# Patient Record
Sex: FEMALE | Race: WHITE | Employment: FULL TIME | URBAN - METROPOLITAN AREA
[De-identification: names, ages, dates, MRNs, and addresses within clinical notes are randomized per-mention and may not be internally consistent; named-entity substitution may affect disease eponyms.]

---

## 2017-01-03 ENCOUNTER — OFFICE VISIT (OUTPATIENT)
Dept: FAMILY MEDICINE | Facility: CLINIC | Age: 27
End: 2017-01-03
Payer: COMMERCIAL

## 2017-01-03 VITALS
SYSTOLIC BLOOD PRESSURE: 119 MMHG | TEMPERATURE: 97.7 F | BODY MASS INDEX: 25.33 KG/M2 | WEIGHT: 152 LBS | HEIGHT: 65 IN | HEART RATE: 77 BPM | DIASTOLIC BLOOD PRESSURE: 68 MMHG

## 2017-01-03 DIAGNOSIS — Z77.120 MOLD SUSPECTED EXPOSURE: ICD-10-CM

## 2017-01-03 DIAGNOSIS — J30.9 ATOPIC RHINITIS: Primary | ICD-10-CM

## 2017-01-03 PROCEDURE — 99213 OFFICE O/P EST LOW 20 MIN: CPT | Performed by: FAMILY MEDICINE

## 2017-01-03 RX ORDER — FLUTICASONE PROPIONATE 50 MCG
1-2 SPRAY, SUSPENSION (ML) NASAL DAILY
Qty: 16 G | Refills: 1 | Status: SHIPPED | OUTPATIENT
Start: 2017-01-03 | End: 2018-03-22

## 2017-01-03 NOTE — NURSING NOTE
"Chief Complaint   Patient presents with     Cough     Pt has had a dry cough and some sinus drainage for the past 3 wks.        Initial /68 mmHg  Pulse 77  Temp(Src) 97.7  F (36.5  C) (Tympanic)  Ht 5' 5.25\" (1.657 m)  Wt 152 lb (68.947 kg)  BMI 25.11 kg/m2 Estimated body mass index is 25.11 kg/(m^2) as calculated from the following:    Height as of this encounter: 5' 5.25\" (1.657 m).    Weight as of this encounter: 152 lb (68.947 kg).  BP completed using cuff size: emelia Simon CMA    "

## 2017-01-03 NOTE — MR AVS SNAPSHOT
After Visit Summary   1/3/2017    Indigo Courtney    MRN: [id number]           Patient Information     Date Of Birth          1990        Visit Information        Provider Department      1/3/2017 9:40 AM Jaren Gtz MD [facility]        Today's Diagnoses     Atopic rhinitis    -  1     Mold suspected exposure           Care Instructions      Allergic Rhinitis  Allergic rhinitis is an allergic reaction that affects the nose, and often the eyes. It s often known as nasal allergies. Nasal allergies are often due to things in the environment that are breathed in. Depending what you are sensitive to, nasal allergies may occur only during certain seasons. Or they may occur year round. Common indoor allergens include house dust mites, mold, cockroaches, and pet dander. Outdoor allergens include pollen from trees, grasses, and weeds.   Symptoms include a drippy, stuffy, and itchy nose. They also include sneezing and red and itchy eyes. You may feel tired more often. Severe allergies may also affect your breathing and trigger a condition called asthma.   Tests can be done to see what allergens are affecting you. You may be referred to an allergy specialist for testing and further evaluation.  Home care  The healthcare provider may prescribe medicines to help relieve allergy symptoms.   Ask the provider for advice on how to avoid substances that you are allergic to. Below are a few tips for each type of allergen.  Pet dander:    Do not have pets with fur and feathers.    If you cannot avoid having a pet, keep it out of your bedroom and off upholstered furniture.  Pollen:    When pollen counts are high, keep windows of your car and home closed. If possible, use an air conditioner instead.    Wear a filter mask when mowing or doing yard work.  House dust mites:    Wash bedding every week in warm water and detergent and dry on a hot setting.    Cover the mattress, box spring, and  pillows with allergy covers.     If possible, sleep in a room with no carpet, curtains, or upholstered furniture.  Cockroaches:    Store food in sealed containers.    Remove garbage from the home promptly.    Fix water leaks  Mold:    Keep humidity low by using a dehumidifier or air conditioner. Keep the dehumidifier and air conditioner clean and free of mold.    Clean moldy areas with bleach and water.  In general:    Vacuum once or twice a week. If possible, use a vacuum with a high-efficiency particulate air (HEPA) filter.    Do not smoke. Avoid cigarette smoke. Cigarette smoke is an irritant that can make symptoms worse.  Follow-up care  Follow up as advised by the health care provider or our staff. If you were referred to an allergy specialist, make this appointment promptly.  When to seek medical advice  Call your healthcare provider right away if the following occur:    Coughing or wheezing    Fever greater than 100.4 F (38 C)    Continuing symptoms, new symptoms, or worsening symptoms  Call 911 right away if you have:    Trouble breathing    Hives (raised red bumps)    Severe swelling of the face or severe itching of the eyes or mouth    7947-0560 [employer]. [address] [place] [address]. All rights reserved. This information is not intended as a substitute for professional medical care. Always follow your healthcare professional's instructions.              Follow-ups after your visit        Who to contact     If you have questions or need follow up information about today's clinic visit or your schedule please contact [facility] directly at [phone].  Normal or non-critical lab and imaging results will be communicated to you by MyChart, letter or phone within 4 business days after the clinic has received the results. If you do not hear from us within 7 days, please contact the clinic through MyChart or phone. If you have a critical or abnormal lab result, we  "will notify you by phone as soon as possible.  Submit refill requests through "[employer] or call your pharmacy and they will forward the refill request to us. Please allow 3 business days for your refill to be completed.          Additional Information About Your Visit        uiuhart Information     "[employer] gives you secure access to your electronic health record. If you see a primary care provider, you can also send messages to your care team and make appointments. If you have questions, please call your primary care clinic.  If you do not have a primary care provider, please call [phone] and they will assist you.        Care EveryWhere ID     This is your Care EveryWhere ID. This could be used by other organizations to access your [facility] medical records  [id number]        Your Vitals Were     Pulse Temperature Height BMI (Body Mass Index)          77 97.7  F (36.5  C) (Tympanic) 5' 5.25\" (1.657 m) 25.11 kg/m2         Blood Pressure from Last 3 Encounters:   01/03/17 119/68   06/30/15 102/61   06/02/14 113/77    Weight from Last 3 Encounters:   01/03/17 152 lb (68.947 kg)   06/30/15 139 lb (63.05 kg)   06/02/14 135 lb 14.4 oz (61.644 kg)              Today, you had the following     No orders found for display         Today's Medication Changes          These changes are accurate as of: 1/3/17 10:24 AM.  If you have any questions, ask your nurse or doctor.               Start taking these medicines.        Dose/Directions    fluticasone 50 MCG/ACT spray   Commonly known as:  FLONASE   Used for:  Atopic rhinitis   Started by:  Jaren Gtz MD        Dose:  1-2 spray   Spray 1-2 sprays into both nostrils daily   Quantity:  16 g   Refills:  1            Where to get your medicines      These medications were sent to [facility] [place], [place] - [address]  [address] [address]     Phone:  [phone]    - fluticasone 50 MCG/ACT spray             Primary Care " Provider Office Phone # Fax #    Nicole Angelina Alvarez [phone]       [facility] [address] [facility] [address]        Thank you!     Thank you for choosing [facility]  for your care. Our goal is always to provide you with excellent care. Hearing back from our patients is one way we can continue to improve our services. Please take a few minutes to complete the written survey that you may receive in the mail after your visit with us. Thank you!             Your Updated Medication List - Protect others around you: Learn how to safely use, store and throw away your medicines at www.disposemymeds.org.          This list is accurate as of: 1/3/17 10:24 AM.  Always use your most recent med list.                   Brand Name Dispense Instructions for use    fluticasone 50 MCG/ACT spray    FLONASE    16 g    Spray 1-2 sprays into both nostrils daily       norgestimate-ethinyl estradiol 0.25-35 MG-MCG per tablet    ORTHO-CYCLEN, SPRINTEC    84 tablet    Take 1 tablet by mouth daily Patient to skip placebo week

## 2017-01-03 NOTE — PATIENT INSTRUCTIONS
Allergic Rhinitis  Allergic rhinitis is an allergic reaction that affects the nose, and often the eyes. It s often known as nasal allergies. Nasal allergies are often due to things in the environment that are breathed in. Depending what you are sensitive to, nasal allergies may occur only during certain seasons. Or they may occur year round. Common indoor allergens include house dust mites, mold, cockroaches, and pet dander. Outdoor allergens include pollen from trees, grasses, and weeds.   Symptoms include a drippy, stuffy, and itchy nose. They also include sneezing and red and itchy eyes. You may feel tired more often. Severe allergies may also affect your breathing and trigger a condition called asthma.   Tests can be done to see what allergens are affecting you. You may be referred to an allergy specialist for testing and further evaluation.  Home care  The healthcare provider may prescribe medicines to help relieve allergy symptoms.   Ask the provider for advice on how to avoid substances that you are allergic to. Below are a few tips for each type of allergen.  Pet dander:    Do not have pets with fur and feathers.    If you cannot avoid having a pet, keep it out of your bedroom and off upholstered furniture.  Pollen:    When pollen counts are high, keep windows of your car and home closed. If possible, use an air conditioner instead.    Wear a filter mask when mowing or doing yard work.  House dust mites:    Wash bedding every week in warm water and detergent and dry on a hot setting.    Cover the mattress, box spring, and pillows with allergy covers.     If possible, sleep in a room with no carpet, curtains, or upholstered furniture.  Cockroaches:    Store food in sealed containers.    Remove garbage from the home promptly.    Fix water leaks  Mold:    Keep humidity low by using a dehumidifier or air conditioner. Keep the dehumidifier and air conditioner clean and free of mold.    Clean moldy areas with  bleach and water.  In general:    Vacuum once or twice a week. If possible, use a vacuum with a high-efficiency particulate air (HEPA) filter.    Do not smoke. Avoid cigarette smoke. Cigarette smoke is an irritant that can make symptoms worse.  Follow-up care  Follow up as advised by the health care provider or our staff. If you were referred to an allergy specialist, make this appointment promptly.  When to seek medical advice  Call your healthcare provider right away if the following occur:    Coughing or wheezing    Fever greater than 100.4 F (38 C)    Continuing symptoms, new symptoms, or worsening symptoms  Call 911 right away if you have:    Trouble breathing    Hives (raised red bumps)    Severe swelling of the face or severe itching of the eyes or mouth    5140-6009 [employer]. [address] [place] [address], [place] [address]. All rights reserved. This information is not intended as a substitute for professional medical care. Always follow your healthcare professional's instructions.

## 2017-01-03 NOTE — PROGRESS NOTES
SUBJECTIVE:                                                    Indigo Courtney is a 26 year old female who presents to clinic today for the following health issues:  Chief Complaint   Patient presents with     Cough     Pt has had a dry cough and some sinus drainage for the past 3 wks.          ENT Symptoms             Symptoms: cc Present Absent Comment   Fever/Chills   x    Fatigue   x    Muscle Aches  x  occasional   Eye Irritation   x    Sneezing   x    Nasal Jacoby/Drg x   Drainage down back of throat    Sinus Pressure/Pain   x    Loss of smell   x    Dental pain   x    Sore Throat   x    Swollen Glands x      Ear Pain/Fullness  x  Both-fullness last 2 days   Cough x   dry   Wheeze   x    Chest Pain   x    Shortness of breath   x    Rash   x    Other         Symptom duration:   3 wks   Symptom severity:  moderate   Treatments tried:  none   Contacts:  none, did find mold in corner of closet yesterday     Verified above history with patient.  Patient has no documented hx of asthma, RAD or chronic lung disease.    Patient states he continues to have the postnasal drip.    Problem list and histories reviewed & adjusted, as indicated.  Additional history: as documented    Patient Active Problem List   Diagnosis     CARDIOVASCULAR SCREENING; LDL GOAL LESS THAN 160     Dysmenorrhea     Recurrent UTI     Family history of nephrolithiasis     Past Surgical History   Procedure Laterality Date     Hc tooth extraction w/forcep       wisdom teeth     Surgical history of -   2008     Bone-patellar-bone left knee anterior cruciate ligament reconstruction       Social History   Substance Use Topics     Smoking status: Never Smoker      Smokeless tobacco: Never Used      Comment: parents smoke     Alcohol Use: Yes      Comment: rare     Family History   Problem Relation Age of Onset     C.A.D. No family hx of      DIABETES No family hx of      Hypertension No family hx of      CEREBROVASCULAR DISEASE No family hx of       "Breast Cancer No family hx of      Cancer - colorectal No family hx of      Allergies Brother          Current Outpatient Prescriptions   Medication Sig Dispense Refill     fluticasone (FLONASE) 50 MCG/ACT spray Spray 1-2 sprays into both nostrils daily 16 g 1     norgestimate-ethinyl estradiol (ORTHO-CYCLEN, SPRINTEC) 0.25-35 MG-MCG per tablet Take 1 tablet by mouth daily Patient to skip placebo week 84 tablet 3     Allergies   Allergen Reactions     Penicillins Rash     Zithromax [Azithromycin Dihydrate] Rash       ROS:  C: NEGATIVE for fever, chills, change in weight  I: NEGATIVE for worrisome rashes, moles or lesions  E: NEGATIVE for vision changes or irritation  ENT/MOUTH: see above  RESP:as above  CV: NEGATIVE for chest pain, palpitations or peripheral edema  GI: NEGATIVE for nausea, abdominal pain, heartburn, or change in bowel habits  M: NEGATIVE for significant arthralgias or myalgia   OBJECTIVE:                                                    /68 mmHg  Pulse 77  Temp(Src) 97.7  F (36.5  C) (Tympanic)  Ht 5' 5.25\" (1.657 m)  Wt 152 lb (68.947 kg)  BMI 25.11 kg/m2  Body mass index is 25.11 kg/(m^2).  GENERAL:  alert and no distress  EYES: pink conjunctivae, no icterus  NECK: mild cervical adenopathy, nontender  HEENT: tympanic membrane intact and pearly bilaterally, nose with mild congestion and sligthly swollen pale turbinates, no sinus tenderness, throat not erythematous, small amount of clear postnasal drip, no oral ulcers  RESP: lungs clear to auscultation - no rales, no rhonchi, no wheezes  CV: regular rates and rhythm, normal S1 S2, no S3 or S4 and no murmur  SKIN:  Good turgor, no rashes    Diagnostic test results:  Diagnostic Test Results:  No results found for this or any previous visit (from the past 24 hour(s)).     ASSESSMENT/PLAN:                                                        ICD-10-CM    1. Atopic rhinitis J30.9 fluticasone (FLONASE) 50 MCG/ACT spray  Discussed course and " treatment of allergic rhinitis.  Advised to avoid known triggers.  Saline nasal irrigation as tolerated.  Return precautions discussed and given to patient.     2. Mold suspected exposure Z77.120 Advised patient to have dwelling fully inspected for mold as visual evidence of mold on one area, may indicate other areas to be infested as well.       Follow up with Provider - prn   Patient Instructions     Allergic Rhinitis  Allergic rhinitis is an allergic reaction that affects the nose, and often the eyes. It s often known as nasal allergies. Nasal allergies are often due to things in the environment that are breathed in. Depending what you are sensitive to, nasal allergies may occur only during certain seasons. Or they may occur year round. Common indoor allergens include house dust mites, mold, cockroaches, and pet dander. Outdoor allergens include pollen from trees, grasses, and weeds.   Symptoms include a drippy, stuffy, and itchy nose. They also include sneezing and red and itchy eyes. You may feel tired more often. Severe allergies may also affect your breathing and trigger a condition called asthma.   Tests can be done to see what allergens are affecting you. You may be referred to an allergy specialist for testing and further evaluation.  Home care  The healthcare provider may prescribe medicines to help relieve allergy symptoms.   Ask the provider for advice on how to avoid substances that you are allergic to. Below are a few tips for each type of allergen.  Pet dander:    Do not have pets with fur and feathers.    If you cannot avoid having a pet, keep it out of your bedroom and off upholstered furniture.  Pollen:    When pollen counts are high, keep windows of your car and home closed. If possible, use an air conditioner instead.    Wear a filter mask when mowing or doing yard work.  House dust mites:    Wash bedding every week in warm water and detergent and dry on a hot setting.    Cover the mattress, box  spring, and pillows with allergy covers.     If possible, sleep in a room with no carpet, curtains, or upholstered furniture.  Cockroaches:    Store food in sealed containers.    Remove garbage from the home promptly.    Fix water leaks  Mold:    Keep humidity low by using a dehumidifier or air conditioner. Keep the dehumidifier and air conditioner clean and free of mold.    Clean moldy areas with bleach and water.  In general:    Vacuum once or twice a week. If possible, use a vacuum with a high-efficiency particulate air (HEPA) filter.    Do not smoke. Avoid cigarette smoke. Cigarette smoke is an irritant that can make symptoms worse.  Follow-up care  Follow up as advised by the health care provider or our staff. If you were referred to an allergy specialist, make this appointment promptly.  When to seek medical advice  Call your healthcare provider right away if the following occur:    Coughing or wheezing    Fever greater than 100.4 F (38 C)    Continuing symptoms, new symptoms, or worsening symptoms  Call 911 right away if you have:    Trouble breathing    Hives (raised red bumps)    Severe swelling of the face or severe itching of the eyes or mouth    4110-1828 [employer]. [address] [place] [address], [place] [address]. All rights reserved. This information is not intended as a substitute for professional medical care. Always follow your healthcare professional's instructions.              Jaren Gtz MD  [facility]

## 2017-07-15 ENCOUNTER — HEALTH MAINTENANCE LETTER (OUTPATIENT)
Age: 27
End: 2017-07-15

## 2017-08-01 ENCOUNTER — TELEPHONE (OUTPATIENT)
Dept: FAMILY MEDICINE | Facility: CLINIC | Age: 27
End: 2017-08-01

## 2017-08-01 NOTE — LETTER
August 1, 2017      Indigo Courtney  [address] [facility] [address]          Dr. Gtz's Care Team has been reviewing your chart and it appears that there are aspects of your care that could be improved. At this time you are due for a pap smear.  If you could plan to do that in the near future it would be very helpful.  We are trying to help our patients achieve their health care goals.    If you have any questions, please feel free to call the clinic at [phone].              Sincerely,        Jaren Gtz MD

## 2017-08-01 NOTE — TELEPHONE ENCOUNTER
Panel Management Review          Composite cancer screening  Chart review shows that this patient is due/due soon for the following Pap Smear  Summary:    Patient is due/failing the following:   PAP    Action needed:   Patient needs office visit for pap smear.    Type of outreach:    Sent letter.    Questions for provider review:    None                                                                                                                                    Devi Simon CMA

## 2017-12-04 DIAGNOSIS — N94.6 DYSMENORRHEA: ICD-10-CM

## 2017-12-04 RX ORDER — NORGESTIMATE AND ETHINYL ESTRADIOL 0.25-0.035
1 KIT ORAL DAILY
Qty: 84 TABLET | Refills: 0 | Status: SHIPPED | OUTPATIENT
Start: 2017-12-04 | End: 2018-03-22

## 2017-12-04 NOTE — TELEPHONE ENCOUNTER
Called pt left message to return call.  Pt is due for gyn exam.  Her last pap was done on 12-27-13.  Terri refill given.    Bianca Pritchard  [facility] RN

## 2017-12-04 NOTE — TELEPHONE ENCOUNTER
Pt notified of below.  Pt reports understanding.  Pt does not have further questions or concerns.  Patient placed on call list for when schedule opens in February.    Antonette Sabillon   [facility]  RN

## 2018-03-22 ENCOUNTER — TELEPHONE (OUTPATIENT)
Dept: OBGYN | Facility: CLINIC | Age: 28
End: 2018-03-22

## 2018-03-22 ENCOUNTER — OFFICE VISIT (OUTPATIENT)
Dept: OBGYN | Facility: CLINIC | Age: 28
End: 2018-03-22
Payer: COMMERCIAL

## 2018-03-22 VITALS
HEART RATE: 68 BPM | DIASTOLIC BLOOD PRESSURE: 64 MMHG | HEIGHT: 65 IN | SYSTOLIC BLOOD PRESSURE: 118 MMHG | RESPIRATION RATE: 12 BRPM | WEIGHT: 155 LBS | BODY MASS INDEX: 25.83 KG/M2

## 2018-03-22 DIAGNOSIS — Z01.419 ENCOUNTER FOR GYNECOLOGICAL EXAMINATION WITHOUT ABNORMAL FINDING: Primary | ICD-10-CM

## 2018-03-22 DIAGNOSIS — N94.6 DYSMENORRHEA: ICD-10-CM

## 2018-03-22 PROCEDURE — G0145 SCR C/V CYTO,THINLAYER,RESCR: HCPCS | Performed by: OBSTETRICS & GYNECOLOGY

## 2018-03-22 PROCEDURE — 99395 PREV VISIT EST AGE 18-39: CPT | Performed by: OBSTETRICS & GYNECOLOGY

## 2018-03-22 RX ORDER — ETONOGESTREL AND ETHINYL ESTRADIOL VAGINAL RING .015; .12 MG/D; MG/D
RING VAGINAL
Qty: 3 EACH | Refills: 0 | Status: SHIPPED | OUTPATIENT
Start: 2018-03-22 | End: 2018-04-02

## 2018-03-22 RX ORDER — NORGESTIMATE AND ETHINYL ESTRADIOL 0.25-0.035
1 KIT ORAL DAILY
Qty: 84 TABLET | Refills: 3 | Status: SHIPPED | OUTPATIENT
Start: 2018-03-22 | End: 2018-04-02

## 2018-03-22 NOTE — NURSING NOTE
"Chief Complaint   Patient presents with     Physical     change birth control       Initial /64 (BP Location: Right arm, Patient Position: Chair, Cuff Size: Adult Regular)  Pulse 68  Resp 12  Ht 5' 5.25\" (1.657 m)  Wt 155 lb (70.3 kg)  LMP 03/20/2018  BMI 25.6 kg/m2 Estimated body mass index is 25.6 kg/(m^2) as calculated from the following:    Height as of this encounter: 5' 5.25\" (1.657 m).    Weight as of this encounter: 155 lb (70.3 kg).  Medication Reconciliation: complete     Amy Smith MA      "

## 2018-03-22 NOTE — MR AVS SNAPSHOT
After Visit Summary   3/22/2018    Indigo Courtney    MRN: [id number]           Patient Information     Date Of Birth          1990        Visit Information        Provider Department      3/22/2018 1:30 PM Evelin Fonseca MD [facility]        Today's Diagnoses     Encounter for gynecological examination without abnormal finding    -  1    Dysmenorrhea          Care Instructions      Preventive Health Recommendations  Female Ages 18 to 25     Yearly exam:     See your health care provider every year in order to  o Review health changes.   o Discuss preventive care.    o Review your medicines if your doctor has prescribed any.      You should be tested each year for STDs (sexually transmitted diseases).       After age 20, talk to your provider about how often you should have cholesterol testing.      Starting at age 21, get a Pap test every three years. If you have an abnormal result, your doctor may have you test more often.      If you are at risk for diabetes, you should have a diabetes test (fasting glucose).     Shots:     Get a flu shot each year.     Get a tetanus shot every 10 years.     Consider getting the shot (vaccine) that prevents cervical cancer (Gardasil).    Nutrition:     Eat at least 5 servings of fruits and vegetables each day.    Eat whole-grain bread, whole-wheat pasta and brown rice instead of white grains and rice.    Talk to your provider about Calcium and Vitamin D.     Lifestyle    Exercise at least 150 minutes a week each week (30 minutes a day, 5 days a week). This will help you control your weight and prevent disease.    Limit alcohol to one drink per day.    No smoking.     Wear sunscreen to prevent skin cancer.    See your dentist every six months for an exam and cleaning.          Follow-ups after your visit        Who to contact     If you have questions or need follow up information about today's clinic visit or your schedule please contact  "[facility] directly at [phone].  Normal or non-critical lab and imaging results will be communicated to you by MyChart, letter or phone within 4 business days after the clinic has received the results. If you do not hear from us within 7 days, please contact the clinic through [employer] or phone. If you have a critical or abnormal lab result, we will notify you by phone as soon as possible.  Submit refill requests through [employer] or call your pharmacy and they will forward the refill request to us. Please allow 3 business days for your refill to be completed.          Additional Information About Your Visit        [employer] Information     [employer] gives you secure access to your electronic health record. If you see a primary care provider, you can also send messages to your care team and make appointments. If you have questions, please call your primary care clinic.  If you do not have a primary care provider, please call [phone] and they will assist you.        Care EveryWhere ID     This is your Care EveryWhere ID. This could be used by other organizations to access your [place] medical records  [id number]        Your Vitals Were     Pulse Respirations Height Last Period BMI (Body Mass Index)       68 12 5' 5.25\" (1.657 m) 03/20/2018 25.6 kg/m2        Blood Pressure from Last 3 Encounters:   03/22/18 118/64   01/03/17 119/68   06/30/15 102/61    Weight from Last 3 Encounters:   03/22/18 155 lb (70.3 kg)   01/03/17 152 lb (68.9 kg)   06/30/15 139 lb (63 kg)              We Performed the Following     Pap imaged thin layer screen reflex to HPV if ASCUS - recommend age 25 - 29          Today's Medication Changes          These changes are accurate as of 3/22/18  3:13 PM.  If you have any questions, ask your nurse or doctor.               Start taking these medicines.        Dose/Directions    etonogestrel-ethinyl estradiol 0.12-0.015 MG/24HR vaginal ring   Commonly known as:  NUVARING   Used " for:  Dysmenorrhea   Started by:  Evelin Fonseca MD        Insert 1 ring vaginally every 21 days   Quantity:  3 each   Refills:  0         Stop taking these medicines if you haven't already. Please contact your care team if you have questions.     fluticasone 50 MCG/ACT spray   Commonly known as:  FLONASE   Stopped by:  Evelin Fonseca MD                Where to get your medicines      These medications were sent to [facility] [address]  [address] [address]     Phone:  [phone]     etonogestrel-ethinyl estradiol 0.12-0.015 MG/24HR vaginal ring    norgestimate-ethinyl estradiol 0.25-35 MG-MCG per tablet                Primary Care Provider Office Phone # Fax #    Nicole Angelina Alvarez [phone] [address] [facility] [address]        Equal Access to Services     DUSTIN [facility] AH: Hadii dianna khan hadasho Soomaali, waaxda luqadaha, qaybta kaalmada adeegyada, waxay carlyin haysarahn tatiana armstrong . So [facility] [phone].    ATENCIÓN: Si marleen espdaniel, tiene a santiago disposición servicios gratuitos de asistencia lingüística. Llame al [phone].    We comply with applicable federal civil rights laws and [place] laws. We do not discriminate on the basis of race, color, national origin, age, disability, sex, sexual orientation, or gender identity.            Thank you!     Thank you for choosing [facility]  for your care. Our goal is always to provide you with excellent care. Hearing back from our patients is one way we can continue to improve our services. Please take a few minutes to complete the written survey that you may receive in the mail after your visit with us. Thank you!             Your Updated Medication List - Protect others around you: Learn how to safely use, store and throw away your medicines at www.disposemymeds.org.          This list is accurate as of 3/22/18  3:13 PM.  Always use your most recent med  list.                   Brand Name Dispense Instructions for use Diagnosis    etonogestrel-ethinyl estradiol 0.12-0.015 MG/24HR vaginal ring    NUVARING    3 each    Insert 1 ring vaginally every 21 days    Dysmenorrhea       norgestimate-ethinyl estradiol 0.25-35 MG-MCG per tablet    ORTHO-CYCLEN, SPRINTEC    84 tablet    Take 1 tablet by mouth daily Patient to skip placebo week    Dysmenorrhea

## 2018-03-22 NOTE — PROGRESS NOTES
SUBJECTIVE:   CC: Indigo Courtney is an 27 year old woman who presents for preventive health visit.     Would like to discuss options for contraception.    Healthy Habits:    Do you get at least three servings of calcium containing foods daily (dairy, green leafy vegetables, etc.)? yes    Amount of exercise or daily activities, outside of work: 4 day(s) per week    Problems taking medications regularly No    Medication side effects: No    Have you had an eye exam in the past two years? yes    Do you see a dentist twice per year? yes    Do you have sleep apnea, excessive snoring or daytime drowsiness?no      PROBLEMS TO ADD ON...    Today's PHQ-2 Score:   PHQ-2 ( 1999 Pfizer) 3/22/2018 1/3/2017   Q1: Little interest or pleasure in doing things 0 0   Q2: Feeling down, depressed or hopeless 0 0   PHQ-2 Score 0 0       Abuse: Current or Past(Physical, Sexual or Emotional)- No  Do you feel safe in your environment - Yes    Social History   Substance Use Topics     Smoking status: Never Smoker     Smokeless tobacco: Never Used      Comment: parents smoke     Alcohol use Yes      Comment: rare     If you drink alcohol do you typically have >3 drinks per day or >7 drinks per week? No                     Reviewed orders with patient.  Reviewed health maintenance and updated orders accordingly - Yes  BP Readings from Last 3 Encounters:   03/22/18 118/64   01/03/17 119/68   06/30/15 102/61    Wt Readings from Last 3 Encounters:   03/22/18 155 lb (70.3 kg)   01/03/17 152 lb (68.9 kg)   06/30/15 139 lb (63 kg)                    Mammogram not appropriate for this patient based on age.    Pertinent mammograms are reviewed under the imaging tab.  History of abnormal Pap smear: NO - age 21-29 PAP every 3 years recommended    Reviewed and updated as needed this visit by clinical staff  Tobacco  Allergies  Meds         Reviewed and updated as needed this visit by Provider            ROS:  C: NEGATIVE for fever, chills, change  "in weight  I: NEGATIVE for worrisome rashes, moles or lesions  E: NEGATIVE for vision changes or irritation  ENT: NEGATIVE for ear, mouth and throat problems  R: NEGATIVE for significant cough or SOB  B: NEGATIVE for masses, tenderness or discharge  CV: NEGATIVE for chest pain, palpitations or peripheral edema  GI: NEGATIVE for nausea, abdominal pain, heartburn, or change in bowel habits  : NEGATIVE for unusual urinary or vaginal symptoms. Periods are regular.  M: NEGATIVE for significant arthralgias or myalgia  N: NEGATIVE for weakness, dizziness or paresthesias  P: NEGATIVE for changes in mood or affect    OBJECTIVE:   /64 (BP Location: Right arm, Patient Position: Chair, Cuff Size: Adult Regular)  Pulse 68  Resp 12  Ht 5' 5.25\" (1.657 m)  Wt 155 lb (70.3 kg)  LMP 03/20/2018  BMI 25.6 kg/m2  EXAM:  GENERAL: healthy, alert and no distress  EYES: Eyes grossly normal to inspection  NECK: no adenopathy, no asymmetry, masses, or scars and thyroid normal to palpation  RESP: lungs clear to auscultation - no rales, rhonchi or wheezes  BREAST: normal without masses, tenderness or nipple discharge and no palpable axillary masses or adenopathy  CV: regular rate and rhythm, normal S1 S2, no S3 or S4, no murmur, click or rub, no peripheral edema and peripheral pulses strong  ABDOMEN: soft, nontender, no hepatosplenomegaly, no masses and bowel sounds normal   (female): normal female external genitalia, normal urethral meatus , vaginal mucosa pink, moist, well rugated, vaginal discharge - menstrual and normal cervix, adnexae, and uterus without masses.  MS: no gross musculoskeletal defects noted, no edema  SKIN: no suspicious lesions or rashes  NEURO: Normal strength and tone, mentation intact and speech normal  PSYCH: mentation appears normal, affect normal/bright    ASSESSMENT/PLAN:   1. Encounter for gynecological examination without abnormal finding  Routine health maintenance reviewed  - Pap imaged thin " "layer screen reflex to HPV if ASCUS - recommend age 25 - 29    2. Dysmenorrhea  Discussed pros/cons of all options available.  Patient is leaning toward Mirena.  However, she has her wedding in September, and we discussed consideration for remaining on combined hormonal contraception so she can prevent her menses for her wedding/honeymoon.      Patient would like to try the nuvaring (she works night shifts and has a hard time remembering), but wants the oral contraceptive pills prescription as back up.      - norgestimate-ethinyl estradiol (ORTHO-CYCLEN, SPRINTEC) 0.25-35 MG-MCG per tablet; Take 1 tablet by mouth daily Patient to skip placebo week  Dispense: 84 tablet; Refill: 3  - etonogestrel-ethinyl estradiol (NUVARING) 0.12-0.015 MG/24HR vaginal ring; Insert 1 ring vaginally every 21 days  Dispense: 3 each; Refill: 0    COUNSELING:   Reviewed preventive health counseling, as reflected in patient instructions  Special attention given to:        Regular exercise       Healthy diet/nutrition       Vision screening       Alcohol Use       Contraception       Family planning       Folic Acid Counseling       Osteoporosis Prevention/Bone Health       Safe sex practices/STD prevention         reports that she has never smoked. She has never used smokeless tobacco.    Estimated body mass index is 25.6 kg/(m^2) as calculated from the following:    Height as of this encounter: 5' 5.25\" (1.657 m).    Weight as of this encounter: 155 lb (70.3 kg).   Weight management plan: Discussed healthy diet and exercise guidelines and patient will follow up in 12 months in clinic to re-evaluate.    Counseling Resources:  ATP IV Guidelines  Pooled Cohorts Equation Calculator  Breast Cancer Risk Calculator  FRAX Risk Assessment  ICSI Preventive Guidelines  Dietary Guidelines for Americans, 2010  USDA's MyPlate  ASA Prophylaxis  Lung CA Screening    Evelin Fonseca MD  [facility]  "

## 2018-03-22 NOTE — TELEPHONE ENCOUNTER
Keep oral contraceptive pills prescription as a back up.  She's going to try nuvaring first and it is written for continuously--it's up to the patient to decide if she will or not.  That patient knows she has that option, and I want to make sure she will be able to  earlier if needed.    Evelin Fonseca M.D.

## 2018-03-22 NOTE — TELEPHONE ENCOUNTER
Reason for Call:  Other prescription    Detailed comments: Pt just in for an appt [facility] calling - received rx for both a BCP and the nuvaring.  Wondering if filling one or the other?  Also if filling the Nuvaring wondering if it should be continuous?  Usually states 1 ring every 21 days and then off a week and repeat.  Please advise.    Phone Number Patient can be reached at: [phone]    Best Time: soon    Can we leave a detailed message on this number? YES    Call taken on 3/22/2018 at 2:26 PM by Brenda Palomares

## 2018-03-26 LAB
COPATH REPORT: NORMAL
PAP: NORMAL

## 2018-07-23 ENCOUNTER — OFFICE VISIT (OUTPATIENT)
Dept: FAMILY MEDICINE | Facility: CLINIC | Age: 28
End: 2018-07-23
Payer: COMMERCIAL

## 2018-07-23 VITALS
TEMPERATURE: 97.8 F | HEART RATE: 71 BPM | WEIGHT: 155 LBS | HEIGHT: 65 IN | DIASTOLIC BLOOD PRESSURE: 68 MMHG | OXYGEN SATURATION: 98 % | RESPIRATION RATE: 18 BRPM | SYSTOLIC BLOOD PRESSURE: 112 MMHG | BODY MASS INDEX: 25.83 KG/M2

## 2018-07-23 DIAGNOSIS — R42 DIZZINESS: Primary | ICD-10-CM

## 2018-07-23 LAB
ALBUMIN SERPL-MCNC: 3.3 G/DL (ref 3.4–5)
ALP SERPL-CCNC: 63 U/L (ref 40–150)
ALT SERPL W P-5'-P-CCNC: 19 U/L (ref 0–50)
ANION GAP SERPL CALCULATED.3IONS-SCNC: 6 MMOL/L (ref 3–14)
AST SERPL W P-5'-P-CCNC: 15 U/L (ref 0–45)
BASOPHILS # BLD AUTO: 0 10E9/L (ref 0–0.2)
BASOPHILS NFR BLD AUTO: 0.3 %
BILIRUB SERPL-MCNC: 0.5 MG/DL (ref 0.2–1.3)
BUN SERPL-MCNC: 14 MG/DL (ref 7–30)
CALCIUM SERPL-MCNC: 8.9 MG/DL (ref 8.5–10.1)
CHLORIDE SERPL-SCNC: 107 MMOL/L (ref 94–109)
CO2 SERPL-SCNC: 26 MMOL/L (ref 20–32)
CREAT SERPL-MCNC: 0.82 MG/DL (ref 0.52–1.04)
DIFFERENTIAL METHOD BLD: NORMAL
EOSINOPHIL # BLD AUTO: 0.4 10E9/L (ref 0–0.7)
EOSINOPHIL NFR BLD AUTO: 6.1 %
ERYTHROCYTE [DISTWIDTH] IN BLOOD BY AUTOMATED COUNT: 13.1 % (ref 10–15)
GFR SERPL CREATININE-BSD FRML MDRD: 84 ML/MIN/1.7M2
GLUCOSE SERPL-MCNC: 95 MG/DL (ref 70–99)
HCT VFR BLD AUTO: 40.5 % (ref 35–47)
HGB BLD-MCNC: 13.5 G/DL (ref 11.7–15.7)
LYMPHOCYTES # BLD AUTO: 1.6 10E9/L (ref 0.8–5.3)
LYMPHOCYTES NFR BLD AUTO: 28.1 %
MCH RBC QN AUTO: 30 PG (ref 26.5–33)
MCHC RBC AUTO-ENTMCNC: 33.3 G/DL (ref 31.5–36.5)
MCV RBC AUTO: 90 FL (ref 78–100)
MONOCYTES # BLD AUTO: 0.3 10E9/L (ref 0–1.3)
MONOCYTES NFR BLD AUTO: 5.7 %
NEUTROPHILS # BLD AUTO: 3.4 10E9/L (ref 1.6–8.3)
NEUTROPHILS NFR BLD AUTO: 59.8 %
PLATELET # BLD AUTO: 172 10E9/L (ref 150–450)
POTASSIUM SERPL-SCNC: 4.3 MMOL/L (ref 3.4–5.3)
PROT SERPL-MCNC: 7 G/DL (ref 6.8–8.8)
RBC # BLD AUTO: 4.5 10E12/L (ref 3.8–5.2)
SODIUM SERPL-SCNC: 139 MMOL/L (ref 133–144)
TSH SERPL DL<=0.005 MIU/L-ACNC: 1.83 MU/L (ref 0.4–4)
WBC # BLD AUTO: 5.8 10E9/L (ref 4–11)

## 2018-07-23 PROCEDURE — 85025 COMPLETE CBC W/AUTO DIFF WBC: CPT | Performed by: FAMILY MEDICINE

## 2018-07-23 PROCEDURE — 86618 LYME DISEASE ANTIBODY: CPT | Performed by: FAMILY MEDICINE

## 2018-07-23 PROCEDURE — 84443 ASSAY THYROID STIM HORMONE: CPT | Performed by: FAMILY MEDICINE

## 2018-07-23 PROCEDURE — 80053 COMPREHEN METABOLIC PANEL: CPT | Performed by: FAMILY MEDICINE

## 2018-07-23 PROCEDURE — 36415 COLL VENOUS BLD VENIPUNCTURE: CPT | Performed by: FAMILY MEDICINE

## 2018-07-23 PROCEDURE — 99214 OFFICE O/P EST MOD 30 MIN: CPT | Performed by: FAMILY MEDICINE

## 2018-07-23 ASSESSMENT — PAIN SCALES - GENERAL: PAINLEVEL: NO PAIN (0)

## 2018-07-23 NOTE — PATIENT INSTRUCTIONS
Thank you for choosing [facility].  You may be receiving a survey in the mail from [facility] regarding your visit today.  Please take a few minutes to complete and return the survey to let us know how we are doing.      Our Clinic hours are:  Mondays    7:20 am - 7 pm  Tues - Fri  7:20 am - 5 pm    Clinic Phone: [phone]    The clinic lab opens at 7:30 am Mon - Fri and appointments are required.    [facility]. [phone]  Monday  8 am - 7pm  Tues - Fri 8 am - 5:30 pm

## 2018-07-23 NOTE — MR AVS SNAPSHOT
After Visit Summary   7/23/2018    Indigo Courtney    MRN: [id number]           Patient Information     Date Of Birth          1990        Visit Information        Provider Department      7/23/2018 12:40 PM Maylin Monroy MD [facility]        Today's Diagnoses     Dizziness    -  1      Care Instructions          Thank you for choosing [facility].  You may be receiving a survey in the mail from [facility] regarding your visit today.  Please take a few minutes to complete and return the survey to let us know how we are doing.      Our Clinic hours are:  Mondays    7:20 am - 7 pm  Tues -  Fri  7:20 am - 5 pm    Clinic Phone: [phone]    The clinic lab opens at 7:30 am Mon - Fri and appointments are required.    [facility] [place]  Ph. [phone]  Monday  8 am - 7pm  Tues - Fri 8 am - 5:30 pm                 Follow-ups after your visit        Future tests that were ordered for you today     Open Future Orders        Priority Expected Expires Ordered    MR Brain w/o & w Contrast Routine  7/23/2019 7/23/2018            Who to contact     If you have questions or need follow up information about today's clinic visit or your schedule please contact [facility] directly at [phone].  Normal or non-critical lab and imaging results will be communicated to you by MyChart, letter or phone within 4 business days after the clinic has received the results. If you do not hear from us within 7 days, please contact the clinic through [employer] or phone. If you have a critical or abnormal lab result, we will notify you by phone as soon as possible.  Submit refill requests through [employer] or call your pharmacy and they will forward the refill request to us. Please allow 3 business days for your refill to be completed.          Additional Information About Your Visit        [employer] Information     [employer] gives you secure access to your electronic  "health record. If you see a primary care provider, you can also send messages to your care team and make appointments. If you have questions, please call your primary care clinic.  If you do not have a primary care provider, please call [phone] and they will assist you.        Care EveryWhere ID     This is your Care EveryWhere ID. This could be used by other organizations to access your [place] medical records  [id number]        Your Vitals Were     Pulse Temperature Respirations Height Pulse Oximetry Breastfeeding?    71 97.8  F (36.6  C) (Tympanic) 18 5' 5.25\" (1.657 m) 98% No    BMI (Body Mass Index)                   25.6 kg/m2            Blood Pressure from Last 3 Encounters:   07/23/18 112/68   03/22/18 118/64   01/03/17 119/68    Weight from Last 3 Encounters:   07/23/18 155 lb (70.3 kg)   03/22/18 155 lb (70.3 kg)   01/03/17 152 lb (68.9 kg)              We Performed the Following     CBC with platelets differential     Comprehensive metabolic panel     Lyme Disease Renee with reflex to WB Serum     TSH with free T4 reflex        Primary Care Provider Office Phone # Fax #    Maylin Monroy [phone] [address] [address]        Equal Access to Services     ANDREW CASTANON AH: Hadii aad ku hadasho Soomaali, waaxda luqadaha, qaybta kaalmada adeegyada, waxay idiin haysarahn tatiana moralez. So [facility] [phone].    ATENCIÓN: Si habla español, tiene a santiago disposición servicios gratuitos de asistencia lingüística. Llame al [phone].    We comply with applicable federal civil rights laws and [place] laws. We do not discriminate on the basis of race, color, national origin, age, disability, sex, sexual orientation, or gender identity.            Thank you!     Thank you for choosing [facility]  for your care. Our goal is always to provide you with excellent care. Hearing back from our patients is one way we can continue to improve our " services. Please take a few minutes to complete the written survey that you may receive in the mail after your visit with us. Thank you!             Your Updated Medication List - Protect others around you: Learn how to safely use, store and throw away your medicines at www.disposemymeds.org.          This list is accurate as of 7/23/18  1:23 PM.  Always use your most recent med list.                   Brand Name Dispense Instructions for use Diagnosis    norgestimate-ethinyl estradiol 0.25-35 MG-MCG per tablet    ORTHO-CYCLEN, SPRINTEC    84 tablet    Take 1 tablet by mouth daily Patient to skip placebo week    Dysmenorrhea

## 2018-07-23 NOTE — PROGRESS NOTES
"  SUBJECTIVE:   Indigo Courtney is a 28 year old female who presents to clinic today for the following health issues:      Dizziness  Onset: 5 days    Description:   Do you feel faint:  no   Does it feel like the surroundings (bed, room) are moving: YES  Unsteady/off balance: YES  Have you passed out or fallen: no     Intensity: moderate    Progression of Symptoms:  worsening    Accompanying Signs & Symptoms:  Heart palpitations: no   Nausea, vomiting: no   Weakness in arms or legs: no   Fatigue: no   Vision or speech changes: YES- blurred  Ringing in ears (Tinnitus): no   Hearing Loss: no     History:   Head trauma/concussion hx: no   Previous similar symptoms: no   Recent bleeding history: no     Precipitating factors:   Worse with activity or head movement: YES  Any new medications (BP?): no   Alcohol/drug abuse/withdrawal: no     Alleviating factors:   Does staying in a fixed position give relief:  no     Therapies Tried and outcome: ibuprofen    Felt a popping sensation in her jaw two weeks ago - went to the dentist and it wasn't a dental issue.  Does have TMJ, but hasn't had problems for two years or so. Jaw pain has been gone 3-4 days.      No sinus/cold symptoms.    No ear pain.    Headache intermittently  - at first it was pretty constant, but this is getting better.     Dizziness/vertigo is getting worse.  Feels like things are \"moving\".  Will get a loss of balance.    Feels like things are \"shaking\"  No spinning sensation.      No fever/chills.   No urinary symptoms.     Periods are light/manageable.    Eating/drinking okay.      No tick bites, no rashes.  No joint pain.    No recent travel.     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  Normal energy.  No fever or chills  Eyes: see above, some mild eye pain.  No visual cuts/visual field changes  ENT: No problems with ears, nose or throat.  No difficulty swallowing.  RESP: No coughing, " "wheezing or shortness of breath  CV: No chest pains or palpitations  GI: No nausea, vomiting,  heartburn, abdominal pain, diarrhea, constipation or change in bowel habits  : No urinary frequency or dysuria, bladder or kidney problems  MUSCULOSKELETAL: No significant muscle or joint pains  Neurologic: see above  PSYCHIATRIC: No problems with anxiety, depression or mental health  HEME/IMMUNE/ALLERGY: No history of bleeding or clotting problems or anemia.  No allergies or immune system problems  ENDOCRINE: No history of thyroid disease, diabetes or other endocrine disorders  SKIN: No rashes,worrisome lesions or skin problems    /68  Pulse 71  Temp 97.8  F (36.6  C) (Tympanic)  Resp 18  Ht 5' 5.25\" (1.657 m)  Wt 155 lb (70.3 kg)  SpO2 98%  Breastfeeding? No  BMI 25.6 kg/m2  EXAM: GENERAL APPEARANCE: Alert, no acute distress  EYES: PERRL, EOM normal, conjunctiva and lids normal  HENT: Ears and TMs normal, oral mucosa and posterior oropharynx normal  RESP: lungs clear to auscultation   CV: normal rate, regular rhythm, no murmur or gallop  ABDOMEN: soft, no organomegaly, masses or tenderness  NEURO: Alert, oriented, speech and mentation normal, Cranial nerves 2-12 are normal., Strength normal and symmetrical in upper and lower extremities., Reflexes 2+ and symmetrical at biceps, triceps, brachioradialis, knees and ankles, negative fiordaliza-hallpike  PSYCH: mentation appears normal., anxious    ASSESSMENT/PLAN:      ICD-10-CM    1. Dizziness R42 TSH with free T4 reflex     MR Brain w/o & w Contrast     Lyme Disease Renee with reflex to WB Serum     Comprehensive metabolic panel     CBC with platelets differential     Etiology uncertain for her symptoms.  Could be simply viral, no other history or physical findings to suggest source.  R/o lyme, anemia, hypothyroidism, renal/liver dysfunction.     Continue to push fluids. Consider eye exam.    Imaging to r/o mass/stroke (statistically unlikely) as patient is very " anxious about the symptoms.    Maylin Monroy M.D.          Patient Instructions         Thank you for choosing [facility].  You may be receiving a survey in the mail from [employer] regarding your visit today.  Please take a few minutes to complete and return the survey to let us know how we are doing.      Our Clinic hours are:  Mondays    7:20 am - 7 pm  Tues -  Fri  7:20 am - 5 pm    Clinic Phone: [phone]    The clinic lab opens at 7:30 am Mon - Fri and appointments are required.    [facility]. [phone]  Monday  8 am - 7pm  Tues - Fri 8 am - 5:30 pm

## 2018-07-24 LAB — B BURGDOR IGG+IGM SER QL: 0.05 (ref 0–0.89)

## 2018-07-24 NOTE — PROGRESS NOTES
Thyroid is normal.     Kidney function and liver function normal.  Albumin is slightly low, but this is not causing your symptoms.  Electrolytes and blood sugar are normal.    Maylin Monroy M.D.

## 2018-07-26 ENCOUNTER — HOSPITAL ENCOUNTER (OUTPATIENT)
Dept: MRI IMAGING | Facility: CLINIC | Age: 28
Discharge: HOME | End: 2018-07-26
Attending: FAMILY MEDICINE | Admitting: FAMILY MEDICINE
Payer: COMMERCIAL

## 2018-07-26 DIAGNOSIS — R42 DIZZINESS: ICD-10-CM

## 2018-07-26 PROCEDURE — 70553 MRI BRAIN STEM W/O & W/DYE: CPT

## 2018-07-26 PROCEDURE — 25000128 H RX IP 250 OP 636: Performed by: FAMILY MEDICINE

## 2018-07-26 PROCEDURE — A9585 GADOBUTROL INJECTION: HCPCS | Performed by: FAMILY MEDICINE

## 2018-07-26 RX ORDER — GADOBUTROL 604.72 MG/ML
7 INJECTION INTRAVENOUS ONCE
Status: COMPLETED | OUTPATIENT
Start: 2018-07-26 | End: 2018-07-26

## 2018-07-26 RX ADMIN — GADOBUTROL 7 ML: 604.72 INJECTION INTRAVENOUS at 16:25

## 2018-07-27 ENCOUNTER — MYC MEDICAL ADVICE (OUTPATIENT)
Dept: FAMILY MEDICINE | Facility: CLINIC | Age: 28
End: 2018-07-27

## 2018-07-27 DIAGNOSIS — R42 DIZZINESS: Primary | ICD-10-CM

## 2018-07-27 DIAGNOSIS — R51.9 NONINTRACTABLE EPISODIC HEADACHE, UNSPECIFIED HEADACHE TYPE: ICD-10-CM

## 2018-07-27 NOTE — PROGRESS NOTES
Good news- brain MRI is normal.  Normal structures, no strokes, no sinus inflammation, etc.    Let me know if you're still having significant problems, I think the next reasonable step would be a neurology referral.    Maylin Monroy M.D.

## 2019-03-05 ENCOUNTER — TELEPHONE (OUTPATIENT)
Dept: FAMILY MEDICINE | Facility: CLINIC | Age: 29
End: 2019-03-05

## 2019-03-05 ENCOUNTER — MEDICAL CORRESPONDENCE (OUTPATIENT)
Dept: HEALTH INFORMATION MANAGEMENT | Facility: CLINIC | Age: 29
End: 2019-03-05

## 2019-03-05 ENCOUNTER — OFFICE VISIT (OUTPATIENT)
Dept: FAMILY MEDICINE | Facility: CLINIC | Age: 29
End: 2019-03-05
Payer: COMMERCIAL

## 2019-03-05 VITALS
RESPIRATION RATE: 16 BRPM | OXYGEN SATURATION: 98 % | SYSTOLIC BLOOD PRESSURE: 112 MMHG | BODY MASS INDEX: 26.23 KG/M2 | TEMPERATURE: 98.7 F | WEIGHT: 163.2 LBS | DIASTOLIC BLOOD PRESSURE: 72 MMHG | HEART RATE: 92 BPM | HEIGHT: 66 IN

## 2019-03-05 DIAGNOSIS — R42 VERTIGO: Primary | ICD-10-CM

## 2019-03-05 PROCEDURE — 99213 OFFICE O/P EST LOW 20 MIN: CPT | Performed by: FAMILY MEDICINE

## 2019-03-05 ASSESSMENT — MIFFLIN-ST. JEOR: SCORE: 1493.05

## 2019-03-05 NOTE — PATIENT INSTRUCTIONS
What happens during the home Epley maneuver?  You may find it helpful to watch a video of the home Epley maneuver first. Or read a brochure with pictures.  Your healthcare provider will tell how often to do this procedure. He or she may ask you to do it 3 times a day until your symptoms have been gone for 24 hours. Your healthcare provider will also tell if your right or left ear is causing your symptoms.  Follow these steps if the problem is with your right ear:  Start by sitting on a bed.   Turn your head 45 degrees to the right.   Quickly lie back, keeping your head turned. Your shoulders should now be on the pillow, and your head should be reclined. Wait 30 seconds.   Turn your head 90 degrees to the left, without raising it. Your head will now be looking 45 degrees to the left. Wait another 30 seconds.   Turn your head and body another 90 degrees to the left, into the bed. Wait another 30 seconds.   Sit up on the left side.  Follow these steps if the problem is with your left ear:  Start by sitting on a bed.   Turn your head 45 degrees to the left.   Quickly lie back, keeping your head turned. Your shoulders should now be on the pillow, and your head should be reclined. Wait 30 seconds.   Turn your head 90 degrees to the right, without raising it. Your head will now be looking 45 degrees to the right. Wait another 30 seconds.   Turn your head and body another 90 degrees to the right, into the bed. Wait another 30 seconds.   Sit up on the right side.  What happens after the home Epley maneuver?  Most people say their symptoms go away right after they do the maneuver. In some cases, it may take a few times for the procedure to work. Some people may have mild symptoms for a couple of weeks. Once your symptoms go away, there is no need to keep doing the maneuver.  Your healthcare provider may suggest avoiding certain positions for a while after your symptoms have gone away. For instance, you may need to sleep  propped up on 2 pillows, to keep your neck from extending straight.  If you still have symptoms after doing the home Epley maneuver, call your healthcare provider. You may not be doing the maneuver the right way. Or you may have another problem that s causing your symptoms of vertigo. The home Epley maneuver only works to treat vertigo from BPPV. But many other conditions can cause vertigo.  You should be able to be active after doing the home Epley maneuver. Make sure your vertigo has really gone away before doing anything dangerous, such as driving.  With the help of the home Epley maneuver, your vertigo may go away for weeks or even years. BPPV often comes back, though. This might happen if another calcium crystal ends up in your semicircular canals. If your vertigo comes back, do home Epley maneuver again to see if your symptoms go away. If the maneuver doesn t work, call your healthcare provider.        Try the medication meclizine or dramamine (but can cause drowsiness).    Next step if not resolving is to see Neurology.  This may be vestibular neuritis (viral reasons for vertigo).

## 2019-03-05 NOTE — PROGRESS NOTES
SUBJECTIVE:   Indigo Lamb is a 28 year old female who presents to clinic today for the following health issues:    Dizziness/ Vertigo  Onset: x 6 months was the first episode (MRI and blood work were normal) and the 2nd episode just happened a week ago.  Worse this time. The first time it resolved itself. Patient was going to see a Neurologist last time but the dizziness stopped.    Description:   Do you feel faint:  YES- occasionally   Does it feel like the surroundings (bed, room) are moving: YES  Unsteady/off balance: YES- sometimes  Have you passed out or fallen: no     Intensity: moderate    Progression of Symptoms:  worsening    Accompanying Signs & Symptoms:  Heart palpitations: no   Nausea, vomiting: YES- nausea  Weakness in arms or legs: no   Fatigue: no   Vision or speech changes: YES- sometimes blurry  Ringing in ears (Tinnitus): no   Hearing Loss: no     History:   Head trauma/concussion hx: no   Previous similar symptoms: YES- happened about 6 months ago fot the first time.  Recent bleeding history: no     Precipitating factors:   Worse with activity or head movement: no   Any new medications (BP?): no   Alcohol/drug abuse/withdrawal: no     Alleviating factors:   Does staying in a fixed position give relief:  no     Therapies Tried and outcome: nothing     Worse with elevator yesterday.  Does have rare migraine headaches.  No recent colds/illnesses.      Problem list and histories reviewed & adjusted, as indicated.  Additional history: as documented    BP Readings from Last 3 Encounters:   03/05/19 112/72   07/23/18 112/68   03/22/18 118/64    Wt Readings from Last 3 Encounters:   03/05/19 74 kg (163 lb 3.2 oz)   07/23/18 70.3 kg (155 lb)   03/22/18 70.3 kg (155 lb)                    Reviewed and updated as needed this visit by clinical staff  Tobacco  Allergies  Meds  Med Hx  Surg Hx  Fam Hx  Soc Hx      Reviewed and updated as needed this visit by Provider      "    ROS:  Constitutional, HEENT, cardiovascular, pulmonary, gi and gu systems are negative, except as otherwise noted.    OBJECTIVE:     /72   Pulse 92   Temp 98.7  F (37.1  C) (Tympanic)   Resp 16   Ht 1.67 m (5' 5.75\")   Wt 74 kg (163 lb 3.2 oz)   LMP 02/26/2019 (Exact Date)   SpO2 98%   BMI 26.54 kg/m    Body mass index is 26.54 kg/m .  GENERAL APPEARANCE: healthy, alert and no distress  EYES: Eyes grossly normal to inspection, PERRL, conjunctivae and sclerae normal and nystagmus had few beats to the left one the first movement but not reproducible.  HENT: ear canals and TM's normal and nose and mouth without ulcers or lesions  NECK: no adenopathy, no asymmetry, masses, or scars and thyroid normal to palpation  NEURO: Normal strength and tone, mentation intact, speech normal, DTR symmetrically normal in lower extremities, gait normal including heel/toe/tandem walking, cranial nerves 2-12 intact and nystagmus as above.    Diagnostic Test Results:  none     ASSESSMENT/PLAN:       Indigo was seen today for dizziness.    Diagnoses and all orders for this visit:    Vertigo: likely BPPV, did have nystagmus at first with few beats to left.  Attempted eply without any change.  Plan referral to [facility] in [place], referral faxed.  Try meclizine 1/2 pill  Take time off work if still feeling off.        Patient Instructions   What happens during the home Epley maneuver?  You may find it helpful to watch a video of the home Epley maneuver first. Or read a brochure with pictures.  Your healthcare provider will tell how often to do this procedure. He or she may ask you to do it 3 times a day until your symptoms have been gone for 24 hours. Your healthcare provider will also tell if your right or left ear is causing your symptoms.  Follow these steps if the problem is with your right ear:  Start by sitting on a bed.   Turn your head 45 degrees to the right.   Quickly lie back, keeping your " head turned. Your shoulders should now be on the pillow, and your head should be reclined. Wait 30 seconds.   Turn your head 90 degrees to the left, without raising it. Your head will now be looking 45 degrees to the left. Wait another 30 seconds.   Turn your head and body another 90 degrees to the left, into the bed. Wait another 30 seconds.   Sit up on the left side.  Follow these steps if the problem is with your left ear:  Start by sitting on a bed.   Turn your head 45 degrees to the left.   Quickly lie back, keeping your head turned. Your shoulders should now be on the pillow, and your head should be reclined. Wait 30 seconds.   Turn your head 90 degrees to the right, without raising it. Your head will now be looking 45 degrees to the right. Wait another 30 seconds.   Turn your head and body another 90 degrees to the right, into the bed. Wait another 30 seconds.   Sit up on the right side.  What happens after the home Epley maneuver?  Most people say their symptoms go away right after they do the maneuver. In some cases, it may take a few times for the procedure to work. Some people may have mild symptoms for a couple of weeks. Once your symptoms go away, there is no need to keep doing the maneuver.  Your healthcare provider may suggest avoiding certain positions for a while after your symptoms have gone away. For instance, you may need to sleep propped up on 2 pillows, to keep your neck from extending straight.  If you still have symptoms after doing the home Epley maneuver, call your healthcare provider. You may not be doing the maneuver the right way. Or you may have another problem that s causing your symptoms of vertigo. The home Epley maneuver only works to treat vertigo from BPPV. But many other conditions can cause vertigo.  You should be able to be active after doing the home Epley maneuver. Make sure your vertigo has really gone away before doing anything dangerous, such as driving.  With the help of  the home Epley maneuver, your vertigo may go away for weeks or even years. BPPV often comes back, though. This might happen if another calcium crystal ends up in your semicircular canals. If your vertigo comes back, do home Epley maneuver again to see if your symptoms go away. If the maneuver doesn t work, call your healthcare provider.        Try the medication meclizine or dramamine (but can cause drowsiness).    Next step if not resolving is to see [facility].  This may be vestibular neuritis (viral reasons for vertigo).        Rocco Weston MD  [facility]

## 2019-08-09 ENCOUNTER — TRANSFERRED RECORDS (OUTPATIENT)
Dept: HEALTH INFORMATION MANAGEMENT | Facility: CLINIC | Age: 29
End: 2019-08-09

## 2019-08-09 ENCOUNTER — MEDICAL CORRESPONDENCE (OUTPATIENT)
Dept: HEALTH INFORMATION MANAGEMENT | Facility: CLINIC | Age: 29
End: 2019-08-09

## 2019-08-16 ENCOUNTER — TELEPHONE (OUTPATIENT)
Dept: OTOLARYNGOLOGY | Facility: CLINIC | Age: 29
End: 2019-08-16

## 2019-08-20 NOTE — TELEPHONE ENCOUNTER
1. Have you noticed any changes in hearing? No  2. Do you have ringing, buzzing, or other sounds in your ears or head, this is also referred to as Tinnitus? No  3. When and where was your last hearing test? no  4. Do you feel lightheaded or foggy? Yes  5. Do you have a spinning sensation? Yes  6. Is there any specific position that can bring on dizziness? Random  7. Does looking up cause dizziness? No  8. Does getting in and our of bed cause dizziness? No  9. Does turning over in bed increase or cause dizziness? No  10. Does bending over cause dizziness? No  11. Is there anything that you can do to prevent the dizziness? meds  12. Has the dizziness gotten better with time? No  13. Have you seen Physical Therapy for dizziness? (Please indicate clinic and as much of the location as possible): No, If yes, where?  if yes, who?   14. Are you being referred to a specific physician? No  15. Have you been evaluated/treated for your dizziness at any other location?  (If yes,obtian as much clinic/provider/locaiton as possible) Yes. (If yes answer the following questions:)   Have you seen any ENT, Neurology, or other providers for these symptoms?             Yes, If yes, where? [facility] if yes, who?    Have you had any balance or Audiology testing? No Have you had an MRI or CT scan of your head or neck? Yes, If yes, where? [facility]  if yes, who?     Would you like to receive your Release of Information by mail or e-mail?  e-mail

## 2019-08-28 PROBLEM — H81.90 VESTIBULAR DYSFUNCTION, UNSPECIFIED LATERALITY: Status: ACTIVE | Noted: 2019-08-28

## 2019-09-09 NOTE — TELEPHONE ENCOUNTER
FUTURE VISIT INFORMATION      FUTURE VISIT INFORMATION:    Date: 10/15/19    Time: 1PM    Location: [facility]  REFERRAL INFORMATION:    Referring provider:  Dr Nora Stallworth    Referring providers clinic:  [facility]    Reason for visit/diagnosis  Vertigo     RECORDS REQUESTED FROM:       Clinic name Comments Records Status Imaging Status   [facility] 3/5/19, 7/23/18 notes with Dr Trista RUBALCAVA    [facility] 7/26/18 MR Brain  [facility] PACS   [facility]  8/9/19 notes with Dr Stallworth Scanned in EPIC                        9/9/19 6:51PM sending a message to Audiology to review for testing - [facility]

## 2019-09-10 DIAGNOSIS — R42 DIZZINESS: Primary | ICD-10-CM

## 2019-09-10 NOTE — TELEPHONE ENCOUNTER
Requested orders for hearing test (to be done first) and VNG testing.    Patient having episodes of lightheadedness, mild spinning and imbalance, onset in July of 2018. Recurrence of symptoms and worsening since February 2019. Tried Epley maneuver with no relief. Intermittent headaches which do not correlate with dizziness. MRI in 2018 was unremarkable.     Stephen Mcghee.  Licensed Audiologist  [place] # [id number]

## 2019-09-11 ENCOUNTER — TELEPHONE (OUTPATIENT)
Dept: OTOLARYNGOLOGY | Facility: CLINIC | Age: 29
End: 2019-09-11

## 2019-10-14 ENCOUNTER — OFFICE VISIT (OUTPATIENT)
Dept: AUDIOLOGY | Facility: CLINIC | Age: 29
End: 2019-10-14
Payer: COMMERCIAL

## 2019-10-14 DIAGNOSIS — R42 DIZZINESS: ICD-10-CM

## 2019-10-14 DIAGNOSIS — R42 DIZZINESS AND GIDDINESS: Primary | ICD-10-CM

## 2019-10-14 NOTE — PROGRESS NOTES
AUDIOLOGY REPORT-BALANCE ASSESSMENT    SUBJECTIVE: Indigo Lamb, 29 year old, was seen in Audiology at the [facility] on 10/14/2019, for videonystagmography (VNG), referred by Rick Nissen, M.D. They were accompanied today by their mother. The patient reports onset of dizziness in May of 2018. She reports a worsening of her episodes over time. She reports her episodes last for up to a month at a time (last one ended 1 month ago), with constant fluctuating symptoms during the episodes. She denies spinning, but reports feeling like everything is shifting or moving, disorientation and imbalance, as well as eye soreness. She has not noticed any specific things that trigger her episodes, but notes that she found mold in her house about the time when her episodes started, and she wonders if this could be related. She started having post-nasal drainage around the same time as well. She experiences occasional headaches, but denies migraines. She does not have a history of falls, head trauma, allergies, family history of dizziness, or other major medical conditions.  Hearing evaluations have revealed normal hearing bilaterally.She denies other ear-related symptoms, fluctuating hearing, or history of ear surgeries. Indigo  has not taken any antivestibular medications in the past 48 hours.    OBJECTIVE:  Abuse Screening:  Do you feel unsafe at home or work/school? No  Do you feel threatened by someone? No  Does anyone try to keep you from having contact with others, or doing things outside of your home? No  Physical signs of abuse present? No    Dizziness Handicap Inventory (DHI): 18/100 : mild perceived impairment    Videonystagmography (VNG) testing:  Prescreening:  Tympanograms: Tested during hearing test appointment prior to VNG showed normal eardrum mobility bilaterally. Note: this test is completed to determine the status of the middle ear before irrigations are  completed.  Ocular range of motion and ocular counter roll: Normal  Cross/cover:Normal  Head Thrust: Negative     Nystagmus Tests:  Gaze-Horizontal with fixation:   Center: Normal   Right: Normal   Left: Normal  Gaze-Vertical with fixation:   Up: Normal   Down: Normal  Gaze with fixation denied   Center: Normal   Right: Normal   Left: Normal   Up: Normal  High Frequency Headshake:   Horizontal: Negative. No nystagmus. Patient reported swaying feeling post-headshake   Vertical: Negative. No nystagmus. Patient reported swaying feeling post-headshake    Neva-Hallpike Head Right: Negative for nystagmus & dizziness. Patient reported a rush to head upon sitting.   Marston-Hallpike Head Left: Negative for nystagmus & dizziness. Patient reported a rush to head upon sitting.  Roll Test Head Right: Negative for nystagmus & dizziness.  Roll Test Head Left: Negative for nystagmus & dizziness. Patient reported blurred vision.    Positional Testing:  Positionals: Supine: Normal  Positionals: Body Right: Normal  Positionals: Body Left: Normal  Positionals: Pre-Caloric: Normal    Oculomotor Tests:  Saccades: Normal  Anti-saccades: Patient is able to perform task  Pursuit: Normal    Calorics :  (Tested at 44 degrees and 30 degrees Celsius for 30 seconds for warm and cool water, respectively):  Right Warm Eye Speed: 15 degrees per second right beating  Left Warm Eye Speed: 44 degrees per second left beating  Repeat Left Warm Eye Speed: 29 degrees per second left beating  Right Cool Eye Speed: 16 degrees per second left beating  Left Cool Eye Speed: 15 degrees per second right beating  Difference between ear: 17% right hypofunction. (Greater than 25% considered clinically significant.)  Fixation Index: 0.06 Normal  Overall caloric test: Normal    ASSESSMENT:  1. There were no indications of central vestibular system involvement noted on today's exam.     2. There were no indications of peripheral vestibular system involvement noted on  today's exam.     PLAN:  Follow-up with Dr. Nissen for medical management.  Please call this clinic at [phone] with questions regarding these results or recommendations.       Stephen Mcghee.  Licensed Audiologist  [place] # [id number]

## 2019-10-14 NOTE — PROGRESS NOTES
AUDIOLOGY REPORT    SUMMARY: Audiology visit completed. See audiogram for results.      RECOMMENDATIONS: Follow-up with ENT.      Stephen Park.  Licensed Audiologist  [place] #[id number]

## 2019-10-15 ENCOUNTER — PRE VISIT (OUTPATIENT)
Dept: OTOLARYNGOLOGY | Facility: CLINIC | Age: 29
End: 2019-10-15

## 2019-10-15 ENCOUNTER — OFFICE VISIT (OUTPATIENT)
Dept: OTOLARYNGOLOGY | Facility: CLINIC | Age: 29
End: 2019-10-15
Payer: COMMERCIAL

## 2019-10-15 VITALS
SYSTOLIC BLOOD PRESSURE: 115 MMHG | DIASTOLIC BLOOD PRESSURE: 77 MMHG | WEIGHT: 173 LBS | RESPIRATION RATE: 17 BRPM | HEIGHT: 66 IN | BODY MASS INDEX: 27.8 KG/M2 | HEART RATE: 73 BPM

## 2019-10-15 DIAGNOSIS — R09.82 POST-NASAL DRAINAGE: Primary | ICD-10-CM

## 2019-10-15 DIAGNOSIS — H61.23 EXCESSIVE CERUMEN IN BOTH EAR CANALS: ICD-10-CM

## 2019-10-15 DIAGNOSIS — R42 DIZZINESS: ICD-10-CM

## 2019-10-15 DIAGNOSIS — G43.809 VESTIBULAR MIGRAINE: ICD-10-CM

## 2019-10-15 RX ORDER — FLUTICASONE PROPIONATE 50 MCG
2 SPRAY, SUSPENSION (ML) NASAL DAILY
Qty: 16 G | Refills: 6 | Status: SHIPPED | OUTPATIENT
Start: 2019-10-15 | End: 2019-10-16

## 2019-10-15 ASSESSMENT — MIFFLIN-ST. JEOR: SCORE: 1532.47

## 2019-10-15 ASSESSMENT — PAIN SCALES - GENERAL: PAINLEVEL: NO PAIN (0)

## 2019-10-15 NOTE — LETTER
10/15/2019       RE: Indigo Lamb  [address] [facility] [facility] [address]     Dear Colleague,    Thank you for referring your patient, Idnigo Lamb, to the [facility] at [facility]. Please see a copy of my visit note below.    Dear Maylin Colon:    I had the pleasure of meeting Indigo Lamb in consultation today at the [facility] at your request.    CHIEF COMPLAINT: Dizziness    HISTORY OF PRESENT ILLNESS: Patient is a 29-year-old in today for assessment of dizziness.  She began having issues in May 2018.  She describes mainly a lightheadedness or a off-balance sensation.  Occasionally may have some spinning.  She gets maybe 5-6 episodes a year, does not have any associated nausea or vomiting.  She does have migraines, not necessarily associated with the off-balance.  When the dizziness is present, it can be present for several weeks to even a whole month.  She is able to work during this time, just needs to be careful and its a nuisance.  From an ear standpoint, she does not notice any hearing fluctuation or change.  She denies any tinnitus.  There is no pain or drainage in her ears, no pressure feeling or sensation.  She denies any dysphagia, hoarseness, facial paresthesias.  She has had an MRI scan which is negative, all her laboratory has been negative as well.  She did have a trial of a Medrol Dosepak with no benefit.  She has 1 brother, no ear problems with him.  She does complain of postnasal drainage a lot, no sinus pressure or pain.  Able to breathe through both nostrils well.    ALLERGIES:    Allergies   Allergen Reactions     Penicillins Rash     Zithromax [Azithromycin Dihydrate] Rash       HABITS: Social History    Substance and Sexual Activity      Alcohol use: Yes        Comment: rare     History   Smoking Status     Never Smoker   Smokeless Tobacco     Never Used     Comment: parents  smoke         PAST MEDICAL HISTORY: Please see today's intake form (for the remainder of the [facility]) which I reviewed and signed.  History reviewed. No pertinent past medical history.    FAMILY HISTORY/SOCIAL HISTORY:   Family History   Problem Relation Age of Onset     Hypertension Maternal Grandmother      Allergies Brother      C.A.D. No family hx of      Diabetes No family hx of      Hypertension No family hx of      Cerebrovascular Disease No family hx of      Breast Cancer No family hx of      Cancer - colorectal No family hx of       Social History     Socioeconomic History     Marital status: Single     Spouse name: Not on file     Number of children: Not on file     Years of education: Not on file     Highest education level: Not on file   Occupational History     Not on file   Social Needs     Financial resource strain: Not on file     Food insecurity:     Worry: Not on file     Inability: Not on file     Transportation needs:     Medical: Not on file     Non-medical: Not on file   Tobacco Use     Smoking status: Never Smoker     Smokeless tobacco: Never Used     Tobacco comment: parents smoke   Substance and Sexual Activity     Alcohol use: Yes     Comment: rare     Drug use: No     Sexual activity: Yes     Partners: Male     Birth control/protection: Pill, Condom     Comment: boyfriend since 2010   Lifestyle     Physical activity:     Days per week: Not on file     Minutes per session: Not on file     Stress: Not on file   Relationships     Social connections:     Talks on phone: Not on file     Gets together: Not on file     Attends L[employer] service: Not on file     Active member of club or organization: Not on file     Attends meetings of clubs or organizations: Not on file     Relationship status: Not on file     Intimate partner violence:     Fear of current or ex partner: Not on file     Emotionally abused: Not on file     Physically abused: Not on file     Forced sexual activity: Not on file   Other  Topics Concern     Parent/sibling w/ CABG, MI or angioplasty before 65F 55M? No   Social History Narrative     Not on file       REVIEW OF SYSTEMS: Patient Supplied Answers to Review of Systems   ENT ROS 10/15/2019   Neurology Dizzy spells   Ears, Nose, Throat Nasal congestion or drainage       The remainder of the 10 point ROS is negative    PHYSICIAL EXAMINATION:  Constitutional: The patient was well-groomed and in no acute distress.   Skin: Warm and pink.  Psychiatric: The patient's affect was calm, cooperative, and appropriate.   Respiratory: Breathing comfortably without stridor or exertion of accessory muscles.  Eyes: Pupils were equal and reactive. Extraocular movement intact.   Head: Normocephalic and atraumatic. No lesions or scars.  Ears: Patient placed under the microscope for microscopic evaluation and cleaning of cerumen which was obscuring full visualization of both TMs. Under high power magnification, the right ear was examined and cleaned of cerumen using curet, alligator forceps, and suction.  After cleaning, TM is fully visualized and has normal position with normal middle ear aeration. The left ear was then cleaned and inspected using microscope, instruments and similar techniques. After cleaning of cerumen, the TM has normal position with normal aeration to middle ear.  Nose: Sinuses were nontender. Anterior rhinoscopy revealed midline septum and absence of purulence or polyps.  Oral Cavity: Normal tongue, floor of mouth, buccal mucosa, and palate. No lesions or masses on inspection or palpation. No abnormal lymph tissue in the oropharynx.   Neck: The parotid is soft without masses. Supple with normal laryngeal and tracheal landmarks.   Lymphatic: There is no palpable lymphadenopathy or other masses in the neck.   Neurologic: Alert and oriented x 3. Cranial nerves III-XI within normal limits. Voice quality normal.  Cerebellar Function Tests:  Grossly normal    Audiogram: Audiogram performed  shows completely normal hearing in both ears through all frequencies.  She has 100% discrimination levels bilaterally.  Normal type A tympanograms bilaterally.      IMPRESSION AND PLAN:   1. Dizziness: Discussed her dizziness with her in detail.  I do not think this looks to be otologic in nature despite the description of symptoms and lack of any associated auditory symptoms.  Again all her balance testing from a peripheral or in her ear standpoint normal today as well.  Wonder about possible vestibular migraines recommend she see a neurologist for their assessment.  2. Postnasal drainage: Suspect possible allergies, trial of Flonase recommended and prescription given for this, will trial that for 1-2 bottles and then as needed.  3. Excessive cerumen: Cleaned today, no further treatment needed, monitor.  4. Vestibular migraines: Suspect cause for dizziness, to see neurologist for their assessment.    Thank you very much for the opportunity to participate in the care of your patient.    Rick L Nissen MD

## 2019-10-15 NOTE — PATIENT INSTRUCTIONS
1.  You were seen in the ENT Clinic today by Dr. Nissen.  If you have any questions or concerns after your appointment, please call [phone]. Press option #1 for scheduling related needs. Press option #3 for Nurse advice.    2.  Plan is to return to clinic as needed.      Erna Monroy LPN  [facility]  [phone]

## 2019-10-15 NOTE — NURSING NOTE
"Chief Complaint   Patient presents with     Consult     vertigo      Blood pressure 115/77, pulse 73, resp. rate 17, height 1.67 m (5' 5.75\"), weight 78.5 kg (173 lb), not currently breastfeeding.    Deny Beaver LPN    "

## 2019-10-15 NOTE — PROGRESS NOTES
Dear Maylin Colon:    I had the pleasure of meeting Indigo Lamb in consultation today at the [facility] at your request.    CHIEF COMPLAINT: Dizziness    HISTORY OF PRESENT ILLNESS: Patient is a 29-year-old in today for assessment of dizziness.  She began having issues in May 2018.  She describes mainly a lightheadedness or a off-balance sensation.  Occasionally may have some spinning.  She gets maybe 5-6 episodes a year, does not have any associated nausea or vomiting.  She does have migraines, not necessarily associated with the off-balance.  When the dizziness is present, it can be present for several weeks to even a whole month.  She is able to work during this time, just needs to be careful and its a nuisance.  From an ear standpoint, she does not notice any hearing fluctuation or change.  She denies any tinnitus.  There is no pain or drainage in her ears, no pressure feeling or sensation.  She denies any dysphagia, hoarseness, facial paresthesias.  She has had an MRI scan which is negative, all her laboratory has been negative as well.  She did have a trial of a Medrol Dosepak with no benefit.  She has 1 brother, no ear problems with him.  She does complain of postnasal drainage a lot, no sinus pressure or pain.  Able to breathe through both nostrils well.    ALLERGIES:    Allergies   Allergen Reactions     Penicillins Rash     Zithromax [Azithromycin Dihydrate] Rash       HABITS: Social History    Substance and Sexual Activity      Alcohol use: Yes        Comment: rare     History   Smoking Status     Never Smoker   Smokeless Tobacco     Never Used     Comment: parents smoke         PAST MEDICAL HISTORY: Please see today's intake form (for the remainder of the PMH) which I reviewed and signed.  History reviewed. No pertinent past medical history.    FAMILY HISTORY/SOCIAL HISTORY:   Family History   Problem Relation Age of Onset     Hypertension Maternal  Grandmother      Allergies Brother      C.A.D. No family hx of      Diabetes No family hx of      Hypertension No family hx of      Cerebrovascular Disease No family hx of      Breast Cancer No family hx of      Cancer - colorectal No family hx of       Social History     Socioeconomic History     Marital status: Single     Spouse name: Not on file     Number of children: Not on file     Years of education: Not on file     Highest education level: Not on file   Occupational History     Not on file   Social Needs     Financial resource strain: Not on file     Food insecurity:     Worry: Not on file     Inability: Not on file     Transportation needs:     Medical: Not on file     Non-medical: Not on file   Tobacco Use     Smoking status: Never Smoker     Smokeless tobacco: Never Used     Tobacco comment: parents smoke   Substance and Sexual Activity     Alcohol use: Yes     Comment: rare     Drug use: No     Sexual activity: Yes     Partners: Male     Birth control/protection: Pill, Condom     Comment: boyfriend since 2010   Lifestyle     Physical activity:     Days per week: Not on file     Minutes per session: Not on file     Stress: Not on file   Relationships     Social connections:     Talks on phone: Not on file     Gets together: Not on file     Attends [employer] service: Not on file     Active member of club or organization: Not on file     Attends meetings of clubs or organizations: Not on file     Relationship status: Not on file     Intimate partner violence:     Fear of current or ex partner: Not on file     Emotionally abused: Not on file     Physically abused: Not on file     Forced sexual activity: Not on file   Other Topics Concern     Parent/sibling w/ CABG, MI or angioplasty before 65F 55M? No   Social History Narrative     Not on file       REVIEW OF SYSTEMS: Patient Supplied Answers to Review of Systems  [facility] ROS 10/15/2019   Neurology Dizzy spells   Ears, Nose, Throat Nasal congestion or  drainage       The remainder of the 10 point ROS is negative    PHYSICIAL EXAMINATION:  Constitutional: The patient was well-groomed and in no acute distress.   Skin: Warm and pink.  Psychiatric: The patient's affect was calm, cooperative, and appropriate.   Respiratory: Breathing comfortably without stridor or exertion of accessory muscles.  Eyes: Pupils were equal and reactive. Extraocular movement intact.   Head: Normocephalic and atraumatic. No lesions or scars.  Ears: Patient placed under the microscope for microscopic evaluation and cleaning of cerumen which was obscuring full visualization of both TMs. Under high power magnification, the right ear was examined and cleaned of cerumen using curet, alligator forceps, and suction.  After cleaning, TM is fully visualized and has normal position with normal middle ear aeration. The left ear was then cleaned and inspected using microscope, instruments and similar techniques. After cleaning of cerumen, the TM has normal position with normal aeration to middle ear.  Nose: Sinuses were nontender. Anterior rhinoscopy revealed midline septum and absence of purulence or polyps.  Oral Cavity: Normal tongue, floor of mouth, buccal mucosa, and palate. No lesions or masses on inspection or palpation. No abnormal lymph tissue in the oropharynx.   Neck: The parotid is soft without masses. Supple with normal laryngeal and tracheal landmarks.   Lymphatic: There is no palpable lymphadenopathy or other masses in the neck.   Neurologic: Alert and oriented x 3. Cranial nerves III-XI within normal limits. Voice quality normal.  Cerebellar Function Tests:  Grossly normal    Audiogram: Audiogram performed shows completely normal hearing in both ears through all frequencies.  She has 100% discrimination levels bilaterally.  Normal type A tympanograms bilaterally.      IMPRESSION AND PLAN:   1. Dizziness: Discussed her dizziness with her in detail.  I do not think this looks to be  otologic in nature despite the description of symptoms and lack of any associated auditory symptoms.  Again all her balance testing from a peripheral or in her ear standpoint normal today as well.  Wonder about possible vestibular migraines recommend she see a neurologist for their assessment.  2. Postnasal drainage: Suspect possible allergies, trial of Flonase recommended and prescription given for this, will trial that for 1-2 bottles and then as needed.  3. Excessive cerumen: Cleaned today, no further treatment needed, monitor.  4. Vestibular migraines: Suspect cause for dizziness, to see neurologist for their assessment.    Thank you very much for the opportunity to participate in the care of your patient.    Rick L Nissen MD

## 2019-10-16 RX ORDER — FLUTICASONE PROPIONATE 50 MCG
2 SPRAY, SUSPENSION (ML) NASAL DAILY
Qty: 16 G | Refills: 6 | Status: SHIPPED | OUTPATIENT
Start: 2019-10-16 | End: 2020-03-30

## 2019-11-03 ENCOUNTER — HEALTH MAINTENANCE LETTER (OUTPATIENT)
Age: 29
End: 2019-11-03

## 2019-12-12 ENCOUNTER — TRANSFERRED RECORDS (OUTPATIENT)
Dept: HEALTH INFORMATION MANAGEMENT | Facility: CLINIC | Age: 29
End: 2019-12-12

## 2020-03-04 NOTE — PROGRESS NOTES
SUBJECTIVE:   CC: Indigo Lamb is an 29 year old woman who presents for preventive health visit.     Healthy Habits:    Do you get at least three servings of calcium containing foods daily (dairy, green leafy vegetables, etc.)? yes    Amount of exercise or daily activities, outside of work: 4 day(s) per week    Problems taking medications regularly not applicable    Medication side effects: No    Have you had an eye exam in the past two years? no    Do you see a dentist twice per year? yes    Do you have sleep apnea, excessive snoring or daytime drowsiness?no    Concern: Acne. Off the pill and trying for pregnancy.  Doing facial regimens.    Today's PHQ-2 Score:   PHQ-2 ( 1999 Pf[employer]) 10/15/2019 3/5/2019   Q1: Little interest or pleasure in doing things 0 0   Q2: Feeling down, depressed or hopeless 0 0   PHQ-2 Score 0 0       Abuse: Current or Past(Physical, Sexual or Emotional)- No  Do you feel safe in your environment? Yes        Social History     Tobacco Use     Smoking status: Never Smoker     Smokeless tobacco: Never Used     Tobacco comment: parents smoke   Substance Use Topics     Alcohol use: Yes     Comment: rare     If you drink alcohol do you typically have >3 drinks per day or >7 drinks per week? No                     Reviewed orders with patient.  Reviewed health maintenance and updated orders accordingly - Yes  BP Readings from Last 3 Encounters:   03/06/20 102/60   10/15/19 115/77   03/05/19 112/72    Wt Readings from Last 3 Encounters:   03/06/20 79.8 kg (176 lb)   10/15/19 78.5 kg (173 lb)   03/05/19 74 kg (163 lb 3.2 oz)                    Mammogram not appropriate for this patient based on age.    Pertinent mammograms are reviewed under the imaging tab.  History of abnormal Pap smear: NO - age 30- 65 PAP every 3 years recommended  PAP / HPV 3/22/2018 12/27/2013   PAP NIL ASC-US(A)     Reviewed and updated as needed this visit by clinical staff         Reviewed and updated as needed this  "visit by Provider            ROS:  CONSTITUTIONAL: NEGATIVE for fever, chills, change in weight  INTEGUMENTARU/SKIN: NEGATIVE for worrisome rashes, moles or lesions  EYES: NEGATIVE for vision changes or irritation  ENT: NEGATIVE for ear, mouth and throat problems  RESP: NEGATIVE for significant cough or SOB  BREAST: NEGATIVE for masses, tenderness or discharge  CV: NEGATIVE for chest pain, palpitations or peripheral edema  GI: NEGATIVE for nausea, abdominal pain, heartburn, or change in bowel habits  : NEGATIVE for unusual urinary or vaginal symptoms. Periods are regular.  MUSCULOSKELETAL: NEGATIVE for significant arthralgias or myalgia  NEURO: NEGATIVE for weakness, dizziness or paresthesias  PSYCHIATRIC: NEGATIVE for changes in mood or affect    OBJECTIVE:   /60   Pulse 81   Resp 16   Ht 1.664 m (5' 5.5\")   Wt 79.8 kg (176 lb)   LMP 02/21/2020 (Exact Date)   SpO2 98%   BMI 28.84 kg/m    EXAM:  GENERAL: healthy, alert and no distress  EYES: Eyes grossly normal to inspection, PERRL and conjunctivae and sclerae normal  HENT: ear canals and TM's normal, nose and mouth without ulcers or lesions  NECK: no adenopathy, no asymmetry, masses, or scars and thyroid normal to palpation  RESP: lungs clear to auscultation - no rales, rhonchi or wheezes  BREAST: normal without masses, tenderness or nipple discharge and no palpable axillary masses or adenopathy  CV: regular rate and rhythm, normal S1 S2, no S3 or S4, no murmur, click or rub, no peripheral edema and peripheral pulses strong  ABDOMEN: soft, nontender, no hepatosplenomegaly, no masses and bowel sounds normal  MS: no gross musculoskeletal defects noted, no edema  SKIN: no suspicious lesions or rashes  NEURO: Normal strength and tone, mentation intact and speech normal  PSYCH: mentation appears normal, affect normal/bright    Diagnostic Test Results:  Labs reviewed in Epic    ASSESSMENT/PLAN:   Indigo was seen today for physical and derm " "problem.    Diagnoses and all orders for this visit:    Routine general medical examination at a health care facility    Acne vulgaris: discussed what is safe during trying for and during pregnancy  -discussed risks, benefits, and side effects of this new medication. Patient understands and is in agreement.  -     clindamycin (CLINDAMAX) 1 % external gel; Apply topically 2 times daily        COUNSELING:   Reviewed preventive health counseling, as reflected in patient instructions       Regular exercise       Healthy diet/nutrition       Vision screening       Family planning    Estimated body mass index is 28.84 kg/m  as calculated from the following:    Height as of this encounter: 1.664 m (5' 5.5\").    Weight as of this encounter: 79.8 kg (176 lb).    Weight management plan: Discussed healthy diet and exercise guidelines     reports that she has never smoked. She has never used smokeless tobacco.      Counseling Resources:  ATP IV Guidelines  Pooled Cohorts Equation Calculator  Breast Cancer Risk Calculator  FRAX Risk Assessment  ICSI Preventive Guidelines  Dietary Guidelines for Americans, 2010  USDA's MyPlate  ASA Prophylaxis  Lung CA Screening    Rocco Weston MD  [facility]  "

## 2020-03-04 NOTE — PATIENT INSTRUCTIONS
Start prenatal vitamin daily    Preventive Health Recommendations  Female Ages 26 - 39  Yearly exam:   See your health care provider every year in order to    Review health changes.     Discuss preventive care.      Review your medicines if you your doctor has prescribed any.    Until age 30: Get a Pap test every three years (more often if you have had an abnormal result).    After age 30: Talk to your doctor about whether you should have a Pap test every 3 years or have a Pap test with HPV screening every 5 years.   You do not need a Pap test if your uterus was removed (hysterectomy) and you have not had cancer.  You should be tested each year for STDs (sexually transmitted diseases), if you're at risk.   Talk to your provider about how often to have your cholesterol checked.  If you are at risk for diabetes, you should have a diabetes test (fasting glucose).  Shots: Get a flu shot each year. Get a tetanus shot every 10 years.   Nutrition:     Eat at least 5 servings of fruits and vegetables each day.    Eat whole-grain bread, whole-wheat pasta and brown rice instead of white grains and rice.    Get adequate Calcium and Vitamin D.     Lifestyle    Exercise at least 150 minutes a week (30 minutes a day, 5 days of the week). This will help you control your weight and prevent disease.    Limit alcohol to one drink per day.    No smoking.     Wear sunscreen to prevent skin cancer.    See your dentist every six months for an exam and cleaning.    Patient Education     Adult Acne    If your skin is erupting with blemishes that you thought could only affect a teenager, you may have adult acne. This is acne in people over the age of 25. Acne in teenagers is more common in teen boys. Acne in adults is more common in women.  Acne is the term for oil-clogged pores. Pores are tiny openings on the skin that become inflamed and form blemishes. Adult acne blemishes show up mainly on the face. In women, blemishes tend to show up  around the chin, mouth, jawline, and neck. In men, acne often affects the entire face. But the trunk and upper arms can also be involved.  What causes acne?  Male hormones (androgens) may cause acne in some people. Women with certain conditions such as polycystic ovarian syndrome may make too much male hormones. Acne in women often may happen just before their menstrual periods. If you had acne when you were a teenager or if others in your family have had acne, you may be at more risk for adult acne. The good news is that acne can be treated. Treatments can also decrease the scarring and changes to skin color caused by acne.  Types of acne  Acne happens when certain hair follicles are damaged. One or more of 4 things happen:    The hair follicle is blocked by dead cells and oil (sebum)    The follicle makes more oil (sebum) than normal    Bacteria (P. acnes) grow in the follicle    The follicle becomes irritated (inflamed)  Four types of blemishes can appear:    Whiteheads are round, white blemishes that form when hair follicles become clogged.    Blackheads are round, dark blemishes that form when whiteheads reach the skin s surface and touch air.    Pimples are red, swollen bumps that form when plugged follicle walls break near the skin s surface.    Deep cysts are pus-filled pimples. They form when plugged follicle walls break deep within the skin. Acne cysts are often large and painful. In some cases, they also cause scars.  How treatment can help  The goals of treatment are to keep new acne blemishes from forming and to prevent scarring and changes in skin color. You will usually need a combination of treatments. You may need to use a treatment for at least 2 months to see if it works. This is because acne lesions take at least 8 weeks to develop.  Your treatment will depend on how serious your acne is. You and your healthcare provider can discuss the best way to treat and control it. In most cases, acne  treatment includes:    Good skin care that doesn t damage or irritate skin    Medicines put on the skin (topical)    Antibiotics, hormones, or both  Often you will need to take several medicines at first. For some treatments, women must use a birth control method so they won t get pregnant while being treated.  Your healthcare provider may also remove blemishes or give you injections. If you have acne scars, you may need surgery or medicines to help improve the way your skin looks. Be sure you understand your treatment plan and any side effects it might cause. You will play an important role in the success of your treatment.  Date Last Reviewed: 2/1/2017 2000-2019 [employer]. [address] [place] [address], [place] [address]. All rights reserved. This information is not intended as a substitute for professional medical care. Always follow your healthcare professional's instructions.

## 2020-03-06 ENCOUNTER — OFFICE VISIT (OUTPATIENT)
Dept: FAMILY MEDICINE | Facility: CLINIC | Age: 30
End: 2020-03-06
Payer: COMMERCIAL

## 2020-03-06 VITALS
OXYGEN SATURATION: 98 % | WEIGHT: 176 LBS | DIASTOLIC BLOOD PRESSURE: 60 MMHG | SYSTOLIC BLOOD PRESSURE: 102 MMHG | RESPIRATION RATE: 16 BRPM | HEART RATE: 81 BPM | BODY MASS INDEX: 28.28 KG/M2 | HEIGHT: 66 IN

## 2020-03-06 DIAGNOSIS — Z00.00 ROUTINE GENERAL MEDICAL EXAMINATION AT A HEALTH CARE FACILITY: Primary | ICD-10-CM

## 2020-03-06 DIAGNOSIS — L70.0 ACNE VULGARIS: ICD-10-CM

## 2020-03-06 PROCEDURE — 99213 OFFICE O/P EST LOW 20 MIN: CPT | Mod: 25 | Performed by: FAMILY MEDICINE

## 2020-03-06 PROCEDURE — 99395 PREV VISIT EST AGE 18-39: CPT | Performed by: FAMILY MEDICINE

## 2020-03-06 RX ORDER — CLINDAMYCIN PHOSPHATE 10 MG/G
GEL TOPICAL 2 TIMES DAILY
Qty: 60 G | Refills: 2 | Status: SHIPPED | OUTPATIENT
Start: 2020-03-06 | End: 2022-11-02

## 2020-03-06 ASSESSMENT — MIFFLIN-ST. JEOR: SCORE: 1542.14

## 2020-03-30 ENCOUNTER — APPOINTMENT (OUTPATIENT)
Dept: OBGYN | Facility: CLINIC | Age: 30
End: 2020-03-30
Payer: COMMERCIAL

## 2020-03-30 ENCOUNTER — PRENATAL OFFICE VISIT (OUTPATIENT)
Dept: OBGYN | Facility: CLINIC | Age: 30
End: 2020-03-30

## 2020-03-30 DIAGNOSIS — Z34.00 PRENATAL CARE, FIRST PREGNANCY: ICD-10-CM

## 2020-03-30 PROCEDURE — 99207 ZZC NO CHARGE NURSE ONLY: CPT | Performed by: OBSTETRICS & GYNECOLOGY

## 2020-03-30 RX ORDER — PRENATAL VIT/IRON FUM/FOLIC AC 27MG-0.8MG
1 TABLET ORAL DAILY
Status: ON HOLD | COMMUNITY
Start: 2020-03-23 | End: 2020-12-06

## 2020-04-20 ENCOUNTER — PRENATAL OFFICE VISIT (OUTPATIENT)
Dept: OBGYN | Facility: CLINIC | Age: 30
End: 2020-04-20
Payer: COMMERCIAL

## 2020-04-20 VITALS
HEIGHT: 65 IN | SYSTOLIC BLOOD PRESSURE: 124 MMHG | RESPIRATION RATE: 16 BRPM | HEART RATE: 93 BPM | TEMPERATURE: 98.1 F | DIASTOLIC BLOOD PRESSURE: 78 MMHG | WEIGHT: 172.9 LBS | BODY MASS INDEX: 28.81 KG/M2

## 2020-04-20 DIAGNOSIS — Z34.01 ENCOUNTER FOR PRENATAL CARE IN FIRST TRIMESTER OF FIRST PREGNANCY: Primary | ICD-10-CM

## 2020-04-20 LAB
ABO + RH BLD: NORMAL
ABO + RH BLD: NORMAL
ALBUMIN UR-MCNC: NEGATIVE MG/DL
APPEARANCE UR: CLEAR
BILIRUB UR QL STRIP: NEGATIVE
BLD GP AB SCN SERPL QL: NORMAL
BLOOD BANK CMNT PATIENT-IMP: NORMAL
COLOR UR AUTO: YELLOW
ERYTHROCYTE [DISTWIDTH] IN BLOOD BY AUTOMATED COUNT: 12.9 % (ref 10–15)
GLUCOSE UR STRIP-MCNC: NEGATIVE MG/DL
HBV SURFACE AG SERPL QL IA: NONREACTIVE
HCT VFR BLD AUTO: 41.3 % (ref 35–47)
HGB BLD-MCNC: 14.1 G/DL (ref 11.7–15.7)
HGB UR QL STRIP: NEGATIVE
HIV 1+2 AB+HIV1 P24 AG SERPL QL IA: NONREACTIVE
KETONES UR STRIP-MCNC: NEGATIVE MG/DL
LEUKOCYTE ESTERASE UR QL STRIP: NEGATIVE
MCH RBC QN AUTO: 30.5 PG (ref 26.5–33)
MCHC RBC AUTO-ENTMCNC: 34.1 G/DL (ref 31.5–36.5)
MCV RBC AUTO: 89 FL (ref 78–100)
NITRATE UR QL: NEGATIVE
PH UR STRIP: 7 PH (ref 5–7)
PLATELET # BLD AUTO: 179 10E9/L (ref 150–450)
RBC # BLD AUTO: 4.63 10E12/L (ref 3.8–5.2)
SOURCE: NORMAL
SP GR UR STRIP: 1.02 (ref 1–1.03)
SPECIMEN EXP DATE BLD: NORMAL
T PALLIDUM AB SER QL: NONREACTIVE
UROBILINOGEN UR STRIP-ACNC: 0.2 EU/DL (ref 0.2–1)
WBC # BLD AUTO: 6.9 10E9/L (ref 4–11)

## 2020-04-20 PROCEDURE — 86901 BLOOD TYPING SEROLOGIC RH(D): CPT | Performed by: OBSTETRICS & GYNECOLOGY

## 2020-04-20 PROCEDURE — 99207 ZZC FIRST OB VISIT: CPT | Performed by: OBSTETRICS & GYNECOLOGY

## 2020-04-20 PROCEDURE — 87491 CHLMYD TRACH DNA AMP PROBE: CPT | Performed by: OBSTETRICS & GYNECOLOGY

## 2020-04-20 PROCEDURE — 86762 RUBELLA ANTIBODY: CPT | Performed by: OBSTETRICS & GYNECOLOGY

## 2020-04-20 PROCEDURE — 86900 BLOOD TYPING SEROLOGIC ABO: CPT | Performed by: OBSTETRICS & GYNECOLOGY

## 2020-04-20 PROCEDURE — 85027 COMPLETE CBC AUTOMATED: CPT | Performed by: OBSTETRICS & GYNECOLOGY

## 2020-04-20 PROCEDURE — 87086 URINE CULTURE/COLONY COUNT: CPT | Performed by: OBSTETRICS & GYNECOLOGY

## 2020-04-20 PROCEDURE — 86850 RBC ANTIBODY SCREEN: CPT | Performed by: OBSTETRICS & GYNECOLOGY

## 2020-04-20 PROCEDURE — 36415 COLL VENOUS BLD VENIPUNCTURE: CPT | Performed by: OBSTETRICS & GYNECOLOGY

## 2020-04-20 PROCEDURE — 87340 HEPATITIS B SURFACE AG IA: CPT | Performed by: OBSTETRICS & GYNECOLOGY

## 2020-04-20 PROCEDURE — 81003 URINALYSIS AUTO W/O SCOPE: CPT | Performed by: OBSTETRICS & GYNECOLOGY

## 2020-04-20 PROCEDURE — 87591 N.GONORRHOEAE DNA AMP PROB: CPT | Performed by: OBSTETRICS & GYNECOLOGY

## 2020-04-20 PROCEDURE — 76817 TRANSVAGINAL US OBSTETRIC: CPT | Performed by: OBSTETRICS & GYNECOLOGY

## 2020-04-20 PROCEDURE — 87389 HIV-1 AG W/HIV-1&-2 AB AG IA: CPT | Performed by: OBSTETRICS & GYNECOLOGY

## 2020-04-20 PROCEDURE — 86780 TREPONEMA PALLIDUM: CPT | Performed by: OBSTETRICS & GYNECOLOGY

## 2020-04-20 ASSESSMENT — MIFFLIN-ST. JEOR: SCORE: 1524.11

## 2020-04-20 NOTE — NURSING NOTE
"Initial /78 (BP Location: Right arm, Patient Position: Chair, Cuff Size: Adult Regular)   Pulse 93   Temp 98.1  F (36.7  C) (Tympanic)   Resp 16   Ht 1.657 m (5' 5.25\")   Wt 78.4 kg (172 lb 14.4 oz)   LMP 02/21/2020 (Exact Date)   BMI 28.55 kg/m   Estimated body mass index is 28.55 kg/m  as calculated from the following:    Height as of this encounter: 1.657 m (5' 5.25\").    Weight as of this encounter: 78.4 kg (172 lb 14.4 oz). .      "

## 2020-04-20 NOTE — PROGRESS NOTES
[facility]   OB/GYN Clinic    CC: New OB     Subjective:    Indigo is a 29 year old  at 8w3d by Patient's last menstrual period was 2020 (exact date). who presents for her initial OB visit. This is a planned pregnancy and her and her partner Mello are excited. She reports feeling well. Denies any uterine cramping, abdominal pain or vaginal bleeding. Denies nausea and vomiting.   Prior to a +UPT, she reports regular monthly periods without contraception use.     OB History    Para Term  AB Living   1 0 0 0 0 0   SAB TAB Ectopic Multiple Live Births   0 0 0 0 0      # Outcome Date GA Lbr Shalom/2nd Weight Sex Delivery Anes PTL Lv   1 Current                History reviewed. No pertinent past medical history.    Past Surgical History:   Procedure Laterality Date     HC TOOTH EXTRACTION W/FORCEP      wisdom teeth     SURGICAL HISTORY OF -       Bone-patellar-bone left knee anterior cruciate ligament reconstruction       Current Outpatient Medications   Medication Sig Dispense Refill     clindamycin (CLINDAMAX) 1 % external gel Apply topically 2 times daily 60 g 2     Prenatal Vit-Fe Fumarate-FA (PRENATAL MULTIVITAMIN W/IRON) 27-0.8 MG tablet Take 1 tablet by mouth daily         Family History   Problem Relation Age of Onset     Anxiety Disorder Mother      Hypertension Maternal Grandmother      Breast Cancer Maternal Grandmother      Heart Failure Maternal Grandfather      Alcoholism Maternal Grandfather      Allergies Brother      C.A.D. No family hx of      Diabetes No family hx of      Cerebrovascular Disease No family hx of      Cancer - colorectal No family hx of        Social History     Tobacco Use     Smoking status: Never Smoker     Smokeless tobacco: Never Used     Tobacco comment: parents smoke   Substance Use Topics     Alcohol use: Yes     Comment: rare-quit with pregnancy       ROS: A 10 pt ROS was completed and found to be negative unless mentioned in the HPI.  "    Objective:  /78 (BP Location: Right arm, Patient Position: Chair, Cuff Size: Adult Regular)   Pulse 93   Temp 98.1  F (36.7  C) (Tympanic)   Resp 16   Ht 1.657 m (5' 5.25\")   Wt 78.4 kg (172 lb 14.4 oz)   LMP 2020 (Exact Date)   BMI 28.55 kg/m      Estimated body mass index is 28.55 kg/m  as calculated from the following:    Height as of this encounter: 1.657 m (5' 5.25\").    Weight as of this encounter: 78.4 kg (172 lb 14.4 oz).    Physical Exam:  Gen: Pleasant, talkative female in no apparent distress   Respiratory: Lungs clear, breathing comfortably on room air   Cardiac: Regular rate and rhythm with no murmurs, gallops or rubs. Warm and well-perfused.   GI: Abd soft and non-tender  : External genitalia is free of lesion. Urethra and bartholin glands normal.  Vaginal mucosa is moist and pink without unusual discharge.  Cervix is without lesions or discharge.  Pap smear and endocervical sampling obtained without incident. Adenexa are mobile and non-tender bilaterally. No appreciable adnexal enlargement. Uterus is consistent with a 8 week gestation.   MSK: Grossly normal movement of all four extremities  Psych: mood and affect bright   Lower extremity: edema not present     Transvaginal US: brandon IUP at 8w0d, +cardiac activity, adnexa with 3.1cm right simple cyst          Assessment/Plan:   29 year old  at 8w3d by LMP of Patient's last menstrual period was 2020 (exact date). c/w 8w0d US who presents for her initial OB visit.   1) Plan to draw new OB lab today (T&S, CBC, HIV, RPR, HepBsAg, Hep B antibody, rubella, GC/Chlam, UC)  2) Discussed routine prenatal care, group practice model at [facility], tertiary support and frequency of visits. Options for  testing for chromosomal and birth defects were discussed with the patient including nuchal translucency/blood marker testing in the first trimester, progenity and quad screening and/or Level 2 ultrasound in the " second trimester. We discussed that these are screening tests and not diagnostic tests and that false positives and negatives are a distinct possibility. We discussed that follow up diagnostic testing would include chorionic villus sampling or amniocentesis depending on gestational age. Written information provided. Patient desires QUAD for genetic testing. Discussed risk of zika infection with travel and subsequent pregnancy risks. Referred to [facility] for further information.   3) Plan for dating/viability scan now - confirmed ANIA 11/27/2020  4) Concerns: none  5) PMH/OBHx problems: none  6) Medication review: no changes, continue prenatal vitamin   7) Reviewed ectopic and miscarriage precautions (present to ED or call clinic with abdominal pain, vaginal bleeding or fever)   8) Weight gain: discussed weight gain expectations ( BMI 25-30: 15-25lbs)   9) Delivery plan: continue to discuss, breastfeeding, pp contraception, pain management in labor - continue to discuss  10) Immunizations: flu shot in the fall, Tdap at 28wks  11) No increased risk for gestational diabetes for plan for screen at 28wks     Return to clinic in for weeks for in-person, patient declines telephone visit for this     Charlene Celis MD  OB/GYN  4/20/2020

## 2020-04-21 LAB
BACTERIA SPEC CULT: NO GROWTH
C TRACH DNA SPEC QL NAA+PROBE: NEGATIVE
Lab: NORMAL
N GONORRHOEA DNA SPEC QL NAA+PROBE: NEGATIVE
RUBV IGG SERPL IA-ACNC: 23 IU/ML
SPECIMEN SOURCE: NORMAL

## 2020-05-18 ENCOUNTER — PRENATAL OFFICE VISIT (OUTPATIENT)
Dept: OBGYN | Facility: CLINIC | Age: 30
End: 2020-05-18
Payer: COMMERCIAL

## 2020-05-18 VITALS
WEIGHT: 172 LBS | BODY MASS INDEX: 28.66 KG/M2 | SYSTOLIC BLOOD PRESSURE: 110 MMHG | DIASTOLIC BLOOD PRESSURE: 69 MMHG | RESPIRATION RATE: 18 BRPM | HEART RATE: 79 BPM | TEMPERATURE: 98.7 F | HEIGHT: 65 IN

## 2020-05-18 DIAGNOSIS — Z34.91 PRENATAL CARE, FIRST TRIMESTER: Primary | ICD-10-CM

## 2020-05-18 PROCEDURE — 99207 ZZC PRENATAL VISIT: CPT | Performed by: OBSTETRICS & GYNECOLOGY

## 2020-05-18 ASSESSMENT — MIFFLIN-ST. JEOR: SCORE: 1515.03

## 2020-05-18 NOTE — NURSING NOTE
"Initial /69 (BP Location: Left arm, Patient Position: Chair, Cuff Size: Adult Regular)   Pulse 79   Temp 98.7  F (37.1  C) (Tympanic)   Resp 18   Ht 1.657 m (5' 5.25\")   Wt 78 kg (172 lb)   LMP 02/21/2020 (Exact Date)   BMI 28.40 kg/m   Estimated body mass index is 28.4 kg/m  as calculated from the following:    Height as of this encounter: 1.657 m (5' 5.25\").    Weight as of this encounter: 78 kg (172 lb). .      "

## 2020-05-18 NOTE — PROGRESS NOTES
"[facility]   OB/GYN Clinic    CC: Return OB     Subjective:    Indigo is a 30 year old  at 12w3d a]who presents for return OB visit. She reports feeling well. Denies uterine cramping, vaginal bleeding or leaking, dysuria.     Objective:  /69 (BP Location: Left arm, Patient Position: Chair, Cuff Size: Adult Regular)   Pulse 79   Temp 98.7  F (37.1  C) (Tympanic)   Resp 18   Ht 1.657 m (5' 5.25\")   Wt 78 kg (172 lb)   LMP 2020 (Exact Date)   BMI 28.40 kg/m      Estimated body mass index is 28.4 kg/m  as calculated from the following:    Height as of this encounter: 1.657 m (5' 5.25\").    Weight as of this encounter: 78 kg (172 lb).    Physical Exam:  Gen: Pleasant, talkative female in no apparent distress   Respiratory: breathing comfortably on room air   Cardiac: Warm and well-perfused.   GI: Abd soft and non-tender, gravid  MSK: Grossly normal movement of all four extremities  Psych: mood and affect bright   Lower extremity: edema not present     Fetal dop tones: 150s bpm    Assessment/Plan:   30 year old  at 12w3d awho presents for follow-up OB visit.   1) New OB lab; T&S, CBC, HIV, RPR, HepBsAg, Hep B antibody, rubella, GC/Chlam WNL. Rh neg Plan for 3rd tri lab at 28wks.   2) Genetics testing: quad, ordered  3) plan for anatomy scan at 18wks  4) Reviewed 2nd/ trimester precautions (call clinic or present to OB triage/ED with vaginal bleeding, cramping, decreased fetal movement, inability to take po)  5) Immunizations: flu shot already given, Tdap at 28wks    Return to clinic in 4 weeks.     Glenna Cardozo MD   2020 9:53 AM     "

## 2020-06-18 ENCOUNTER — VIRTUAL VISIT (OUTPATIENT)
Dept: OBGYN | Facility: CLINIC | Age: 30
End: 2020-06-18
Payer: COMMERCIAL

## 2020-06-18 DIAGNOSIS — Z34.02 ENCOUNTER FOR PRENATAL CARE IN SECOND TRIMESTER OF FIRST PREGNANCY: Primary | ICD-10-CM

## 2020-06-18 PROCEDURE — 99207 ZZC PRENATAL VISIT: CPT | Mod: TEL | Performed by: OBSTETRICS & GYNECOLOGY

## 2020-06-18 NOTE — PROGRESS NOTES
"Indigo Lamb is a 30 year old female who is being evaluated via a billable telephone visit.      The patient has been notified of following:     \"This telephone visit will be conducted via a call between you and your physician/provider. We have found that certain health care needs can be provided without the need for a physical exam.  This service lets us provide the care you need with a short phone conversation.  If a prescription is necessary we can send it directly to your pharmacy.  If lab work is needed we can place an order for that and you can then stop by our lab to have the test done at a later time.    Telephone visits are billed at different rates depending on your insurance coverage. During this emergency period, for some insurers they may be billed the same as an in-person visit.  Please reach out to your insurance provider with any questions.    If during the course of the call the physician/provider feels a telephone visit is not appropriate, you will not be charged for this service.\"    Patient has given verbal consent for Telephone visit?  Yes    What phone number would you like to be contacted at? [phone]    How would you like to obtain your AVS? [facility]    Phone call duration: 5 minutes    Gissel Kinsey MD    CC: Here for routine phone prenatal visit @ 16w6d   HPI:  Feeling well, more congested; no fetal movement yet; probably not getting QUAD screen    PE: LMP 02/21/2020 (Exact Date)    See OB flowsheet      A:  1. Encounter for prenatal care in second trimester of first pregnancy    - US OB > 14 Weeks; Future      Routine prenatal care  RTC 4 weeks.      Gissel Kinsey M.D.       "

## 2020-06-30 ENCOUNTER — TELEPHONE (OUTPATIENT)
Dept: OBGYN | Facility: CLINIC | Age: 30
End: 2020-06-30

## 2020-06-30 NOTE — TELEPHONE ENCOUNTER
Reason for Call:  Other     Detailed comments: EDC:  11/27/2020    Pt is experiencing low back pain/spasms since yesterday, making it very difficult to walk. Pain happened while bending over to place a mattress pad - Please contact pt     PH[facility]Y:  [facility]    Phone Number Patient can be reached at: Home number on file [phone] (home)    Best Time: Any    Can we leave a detailed message on this number? YES    Call taken on 6/30/2020 at 8:32 AM by Denise Behrendt

## 2020-06-30 NOTE — TELEPHONE ENCOUNTER
Return call to patient.  Spoke with patient on the phone.    S-(situation): Patient tearful as back is having spasms and bringing patient to her knees. Patient reports she bent over to place a mattress pad and low back started to spasm. Patient denies any vaginal bleeding or abnormal discharge. Patient reports she has a doppler at home and fetal heart tones are in the 130-140's. Patient has been applying ice and heat. Considering IcyHot.    B-(background):  at 18w4d    A-(assessment): low back pain    R-(recommendations): Reassurance provided. Recommended patient see PCP or Urgent Care for further evaluation. Continue with ice and heat. Rest as able. May consider something like Flexeril after evaluation. Call with bleeding or abnormal vaginal discharge.     Pt in agreement and reports understanding.    Antonette Sabillon   Ob/Gyn Clinic  RN

## 2020-07-01 ENCOUNTER — OFFICE VISIT (OUTPATIENT)
Dept: FAMILY MEDICINE | Facility: CLINIC | Age: 30
End: 2020-07-01
Payer: COMMERCIAL

## 2020-07-01 VITALS
BODY MASS INDEX: 27.51 KG/M2 | TEMPERATURE: 98.8 F | OXYGEN SATURATION: 99 % | DIASTOLIC BLOOD PRESSURE: 66 MMHG | SYSTOLIC BLOOD PRESSURE: 118 MMHG | HEART RATE: 99 BPM | HEIGHT: 66 IN | WEIGHT: 171.2 LBS

## 2020-07-01 DIAGNOSIS — M54.50 ACUTE BILATERAL LOW BACK PAIN WITHOUT SCIATICA: Primary | ICD-10-CM

## 2020-07-01 PROCEDURE — 99213 OFFICE O/P EST LOW 20 MIN: CPT | Performed by: INTERNAL MEDICINE

## 2020-07-01 RX ORDER — LIDOCAINE 50 MG/G
1 PATCH TOPICAL EVERY 24 HOURS
Qty: 30 PATCH | Refills: 1 | Status: SHIPPED | OUTPATIENT
Start: 2020-07-01 | End: 2020-09-04

## 2020-07-01 ASSESSMENT — MIFFLIN-ST. JEOR: SCORE: 1516.18

## 2020-07-01 NOTE — PROGRESS NOTES
Subjective     Indigo Lamb is a 30 year old female who presents to clinic today for the following health issues:  Chief Complaint   Patient presents with     Back Pain     x 2 days, pt injured low/center back putting on a fitted sheet on the bed        HPI   Back Pain       Duration: x 2 days         Specific cause: injured low/center back putting on fitted sheet on bed when she stood up  Patient reports she could not walk yesterday, had to crawl    Description:   Location of pain: low back center  Character of pain: constant pain and sharp/shoot w/ touch   Pain radiation: occasional bilateral thigh w/ dull ache when sitting   New numbness or weakness in legs, not attributed to pain:  no     Intensity: Currently 4/10, At its worst 10/10    History:   Pain interferes with job: YES   History of back problems: no prior back problems  Any previous MRI or X-rays: None  Sees a specialist for back pain:  No  Therapies tried without relief: Tylenol (650mg every 6-8 hours),    Alleviating factors:   Improved by: cold and heat, stretches, salt baths, IcyHot.  All slightly helpful    Precipitating factors:  Worsened by: Walking      Accompanying Signs & Symptoms:  Risk of Fracture:  None  Risk of Cauda Equina:  None  Risk of Infection:  None  Risk of Cancer:  None  Risk of Ankylosing Spondylitis:  Onset at age <35, male, AND morning back stiffness. no        Patient Active Problem List   Diagnosis     CARDIOVASCULAR SCREENING; LDL GOAL LESS THAN 160     Dysmenorrhea     Recurrent UTI     Family history of nephrolithiasis     Vestibular dysfunction, unspecified laterality     Prenatal care, first pregnancy     Past Surgical History:   Procedure Laterality Date     HC TOOTH EXTRACTION W/FORCEP      wisdom teeth     SURGICAL HISTORY OF -   2008    Bone-patellar-bone left knee anterior cruciate ligament reconstruction       Social History     Tobacco Use     Smoking status: Never Smoker     Smokeless tobacco: Never Used      "Tobacco comment: parents smoke   Substance Use Topics     Alcohol use: Yes     Comment: rare-quit with pregnancy     Family History   Problem Relation Age of Onset     Anxiety Disorder Mother      Hypertension Maternal Grandmother      Breast Cancer Maternal Grandmother      Heart Failure Maternal Grandfather      Alcoholism Maternal Grandfather      Allergies Brother      C.A.D. No family hx of      Diabetes No family hx of      Cerebrovascular Disease No family hx of      Cancer - colorectal No family hx of          Current Outpatient Medications   Medication Sig Dispense Refill     Prenatal Vit-Fe Fumarate-FA (PRENATAL MULTIVITAMIN W/IRON) 27-0.8 MG tablet Take 1 tablet by mouth daily       clindamycin (CLINDAMAX) 1 % external gel Apply topically 2 times daily 60 g 2     Allergies   Allergen Reactions     Penicillins Rash     Zithromax [Azithromycin Dihydrate] Rash       Reviewed and updated as needed this visit by Provider         Review of Systems   Constitutional, MSK systems are negative, except as otherwise noted.      Objective    /66 (BP Location: Left arm, Patient Position: Sitting, Cuff Size: Adult Regular)   Pulse 99   Temp 98.8  F (37.1  C) (Tympanic)   Ht 1.665 m (5' 5.55\")   Wt 77.7 kg (171 lb 3.2 oz)   LMP 02/21/2020 (Exact Date)   SpO2 99%   BMI 28.01 kg/m    Body mass index is 28.01 kg/m .  Physical Exam     GENERAL: healthy, alert and no distress  BACK: Pain to palpation over bilateral lower back, reduced ROM due to pain  NEURO: Normal strength and tone in legs, normal light touch sensation, symmetric Patellar reflexes           Assessment & Plan     1. Acute bilateral low back pain without sciatica    Indigo presents with 2 days of severe acute low back pain without sciatica.  Occurred after standing back up from bending over, mechanism suggests likely muscle spasm.  Disc herniation could be possible, but no significant radicular signs to suggest nerve impingement.  No other red " flags to indicate emergent MRI.  No trauma to suggest fracture, so did not recommend x-ray either.  Medication options limited by her current pregnancy.  She can continue Tylenol and other home cares and also suggested topical lidocaine.  She would also like to try PT.  Work note provided to remain on light duty of off for the rest of the week, return next week if improved, call if not improved.  Consider MRI if pain not resolved in 3 months or more worrisome symptoms develop.     - PHYSICAL THERAPY REFERRAL; Future  - lidocaine (LIDODERM) 5 % patch; Place 1 patch onto the skin every 24 hours To prevent lidocaine toxicity, patient should be patch free for 12 hrs daily.  Dispense: 30 patch; Refill: 1         Return in about 1 week (around 7/8/2020) for call if not improving.    Pj Santamaria MD  [facility]

## 2020-07-01 NOTE — LETTER
[facility]  [address], [place] [address]  Phone: [phone]    July 1, 2020        Indigo Lamb  [address] [address]          To whom it may concern:    RE: Indigo Lamb    Patient was seen and treated today at our clinic.  She should either be on light duty or remain off work if light duty not available for the rest of the week.  Can return to regular duty next week if feeling improved.     Please contact me for questions or concerns.      Sincerely,        Pj Santamaria MD

## 2020-07-06 ENCOUNTER — MYC MEDICAL ADVICE (OUTPATIENT)
Dept: FAMILY MEDICINE | Facility: CLINIC | Age: 30
End: 2020-07-06

## 2020-07-06 NOTE — TELEPHONE ENCOUNTER
Provider covering for Dr. Santamaria,      Please see my chart message.  Patient is in need of a work note.  Thank you, Jennifer OLIVARES RN

## 2020-07-06 NOTE — LETTER
[facility]  [address] [address]  Phone: [phone]      July 6, 2020      RE: Indigo Lamb  [address] [address] [address]        To whom it may concern:    Indigo Lamb is under my professional care. The employee is ABLE to return to work today.    When the patient returns to work, the following restrictions apply until 7/13/2020:  A) Bend: Frequently (4-8 hours)  B) Squat: Frequently (4-8 hours)  C) Walk/Stand: Frequently (4-8 hours)  D) Reach Above Shoulders: Frequently (4-8 hours)  E) Lift, carry, push, and pull no more than:  0-10 lbs.Light duty-unable to lift more than 10 pounds.      Sincerely,    Elizabeth Morgan, CNP

## 2020-07-10 ENCOUNTER — PRENATAL OFFICE VISIT (OUTPATIENT)
Dept: OBGYN | Facility: CLINIC | Age: 30
End: 2020-07-10
Payer: COMMERCIAL

## 2020-07-10 ENCOUNTER — HOSPITAL ENCOUNTER (OUTPATIENT)
Dept: ULTRASOUND IMAGING | Facility: CLINIC | Age: 30
Discharge: HOME | End: 2020-07-10
Attending: OBSTETRICS & GYNECOLOGY | Admitting: OBSTETRICS & GYNECOLOGY
Payer: COMMERCIAL

## 2020-07-10 VITALS
WEIGHT: 174.6 LBS | DIASTOLIC BLOOD PRESSURE: 65 MMHG | RESPIRATION RATE: 16 BRPM | TEMPERATURE: 98.6 F | HEIGHT: 65 IN | BODY MASS INDEX: 29.09 KG/M2 | SYSTOLIC BLOOD PRESSURE: 113 MMHG | HEART RATE: 84 BPM

## 2020-07-10 DIAGNOSIS — Z34.02 ENCOUNTER FOR PRENATAL CARE IN SECOND TRIMESTER OF FIRST PREGNANCY: ICD-10-CM

## 2020-07-10 DIAGNOSIS — Z34.02 ENCOUNTER FOR PRENATAL CARE IN SECOND TRIMESTER OF FIRST PREGNANCY: Primary | ICD-10-CM

## 2020-07-10 PROCEDURE — 99207 ZZC PRENATAL VISIT: CPT | Performed by: OBSTETRICS & GYNECOLOGY

## 2020-07-10 PROCEDURE — 76805 OB US >/= 14 WKS SNGL FETUS: CPT

## 2020-07-10 ASSESSMENT — MIFFLIN-ST. JEOR: SCORE: 1526.82

## 2020-07-10 NOTE — PROGRESS NOTES
"CC: Here for routine prenatal visit @ 20w0d   HPI: + FM, no ctx, no LOF, no VB.  No complaints.     PE: /65 (BP Location: Right arm, Patient Position: Chair, Cuff Size: Adult Regular)   Pulse 84   Temp 98.6  F (37  C) (Tympanic)   Resp 16   Ht 1.657 m (5' 5.25\")   Wt 79.2 kg (174 lb 9.6 oz)   LMP 02/21/2020 (Exact Date)   Breastfeeding No   BMI 28.83 kg/m     See OB flowsheet    U/S WNL except limited views.     A/P G1 @ 20w0d normal pregnancy    1. Routine prenatal care.  Repeat scan ordered for follow up views.  COVID restrictions and recommendations reviewed including iron supplementation.     RTC 4 weeks.      Evelin Fonseca M.D.    "

## 2020-07-10 NOTE — NURSING NOTE
"Initial /65 (BP Location: Right arm, Patient Position: Chair, Cuff Size: Adult Regular)   Pulse 84   Temp 98.6  F (37  C) (Tympanic)   Resp 16   Ht 1.657 m (5' 5.25\")   Wt 79.2 kg (174 lb 9.6 oz)   LMP 02/21/2020 (Exact Date)   Breastfeeding No   BMI 28.83 kg/m   Estimated body mass index is 28.83 kg/m  as calculated from the following:    Height as of this encounter: 1.657 m (5' 5.25\").    Weight as of this encounter: 79.2 kg (174 lb 9.6 oz). .    Amy Almonte, [facility]    "

## 2020-07-28 ENCOUNTER — MYC MEDICAL ADVICE (OUTPATIENT)
Dept: FAMILY MEDICINE | Facility: CLINIC | Age: 30
End: 2020-07-28

## 2020-07-30 ENCOUNTER — HOSPITAL ENCOUNTER (OUTPATIENT)
Dept: ULTRASOUND IMAGING | Facility: CLINIC | Age: 30
Discharge: HOME | End: 2020-07-30
Attending: OBSTETRICS & GYNECOLOGY | Admitting: OBSTETRICS & GYNECOLOGY
Payer: COMMERCIAL

## 2020-07-30 DIAGNOSIS — Z34.02 ENCOUNTER FOR PRENATAL CARE IN SECOND TRIMESTER OF FIRST PREGNANCY: ICD-10-CM

## 2020-07-30 PROCEDURE — 76816 OB US FOLLOW-UP PER FETUS: CPT

## 2020-08-06 ENCOUNTER — OFFICE VISIT (OUTPATIENT)
Dept: FAMILY MEDICINE | Facility: CLINIC | Age: 30
End: 2020-08-06
Payer: COMMERCIAL

## 2020-08-06 ENCOUNTER — VIRTUAL VISIT (OUTPATIENT)
Dept: OBGYN | Facility: CLINIC | Age: 30
End: 2020-08-06
Payer: COMMERCIAL

## 2020-08-06 VITALS
HEART RATE: 86 BPM | SYSTOLIC BLOOD PRESSURE: 112 MMHG | HEIGHT: 66 IN | TEMPERATURE: 97.6 F | WEIGHT: 183 LBS | DIASTOLIC BLOOD PRESSURE: 64 MMHG | OXYGEN SATURATION: 98 % | BODY MASS INDEX: 29.41 KG/M2

## 2020-08-06 VITALS — BODY MASS INDEX: 29.99 KG/M2 | WEIGHT: 183 LBS

## 2020-08-06 DIAGNOSIS — Z34.93 PRENATAL CARE, THIRD TRIMESTER: Primary | ICD-10-CM

## 2020-08-06 DIAGNOSIS — M54.50 ACUTE BILATERAL LOW BACK PAIN WITHOUT SCIATICA: Primary | ICD-10-CM

## 2020-08-06 PROCEDURE — 99212 OFFICE O/P EST SF 10 MIN: CPT | Performed by: INTERNAL MEDICINE

## 2020-08-06 PROCEDURE — 99207 ZZC PRENATAL VISIT: CPT | Performed by: OBSTETRICS & GYNECOLOGY

## 2020-08-06 ASSESSMENT — MIFFLIN-ST. JEOR: SCORE: 1568.89

## 2020-08-06 NOTE — PROGRESS NOTES
Subjective     Indigo Lamb is a 30 year old female who presents to clinic today for the following health issues:    HPI     Patient here for recheck from visit on 7/1/2020. Patient states back pain has resolved- improved after about a week and a half. Patient needs form filled out to return to work.    I saw Indigo on 7/1 for acute low back pain, likely muscle spasm.  She was referred to PT and ended up doing PT from her friend at her home.  She was given a note to be on light duty for work.   She returned back to full duty for a couple of shifts last week and did fine with that.              Patient Active Problem List   Diagnosis     CARDIOVASCULAR SCREENING; LDL GOAL LESS THAN 160     Dysmenorrhea     Recurrent UTI     Family history of nephrolithiasis     Vestibular dysfunction, unspecified laterality     Prenatal care, first pregnancy     Past Surgical History:   Procedure Laterality Date     HC TOOTH EXTRACTION W/FORCEP      wisdom teeth     SURGICAL HISTORY OF -   2008    Bone-patellar-bone left knee anterior cruciate ligament reconstruction       Social History     Tobacco Use     Smoking status: Never Smoker     Smokeless tobacco: Never Used     Tobacco comment: parents smoke   Substance Use Topics     Alcohol use: Yes     Comment: rare-quit with pregnancy     Family History   Problem Relation Age of Onset     Anxiety Disorder Mother      Hypertension Maternal Grandmother      Breast Cancer Maternal Grandmother      Heart Failure Maternal Grandfather      Alcoholism Maternal Grandfather      Allergies Brother      C.A.D. No family hx of      Diabetes No family hx of      Cerebrovascular Disease No family hx of      Cancer - colorectal No family hx of          Current Outpatient Medications   Medication Sig Dispense Refill     clindamycin (CLINDAMAX) 1 % external gel Apply topically 2 times daily 60 g 2     Prenatal Vit-Fe Fumarate-FA (PRENATAL MULTIVITAMIN W/IRON) 27-0.8 MG tablet Take 1 tablet by  "mouth daily       lidocaine (LIDODERM) 5 % patch Place 1 patch onto the skin every 24 hours To prevent lidocaine toxicity, patient should be patch free for 12 hrs daily. (Patient not taking: Reported on 7/10/2020) 30 patch 1     Allergies   Allergen Reactions     Penicillins Rash     Zithromax [Azithromycin Dihydrate] Rash       Reviewed and updated as needed this visit by Provider         Review of Systems   Constitutional, MSK systems are negative, except as otherwise noted.      Objective    /64   Pulse 86   Temp 97.6  F (36.4  C) (Tympanic)   Ht 1.664 m (5' 5.5\")   Wt 83 kg (183 lb)   LMP 02/21/2020 (Exact Date)   SpO2 98%   BMI 29.99 kg/m    Body mass index is 29.99 kg/m .  Physical Exam     GENERAL: healthy, alert and no distress  BACK: No pain to palpation, no pain with ROM,  ROM is very good to twist/tilt/extension/flexion            Assessment & Plan     1. Acute bilateral low back pain without sciatica, resolved    Okay to resume full duty at work.  Form completed, given back to patient, and copy sent to scanning.    Pj Santamaria MD  [facility]  "

## 2020-08-06 NOTE — PROGRESS NOTES
[facility]   OB/GYN Clinic     CC: Return OB      Subjective:     Indigo is a 30 year old  at 23w6d who presents for return OB visit. She reports feeling well. Denies uterine cramping, vaginal bleeding or leaking, dysuria.  +FM     Objective:  Deferred VV      Assessment/Plan:   30 year old  at 23w6d vannesa presents for follow-up OB visit.   1) New OB lab; T&S, CBC, HIV, RPR, HepBsAg, Hep B antibody, rubella, GC/Chlam WNL. Rh neg Plan for 3rd tri lab at 28wks.   2) anatomy scan WNL  3) Reviewed 2nd/ trimester precautions (call clinic or present to OB triage/ED with vaginal bleeding, cramping, decreased fetal movement, inability to take po)  4) Immunizations: flu shot already given, Tdap at 28wks       Glenna Cardozo MD   2020 10:11 AM

## 2020-08-06 NOTE — PROGRESS NOTES
"Indigo Lamb is a 30 year old female who is being evaluated via a billable telephone visit.      The patient has been notified of following:     \"This telephone visit will be conducted via a call between you and your physician/provider. We have found that certain health care needs can be provided without the need for a physical exam.  This service lets us provide the care you need with a short phone conversation.  If a prescription is necessary we can send it directly to your pharmacy.  If lab work is needed we can place an order for that and you can then stop by our lab to have the test done at a later time.    Telephone visits are billed at different rates depending on your insurance coverage. During this emergency period, for some insurers they may be billed the same as an in-person visit.  Please reach out to your insurance provider with any questions.    If during the course of the call the physician/provider feels a telephone visit is not appropriate, you will not be charged for this service.\"    Patient has given verbal consent for Telephone visit?  Yes    What phone number would you like to be contacted at? [phone]    How would you like to [facility] your [facility]    Phone call duration: 8 minutes    Glenna Cardozo MD   8/6/2020 10:10 AM   "

## 2020-08-06 NOTE — PROGRESS NOTES
"Subjective     Indigo Lamb is a 30 year old female who presents to clinic today for the following health issues:    HPI     I saw Indigo on 7/1 for acute low back pain, likely muscle spasm.  She was referred to PT *** and given a note to be on light duty for work.      {SUPERLIST (Optional):557968}  {additonal problems for provider to add (Optional):251803}    {HIST REVIEW/ LINKS 2 (Optional):207696}    Reviewed and updated as needed this visit by Provider         Review of Systems   {ROS COMP (Optional):506567}      Objective    LMP 02/21/2020 (Exact Date)   There is no height or weight on file to calculate BMI.  Physical Exam   {Exam List (Optional):730215}    {Diagnostic Test Results (Optional):896296::\"Diagnostic Test Results:\",\"Labs reviewed in Epic\"}        {PROVIDER CHARTING PREFERENCE:569061}    "

## 2020-09-04 ENCOUNTER — PRENATAL OFFICE VISIT (OUTPATIENT)
Dept: OBGYN | Facility: CLINIC | Age: 30
End: 2020-09-04
Payer: COMMERCIAL

## 2020-09-04 VITALS
HEIGHT: 66 IN | WEIGHT: 187.9 LBS | BODY MASS INDEX: 30.2 KG/M2 | SYSTOLIC BLOOD PRESSURE: 109 MMHG | DIASTOLIC BLOOD PRESSURE: 75 MMHG | HEART RATE: 107 BPM | TEMPERATURE: 97.8 F | RESPIRATION RATE: 20 BRPM

## 2020-09-04 DIAGNOSIS — Z34.03 ENCOUNTER FOR SUPERVISION OF NORMAL FIRST PREGNANCY IN THIRD TRIMESTER: Primary | ICD-10-CM

## 2020-09-04 DIAGNOSIS — Z67.91 RH NEGATIVE STATUS DURING PREGNANCY IN FIRST TRIMESTER: ICD-10-CM

## 2020-09-04 DIAGNOSIS — Z34.02 ENCOUNTER FOR PRENATAL CARE IN SECOND TRIMESTER OF FIRST PREGNANCY: ICD-10-CM

## 2020-09-04 DIAGNOSIS — O26.891 RH NEGATIVE STATUS DURING PREGNANCY IN FIRST TRIMESTER: ICD-10-CM

## 2020-09-04 LAB
ERYTHROCYTE [DISTWIDTH] IN BLOOD BY AUTOMATED COUNT: 12.7 % (ref 10–15)
GLUCOSE 1H P 50 G GLC PO SERPL-MCNC: 94 MG/DL (ref 60–129)
HCT VFR BLD AUTO: 37.7 % (ref 35–47)
HGB BLD-MCNC: 12.7 G/DL (ref 11.7–15.7)
MCH RBC QN AUTO: 30.8 PG (ref 26.5–33)
MCHC RBC AUTO-ENTMCNC: 33.7 G/DL (ref 31.5–36.5)
MCV RBC AUTO: 92 FL (ref 78–100)
PLATELET # BLD AUTO: 208 10E9/L (ref 150–450)
RBC # BLD AUTO: 4.12 10E12/L (ref 3.8–5.2)
WBC # BLD AUTO: 8.4 10E9/L (ref 4–11)

## 2020-09-04 PROCEDURE — 90715 TDAP VACCINE 7 YRS/> IM: CPT | Performed by: OBSTETRICS & GYNECOLOGY

## 2020-09-04 PROCEDURE — 36415 COLL VENOUS BLD VENIPUNCTURE: CPT | Performed by: OBSTETRICS & GYNECOLOGY

## 2020-09-04 PROCEDURE — 90471 IMMUNIZATION ADMIN: CPT | Performed by: OBSTETRICS & GYNECOLOGY

## 2020-09-04 PROCEDURE — 85027 COMPLETE CBC AUTOMATED: CPT | Performed by: OBSTETRICS & GYNECOLOGY

## 2020-09-04 PROCEDURE — 96372 THER/PROPH/DIAG INJ SC/IM: CPT | Performed by: OBSTETRICS & GYNECOLOGY

## 2020-09-04 PROCEDURE — 99207 ZZC PRENATAL VISIT: CPT | Performed by: OBSTETRICS & GYNECOLOGY

## 2020-09-04 PROCEDURE — 82950 GLUCOSE TEST: CPT | Performed by: OBSTETRICS & GYNECOLOGY

## 2020-09-04 PROCEDURE — 86780 TREPONEMA PALLIDUM: CPT | Performed by: OBSTETRICS & GYNECOLOGY

## 2020-09-04 ASSESSMENT — MIFFLIN-ST. JEOR: SCORE: 1591.12

## 2020-09-04 NOTE — PROGRESS NOTES
Prior to immunization administration, verified patients identity using patient s name and date of birth. Please see Immunization Activity for additional information.     Screening Questionnaire for Adult Immunization    Are you sick today?   No   Do you have allergies to medications, food, a vaccine component or latex?   No   Have you ever had a serious reaction after receiving a vaccination?   No   Do you have a long-term health problem with heart, lung, kidney, or metabolic disease (e.g., diabetes), asthma, a blood disorder, no spleen, complement component deficiency, a cochlear implant, or a spinal fluid leak?  Are you on long-term aspirin therapy?   No   Do you have cancer, leukemia, HIV/AIDS, or any other immune system problem?   No   Do you have a parent, brother, or sister with an immune system problem?   No   In the past 3 months, have you taken medications that affect  your immune system, such as prednisone, other steroids, or anticancer drugs; drugs for the treatment of rheumatoid arthritis, Crohn s disease, or psoriasis; or have you had radiation treatments?   No   Have you had a seizure, or a brain or other nervous system problem?   No   During the past year, have you received a transfusion of blood or blood    products, or been given immune (gamma) globulin or antiviral drug?   No   For women: Are you pregnant or is there a chance you could become       pregnant during the next month?   current   Have you received any vaccinations in the past 4 weeks?   No       Per orders of Dr. Rodriguez, injection of tdap/Rhogam given by Carrie Childs CMA. Patient instructed to remain in clinic for 15 minutes afterwards, and to report any adverse reaction to me immediately.       Screening performed by Carrie Childs CMA on 9/4/2020 at 11:16 AM.

## 2020-09-04 NOTE — PROGRESS NOTES
Clinic Administered Medication Documentation      Injectable Medication Documentation    Patient was given Rhogam. Prior to medication administration, verified patients identity using patient s name and date of birth. Please see MAR and medication order for additional information. Patient instructed to remain in clinic for 15 minutes.      Was entire vial of medication used? Yes  Vial/Syringe: Single dose vial  Expiration Date:  3/8/22  Was this medication supplied by the patient? No

## 2020-09-04 NOTE — PROGRESS NOTES
Concerns: Rhogam today- no bleeding  No vaginal bleeding, loss of fluid, or contractions  Reportable signs and symptoms discussed.  Tdap given today  Discussed kick counts and fetal movement.  Discussed PTL, PROM, and when to call or come in.  RTC in 2 weeks V V   Breast pump order provided      Nasim Rodriguez MD

## 2020-09-05 LAB — T PALLIDUM AB SER QL: NONREACTIVE

## 2020-09-21 ENCOUNTER — VIRTUAL VISIT (OUTPATIENT)
Dept: OBGYN | Facility: CLINIC | Age: 30
End: 2020-09-21
Payer: COMMERCIAL

## 2020-09-21 DIAGNOSIS — Z34.93 PRENATAL CARE, THIRD TRIMESTER: Primary | ICD-10-CM

## 2020-09-21 PROCEDURE — 99207 ZZC PRENATAL VISIT: CPT | Performed by: OBSTETRICS & GYNECOLOGY

## 2020-09-21 NOTE — PROGRESS NOTES
"Indigo Lamb is a 30 year old female who is being evaluated via a billable telephone visit.      The patient has been notified of following:     \"This telephone visit will be conducted via a call between you and your physician/provider. We have found that certain health care needs can be provided without the need for a physical exam.  This service lets us provide the care you need with a short phone conversation.  If a prescription is necessary we can send it directly to your pharmacy.  If lab work is needed we can place an order for that and you can then stop by our lab to have the test done at a later time.    Telephone visits are billed at different rates depending on your insurance coverage. During this emergency period, for some insurers they may be billed the same as an in-person visit.  Please reach out to your insurance provider with any questions.    If during the course of the call the physician/provider feels a telephone visit is not appropriate, you will not be charged for this service.\"    Patient has given verbal consent for Telephone visit?  Yes    What phone number would you like to be contacted at? [phone]    How would you like to obtain your AVS? [facility]Get[facility]    Phone call duration: 5 minutes    Glenna Cardozo MD   2020 9:57 AM     [facility]   OB/GYN Clinic     CC: Return OB      Subjective:     Indigo is a 30 year old  at 30w3d  who presents for return OB visit. She reports feeling well. Denies uterine cramping, vaginal bleeding or leaking, dysuria.  +FM     Objective:  Deferred VV      Assessment/Plan:   30 year old  at 30w3d awho presents for follow-up OB visit.   1) New OB lab; T&S, CBC, HIV, RPR, HepBsAg, Hep B antibody, rubella, GC/Chlam WNL. Rh neg, s/p rhogam third tri labs WNL  2) anatomy scan WNL  3) Reviewed 2nd/ trimester precautions (call clinic or present to OB triage/ED with vaginal bleeding, cramping, decreased fetal movement, " inability to take po)  4) Immunizations: flu shot discuss at next visit, Tdap given    Glenna Cardozo MD   9/21/2020 9:58 AM

## 2020-10-02 ENCOUNTER — PRENATAL OFFICE VISIT (OUTPATIENT)
Dept: OBGYN | Facility: CLINIC | Age: 30
End: 2020-10-02
Payer: COMMERCIAL

## 2020-10-02 VITALS
DIASTOLIC BLOOD PRESSURE: 74 MMHG | WEIGHT: 193.4 LBS | HEIGHT: 66 IN | BODY MASS INDEX: 31.08 KG/M2 | HEART RATE: 84 BPM | RESPIRATION RATE: 16 BRPM | SYSTOLIC BLOOD PRESSURE: 116 MMHG

## 2020-10-02 DIAGNOSIS — Z34.03 ENCOUNTER FOR PRENATAL CARE IN THIRD TRIMESTER OF FIRST PREGNANCY: Primary | ICD-10-CM

## 2020-10-02 PROCEDURE — 99207 PR PRENATAL VISIT: CPT | Performed by: OBSTETRICS & GYNECOLOGY

## 2020-10-02 ASSESSMENT — MIFFLIN-ST. JEOR: SCORE: 1616.07

## 2020-10-02 NOTE — NURSING NOTE
"Initial /74 (BP Location: Left arm, Patient Position: Chair, Cuff Size: Adult Regular)   Pulse 84   Resp 16   Ht 1.664 m (5' 5.5\")   Wt 87.7 kg (193 lb 6.4 oz)   LMP 02/21/2020 (Exact Date)   Breastfeeding No   BMI 31.69 kg/m   Estimated body mass index is 31.69 kg/m  as calculated from the following:    Height as of this encounter: 1.664 m (5' 5.5\").    Weight as of this encounter: 87.7 kg (193 lb 6.4 oz). .    Amy Almonte, [facility]    "

## 2020-10-02 NOTE — PROGRESS NOTES
"CC: Here for routine prenatal visit @ 32w0d   HPI: + FM, no ctx, no LOF, no VB.  No complaints.     PE: /74 (BP Location: Left arm, Patient Position: Chair, Cuff Size: Adult Regular)   Pulse 84   Resp 16   Ht 1.664 m (5' 5.5\")   Wt 87.7 kg (193 lb 6.4 oz)   LMP 02/21/2020 (Exact Date)   Breastfeeding No   BMI 31.69 kg/m     See OB flowsheet    A/P G1 @ 32w0d normal pregnancy    1. Routine prenatal care.  COVID restrictions and recommendations reviewed including iron supplementation.     RTC 2 weeks.      Evelin Fonseca M.D.    "

## 2020-10-16 ENCOUNTER — PRENATAL OFFICE VISIT (OUTPATIENT)
Dept: OBGYN | Facility: CLINIC | Age: 30
End: 2020-10-16
Payer: COMMERCIAL

## 2020-10-16 VITALS
TEMPERATURE: 99 F | SYSTOLIC BLOOD PRESSURE: 121 MMHG | BODY MASS INDEX: 31.96 KG/M2 | WEIGHT: 195 LBS | DIASTOLIC BLOOD PRESSURE: 65 MMHG | HEART RATE: 86 BPM

## 2020-10-16 DIAGNOSIS — Z34.03 ENCOUNTER FOR PRENATAL CARE IN THIRD TRIMESTER OF FIRST PREGNANCY: Primary | ICD-10-CM

## 2020-10-16 PROCEDURE — 99207 PR PRENATAL VISIT: CPT | Performed by: OBSTETRICS & GYNECOLOGY

## 2020-10-16 NOTE — NURSING NOTE
"Initial /65 (BP Location: Right arm, Patient Position: Chair, Cuff Size: Adult Regular)   Pulse 86   Temp 99  F (37.2  C) (Tympanic)   Wt 88.5 kg (195 lb)   LMP 02/21/2020 (Exact Date)   Breastfeeding No   BMI 31.96 kg/m   Estimated body mass index is 31.96 kg/m  as calculated from the following:    Height as of 10/2/20: 1.664 m (5' 5.5\").    Weight as of this encounter: 88.5 kg (195 lb). .    Anju Finnegan MA    "

## 2020-10-16 NOTE — PROGRESS NOTES
[facility]    Return OB Note    CC: Return OB     Subjective:  Indigo is a 30 year old  at 34w0d   Denies vaginal bleeding, LOF, or regular contractions. Good fetal movement.  Complaints today: None    Objective:  /65 (BP Location: Right arm, Patient Position: Chair, Cuff Size: Adult Regular)   Pulse 86   Temp 99  F (37.2  C) (Tympanic)   Wt 88.5 kg (195 lb)   LMP 2020 (Exact Date)   Breastfeeding No   BMI 31.96 kg/m      Fundal height: 33cm  FHT: 150bpm      Assessment/Plan:   Encounter Diagnosis   Name Primary?     Encounter for prenatal care in third trimester of first pregnancy Yes       IUP at 34w0d  Rh negative  GBS next visit  Strict return precautions given    RTC 2 weeks    Jessica Thacker DO

## 2020-10-29 ENCOUNTER — PRENATAL OFFICE VISIT (OUTPATIENT)
Dept: OBGYN | Facility: CLINIC | Age: 30
End: 2020-10-29
Payer: COMMERCIAL

## 2020-10-29 VITALS
RESPIRATION RATE: 16 BRPM | DIASTOLIC BLOOD PRESSURE: 70 MMHG | HEIGHT: 66 IN | WEIGHT: 197.7 LBS | HEART RATE: 90 BPM | TEMPERATURE: 97.9 F | SYSTOLIC BLOOD PRESSURE: 118 MMHG | BODY MASS INDEX: 31.77 KG/M2

## 2020-10-29 DIAGNOSIS — Z34.03 ENCOUNTER FOR PRENATAL CARE IN THIRD TRIMESTER OF FIRST PREGNANCY: Primary | ICD-10-CM

## 2020-10-29 DIAGNOSIS — Z34.03 ENCOUNTER FOR SUPERVISION OF NORMAL FIRST PREGNANCY IN THIRD TRIMESTER: ICD-10-CM

## 2020-10-29 PROCEDURE — 99207 PR PRENATAL VISIT: CPT | Performed by: OBSTETRICS & GYNECOLOGY

## 2020-10-29 PROCEDURE — 87653 STREP B DNA AMP PROBE: CPT | Performed by: OBSTETRICS & GYNECOLOGY

## 2020-10-29 ASSESSMENT — MIFFLIN-ST. JEOR: SCORE: 1635.57

## 2020-10-29 NOTE — PROGRESS NOTES
Concerns:   Doing well.  No concerns today.  No vaginal bleeding, LOF.  No contractions.  Reportable signs and symptoms discussed.  Discussed kick counts and fetal movement.  Discussed PTL, PROM, and when to call or come in.  RTC 2 weeks.    Nasim Rodriguez MD      Concerns:   Doing well.  No concerns today.  No vaginal bleeding, LOF.  Cephalic position confirmed by Leopold maneuvers and cervical exam.  Reportable signs and symptoms discussed.  Discussed kick counts and fetal movement.  Discussed PTL, PROM, and when to call or come in.  RTC 1 weeks.  GBS cx obtained    Nasim Rodriguez MD

## 2020-10-30 LAB
GP B STREP DNA SPEC QL NAA+PROBE: NEGATIVE
SPECIMEN SOURCE: NORMAL

## 2020-11-06 ENCOUNTER — PRENATAL OFFICE VISIT (OUTPATIENT)
Dept: OBGYN | Facility: CLINIC | Age: 30
End: 2020-11-06
Payer: COMMERCIAL

## 2020-11-06 VITALS
HEART RATE: 96 BPM | BODY MASS INDEX: 32.13 KG/M2 | DIASTOLIC BLOOD PRESSURE: 72 MMHG | RESPIRATION RATE: 16 BRPM | TEMPERATURE: 98.5 F | SYSTOLIC BLOOD PRESSURE: 118 MMHG | HEIGHT: 66 IN | WEIGHT: 199.9 LBS

## 2020-11-06 DIAGNOSIS — Z34.03 ENCOUNTER FOR PRENATAL CARE IN THIRD TRIMESTER OF FIRST PREGNANCY: Primary | ICD-10-CM

## 2020-11-06 PROCEDURE — 99207 PR PRENATAL VISIT: CPT | Performed by: OBSTETRICS & GYNECOLOGY

## 2020-11-06 ASSESSMENT — MIFFLIN-ST. JEOR: SCORE: 1645.55

## 2020-11-06 NOTE — PROGRESS NOTES
"CC: Here for routine prenatal visit @ 37w0d   HPI:  Getting a lot of BHC; now off work in order to self-quarantine prior to labor    PE: /72 (BP Location: Right arm, Cuff Size: Adult Regular)   Pulse 96   Temp 98.5  F (36.9  C)   Resp 16   Ht 1.664 m (5' 5.5\")   Wt 90.7 kg (199 lb 14.4 oz)   LMP 02/21/2020 (Exact Date)   BMI 32.76 kg/m     See OB flowsheet      A:  1. Encounter for prenatal care in third trimester of first pregnancy        Routine prenatal care  RTC 1 weeks.      Gissel Kinsey M.D.     "

## 2020-11-13 ENCOUNTER — PRENATAL OFFICE VISIT (OUTPATIENT)
Dept: OBGYN | Facility: CLINIC | Age: 30
End: 2020-11-13
Payer: COMMERCIAL

## 2020-11-13 VITALS
RESPIRATION RATE: 16 BRPM | HEART RATE: 90 BPM | HEIGHT: 66 IN | WEIGHT: 199.6 LBS | BODY MASS INDEX: 32.08 KG/M2 | DIASTOLIC BLOOD PRESSURE: 68 MMHG | SYSTOLIC BLOOD PRESSURE: 115 MMHG | TEMPERATURE: 98.1 F

## 2020-11-13 DIAGNOSIS — Z34.03 ENCOUNTER FOR PRENATAL CARE IN THIRD TRIMESTER OF FIRST PREGNANCY: Primary | ICD-10-CM

## 2020-11-13 PROCEDURE — 99207 PR PRENATAL VISIT: CPT | Performed by: OBSTETRICS & GYNECOLOGY

## 2020-11-13 ASSESSMENT — MIFFLIN-ST. JEOR: SCORE: 1644.19

## 2020-11-13 NOTE — NURSING NOTE
"Initial /68 (BP Location: Right arm, Patient Position: Chair, Cuff Size: Adult Regular)   Pulse 90   Temp 98.1  F (36.7  C) (Tympanic)   Resp 16   Ht 1.664 m (5' 5.5\")   Wt 90.5 kg (199 lb 9.6 oz)   LMP 02/21/2020 (Exact Date)   Breastfeeding No   BMI 32.71 kg/m   Estimated body mass index is 32.71 kg/m  as calculated from the following:    Height as of this encounter: 1.664 m (5' 5.5\").    Weight as of this encounter: 90.5 kg (199 lb 9.6 oz). .    Amy Almonte, [facility]    "

## 2020-11-13 NOTE — PROGRESS NOTES
"CC: Here for routine prenatal visit @ 38w0d   HPI: + FM, no ctx, no LOF, no VB.  No complaints.     PE: /68 (BP Location: Right arm, Patient Position: Chair, Cuff Size: Adult Regular)   Pulse 90   Temp 98.1  F (36.7  C) (Tympanic)   Resp 16   Ht 1.664 m (5' 5.5\")   Wt 90.5 kg (199 lb 9.6 oz)   LMP 02/21/2020 (Exact Date)   Breastfeeding No   BMI 32.71 kg/m     See OB flowsheet    GBS negative    A/P G1 @ 38w0d normal pregnancy    1. Routine prenatal care.  COVID restrictions and recommendations reviewed including iron supplementation.     RTC 1 week    Evelin Fonseca M.D.    "

## 2020-11-20 ENCOUNTER — PRENATAL OFFICE VISIT (OUTPATIENT)
Dept: OBGYN | Facility: CLINIC | Age: 30
End: 2020-11-20
Payer: COMMERCIAL

## 2020-11-20 VITALS
TEMPERATURE: 97.9 F | DIASTOLIC BLOOD PRESSURE: 66 MMHG | RESPIRATION RATE: 18 BRPM | HEIGHT: 66 IN | BODY MASS INDEX: 32.4 KG/M2 | SYSTOLIC BLOOD PRESSURE: 114 MMHG | WEIGHT: 201.6 LBS | HEART RATE: 84 BPM

## 2020-11-20 DIAGNOSIS — Z34.03 ENCOUNTER FOR PRENATAL CARE IN THIRD TRIMESTER OF FIRST PREGNANCY: Primary | ICD-10-CM

## 2020-11-20 PROCEDURE — 99207 PR PRENATAL VISIT: CPT | Performed by: OBSTETRICS & GYNECOLOGY

## 2020-11-20 ASSESSMENT — MIFFLIN-ST. JEOR: SCORE: 1653.26

## 2020-11-20 NOTE — PROGRESS NOTES
"CC: Here for routine prenatal visit @ 39w0d   HPI: + FM, no ctx, no LOF, no VB.  No complaints.     PE: /66 (BP Location: Right arm, Patient Position: Chair, Cuff Size: Adult Regular)   Pulse 84   Temp 97.9  F (36.6  C) (Tympanic)   Resp 18   Ht 1.664 m (5' 5.5\")   Wt 91.4 kg (201 lb 9.6 oz)   LMP 02/21/2020 (Exact Date)   Breastfeeding No   BMI 33.04 kg/m     See OB flowsheet    SVE no change     A/P G1 @ 39w0d normal pregnancy    1. Routine prenatal care.  Labor precautions and COVID restrictions and recommendations reviewed including iron supplementation.      RTC 1 week    Evelin Fonseca M.D.    "

## 2020-11-20 NOTE — NURSING NOTE
"Initial /66 (BP Location: Right arm, Patient Position: Chair, Cuff Size: Adult Regular)   Pulse 84   Temp 97.9  F (36.6  C) (Tympanic)   Resp 18   Ht 1.664 m (5' 5.5\")   Wt 91.4 kg (201 lb 9.6 oz)   LMP 02/21/2020 (Exact Date)   Breastfeeding No   BMI 33.04 kg/m   Estimated body mass index is 33.04 kg/m  as calculated from the following:    Height as of this encounter: 1.664 m (5' 5.5\").    Weight as of this encounter: 91.4 kg (201 lb 9.6 oz). .    Amy Almonte, [facility] [address]    "

## 2020-11-25 PROBLEM — Z67.91 RH NEGATIVE STATE IN ANTEPARTUM PERIOD: Status: ACTIVE | Noted: 2020-11-25

## 2020-11-25 PROBLEM — O26.899 RH NEGATIVE STATE IN ANTEPARTUM PERIOD: Status: ACTIVE | Noted: 2020-11-25

## 2020-11-26 NOTE — PROGRESS NOTES
"[facility]    Return OB Note    CC: Return OB     Subjective:  Indigo is a 30 year old  at 39w5d   Denies vaginal bleeding, LOF, or regular contractions. Good fetal movement.  Complaints today: None    Objective:  /77 (BP Location: Left arm, Patient Position: Sitting, Cuff Size: Adult Large)   Pulse 84   Temp 97.1  F (36.2  C) (Tympanic)   Resp 12   Ht 1.664 m (5' 5.5\")   Wt 92.2 kg (203 lb 3.2 oz)   LMP 2020 (Exact Date)   BMI 33.30 kg/m      Fundal height: 36cm  FHT: 130bpm  SVE: 1+/30/-2    Assessment/Plan:   Encounter Diagnoses   Name Primary?     Encounter for prenatal care in third trimester of first pregnancy Yes     Rh negative state in antepartum period      Post-term pregnancy, 40-42 weeks of gestation        IUP at 40w0d  -Rh neg: s/p Rhogam  -Post dates: discussed IOL if no spontaneous labor, scheduled for 12/3 PM. Considering coming for membrane stripping next week (declined today). BPP with growth US (measuring small) for early next week.   -Strict return precautions given    RTC post partum for if wanted for membrane stripping    Jessica Thacker,     " 2 = assistive person

## 2020-11-27 ENCOUNTER — PRENATAL OFFICE VISIT (OUTPATIENT)
Dept: OBGYN | Facility: CLINIC | Age: 30
End: 2020-11-27
Payer: COMMERCIAL

## 2020-11-27 VITALS
RESPIRATION RATE: 12 BRPM | SYSTOLIC BLOOD PRESSURE: 116 MMHG | HEART RATE: 84 BPM | WEIGHT: 203.2 LBS | HEIGHT: 66 IN | DIASTOLIC BLOOD PRESSURE: 77 MMHG | BODY MASS INDEX: 32.66 KG/M2 | TEMPERATURE: 97.1 F

## 2020-11-27 DIAGNOSIS — O48.0 POST-TERM PREGNANCY, 40-42 WEEKS OF GESTATION: ICD-10-CM

## 2020-11-27 DIAGNOSIS — Z67.91 RH NEGATIVE STATE IN ANTEPARTUM PERIOD: ICD-10-CM

## 2020-11-27 DIAGNOSIS — Z34.03 ENCOUNTER FOR PRENATAL CARE IN THIRD TRIMESTER OF FIRST PREGNANCY: Primary | ICD-10-CM

## 2020-11-27 DIAGNOSIS — Z34.93 PRENATAL CARE IN THIRD TRIMESTER: ICD-10-CM

## 2020-11-27 DIAGNOSIS — O26.899 RH NEGATIVE STATE IN ANTEPARTUM PERIOD: ICD-10-CM

## 2020-11-27 PROCEDURE — 99207 PR PRENATAL VISIT: CPT | Performed by: OBSTETRICS & GYNECOLOGY

## 2020-11-27 RX ORDER — SODIUM CHLORIDE, SODIUM LACTATE, POTASSIUM CHLORIDE, CALCIUM CHLORIDE 600; 310; 30; 20 MG/100ML; MG/100ML; MG/100ML; MG/100ML
INJECTION, SOLUTION INTRAVENOUS CONTINUOUS
Status: DISCONTINUED | OUTPATIENT
Start: 2020-11-27 | End: 2020-12-06

## 2020-11-27 RX ORDER — LIDOCAINE 40 MG/G
CREAM TOPICAL
Status: DISCONTINUED | OUTPATIENT
Start: 2020-11-27 | End: 2020-12-06

## 2020-11-27 RX ORDER — TRANEXAMIC ACID 10 MG/ML
1 INJECTION, SOLUTION INTRAVENOUS EVERY 30 MIN PRN
Status: DISCONTINUED | OUTPATIENT
Start: 2020-11-27 | End: 2020-12-06

## 2020-11-27 RX ORDER — OXYTOCIN/0.9 % SODIUM CHLORIDE 30/500 ML
100-340 PLASTIC BAG, INJECTION (ML) INTRAVENOUS CONTINUOUS PRN
Status: DISCONTINUED | OUTPATIENT
Start: 2020-11-27 | End: 2020-12-06

## 2020-11-27 RX ORDER — OXYCODONE AND ACETAMINOPHEN 5; 325 MG/1; MG/1
1 TABLET ORAL
Status: DISCONTINUED | OUTPATIENT
Start: 2020-11-27 | End: 2020-12-06

## 2020-11-27 RX ORDER — CARBOPROST TROMETHAMINE 250 UG/ML
250 INJECTION, SOLUTION INTRAMUSCULAR
Status: DISCONTINUED | OUTPATIENT
Start: 2020-11-27 | End: 2020-12-06

## 2020-11-27 RX ORDER — NALOXONE HYDROCHLORIDE 0.4 MG/ML
.1-.4 INJECTION, SOLUTION INTRAMUSCULAR; INTRAVENOUS; SUBCUTANEOUS
Status: DISCONTINUED | OUTPATIENT
Start: 2020-11-27 | End: 2020-12-06

## 2020-11-27 RX ORDER — ACETAMINOPHEN 325 MG/1
650 TABLET ORAL EVERY 4 HOURS PRN
Status: DISCONTINUED | OUTPATIENT
Start: 2020-11-27 | End: 2020-12-06

## 2020-11-27 RX ORDER — METHYLERGONOVINE MALEATE 0.2 MG/ML
200 INJECTION INTRAVENOUS
Status: DISCONTINUED | OUTPATIENT
Start: 2020-11-27 | End: 2020-12-06

## 2020-11-27 RX ORDER — TERBUTALINE SULFATE 1 MG/ML
0.25 INJECTION, SOLUTION SUBCUTANEOUS
Status: DISCONTINUED | OUTPATIENT
Start: 2020-11-27 | End: 2020-12-06

## 2020-11-27 RX ORDER — OXYTOCIN 10 [USP'U]/ML
10 INJECTION, SOLUTION INTRAMUSCULAR; INTRAVENOUS
Status: DISCONTINUED | OUTPATIENT
Start: 2020-11-27 | End: 2020-12-06

## 2020-11-27 RX ORDER — MISOPROSTOL 100 UG/1
25 TABLET ORAL
Status: DISCONTINUED | OUTPATIENT
Start: 2020-11-27 | End: 2020-12-06

## 2020-11-27 RX ORDER — ONDANSETRON 2 MG/ML
4 INJECTION INTRAMUSCULAR; INTRAVENOUS EVERY 6 HOURS PRN
Status: DISCONTINUED | OUTPATIENT
Start: 2020-11-27 | End: 2020-12-06

## 2020-11-27 RX ORDER — IBUPROFEN 400 MG/1
800 TABLET, FILM COATED ORAL
Status: DISCONTINUED | OUTPATIENT
Start: 2020-11-27 | End: 2020-12-06

## 2020-11-27 ASSESSMENT — MIFFLIN-ST. JEOR: SCORE: 1660.52

## 2020-11-27 NOTE — NURSING NOTE
"Initial /77 (BP Location: Left arm, Patient Position: Sitting, Cuff Size: Adult Large)   Pulse 84   Temp 97.1  F (36.2  C) (Tympanic)   Resp 12   Ht 1.664 m (5' 5.5\")   Wt 92.2 kg (203 lb 3.2 oz)   LMP 02/21/2020 (Exact Date)   BMI 33.30 kg/m   Estimated body mass index is 33.3 kg/m  as calculated from the following:    Height as of this encounter: 1.664 m (5' 5.5\").    Weight as of this encounter: 92.2 kg (203 lb 3.2 oz). .      "

## 2020-11-30 ENCOUNTER — HOSPITAL ENCOUNTER (OUTPATIENT)
Dept: ULTRASOUND IMAGING | Facility: CLINIC | Age: 30
Discharge: HOME | End: 2020-11-30
Attending: OBSTETRICS & GYNECOLOGY | Admitting: OBSTETRICS & GYNECOLOGY
Payer: COMMERCIAL

## 2020-11-30 DIAGNOSIS — O48.0 POST-TERM PREGNANCY, 40-42 WEEKS OF GESTATION: ICD-10-CM

## 2020-11-30 PROCEDURE — 76819 FETAL BIOPHYS PROFIL W/O NST: CPT

## 2020-12-01 ENCOUNTER — PRENATAL OFFICE VISIT (OUTPATIENT)
Dept: OBGYN | Facility: CLINIC | Age: 30
End: 2020-12-01
Payer: COMMERCIAL

## 2020-12-01 VITALS
WEIGHT: 200 LBS | HEART RATE: 90 BPM | DIASTOLIC BLOOD PRESSURE: 83 MMHG | BODY MASS INDEX: 33.32 KG/M2 | SYSTOLIC BLOOD PRESSURE: 127 MMHG | TEMPERATURE: 97.7 F | RESPIRATION RATE: 18 BRPM | HEIGHT: 65 IN

## 2020-12-01 DIAGNOSIS — Z34.93 PRENATAL CARE IN THIRD TRIMESTER: Primary | ICD-10-CM

## 2020-12-01 PROCEDURE — 99207 PR PRENATAL VISIT: CPT | Performed by: OBSTETRICS & GYNECOLOGY

## 2020-12-01 ASSESSMENT — MIFFLIN-ST. JEOR: SCORE: 1638.07

## 2020-12-01 NOTE — NURSING NOTE
"Initial /83 (BP Location: Right arm, Patient Position: Chair, Cuff Size: Adult Regular)   Pulse 90   Temp 97.7  F (36.5  C) (Tympanic)   Resp 18   Ht 1.651 m (5' 5\")   Wt 90.7 kg (200 lb)   LMP 02/21/2020 (Exact Date)   BMI 33.28 kg/m   Estimated body mass index is 33.28 kg/m  as calculated from the following:    Height as of this encounter: 1.651 m (5' 5\").    Weight as of this encounter: 90.7 kg (200 lb). .      "

## 2020-12-01 NOTE — PROGRESS NOTES
"CC: Here for routine prenatal visit @ 40w4d   HPI: + FM, no ctx, no LOF, no VB.  No complaints.     PE: /83 (BP Location: Right arm, Patient Position: Chair, Cuff Size: Adult Regular)   Pulse 90   Temp 97.7  F (36.5  C) (Tympanic)   Resp 18   Ht 1.651 m (5' 5\")   Wt 90.7 kg (200 lb)   LMP 02/21/2020 (Exact Date)   BMI 33.28 kg/m     See OB flowsheet    U/S BPP 8/8   SVE: cervix easily reached and membranes swept per request    A/P G1 @ 40w4d normal pregnancy    1. Routine prenatal care.  Labor precautions reviewed.  COVID restrictions and recommendations reviewed including iron supplementation.  IOL planned for 12/4    RTC 6 weeks.      Evelin Fonseca M.D.    "

## 2020-12-03 ENCOUNTER — TELEPHONE (OUTPATIENT)
Dept: OBGYN | Facility: CLINIC | Age: 30
End: 2020-12-03

## 2020-12-03 ENCOUNTER — PRENATAL OFFICE VISIT (OUTPATIENT)
Dept: OBGYN | Facility: CLINIC | Age: 30
End: 2020-12-03
Payer: COMMERCIAL

## 2020-12-03 ENCOUNTER — HOSPITAL ENCOUNTER (INPATIENT)
Facility: CLINIC | Age: 30
LOS: 3 days | Discharge: HOME | End: 2020-12-06
Attending: OBSTETRICS & GYNECOLOGY | Admitting: OBSTETRICS & GYNECOLOGY
Payer: COMMERCIAL

## 2020-12-03 VITALS
RESPIRATION RATE: 16 BRPM | TEMPERATURE: 98.2 F | SYSTOLIC BLOOD PRESSURE: 113 MMHG | WEIGHT: 202 LBS | HEART RATE: 92 BPM | BODY MASS INDEX: 33.66 KG/M2 | DIASTOLIC BLOOD PRESSURE: 70 MMHG | HEIGHT: 65 IN

## 2020-12-03 DIAGNOSIS — N39.0 RECURRENT UTI: ICD-10-CM

## 2020-12-03 DIAGNOSIS — Z67.91 RH NEGATIVE STATE IN ANTEPARTUM PERIOD: Primary | ICD-10-CM

## 2020-12-03 DIAGNOSIS — O26.899 RH NEGATIVE STATE IN ANTEPARTUM PERIOD: Primary | ICD-10-CM

## 2020-12-03 DIAGNOSIS — Z34.93 PRENATAL CARE IN THIRD TRIMESTER: Primary | ICD-10-CM

## 2020-12-03 PROBLEM — Z34.90 PREGNANCY: Status: ACTIVE | Noted: 2020-12-03

## 2020-12-03 LAB
ABO + RH BLD: NORMAL
ABO + RH BLD: NORMAL
BASOPHILS # BLD AUTO: 0 10E9/L (ref 0–0.2)
BASOPHILS NFR BLD AUTO: 0.2 %
DIFFERENTIAL METHOD BLD: ABNORMAL
EOSINOPHIL # BLD AUTO: 0.1 10E9/L (ref 0–0.7)
EOSINOPHIL NFR BLD AUTO: 1.2 %
ERYTHROCYTE [DISTWIDTH] IN BLOOD BY AUTOMATED COUNT: 13.5 % (ref 10–15)
HCT VFR BLD AUTO: 36.1 % (ref 35–47)
HGB BLD-MCNC: 12 G/DL (ref 11.7–15.7)
IMM GRANULOCYTES # BLD: 0.1 10E9/L (ref 0–0.4)
IMM GRANULOCYTES NFR BLD: 0.8 %
LABORATORY COMMENT REPORT: NORMAL
LYMPHOCYTES # BLD AUTO: 2.1 10E9/L (ref 0.8–5.3)
LYMPHOCYTES NFR BLD AUTO: 18.2 %
MCH RBC QN AUTO: 29.4 PG (ref 26.5–33)
MCHC RBC AUTO-ENTMCNC: 33.2 G/DL (ref 31.5–36.5)
MCV RBC AUTO: 89 FL (ref 78–100)
MONOCYTES # BLD AUTO: 0.7 10E9/L (ref 0–1.3)
MONOCYTES NFR BLD AUTO: 5.6 %
NEUTROPHILS # BLD AUTO: 8.6 10E9/L (ref 1.6–8.3)
NEUTROPHILS NFR BLD AUTO: 74 %
NRBC # BLD AUTO: 0 10*3/UL
NRBC BLD AUTO-RTO: 0 /100
PLATELET # BLD AUTO: 203 10E9/L (ref 150–450)
RBC # BLD AUTO: 4.08 10E12/L (ref 3.8–5.2)
SARS-COV-2 RNA SPEC QL NAA+PROBE: NEGATIVE
SARS-COV-2 RNA SPEC QL NAA+PROBE: NORMAL
SPECIMEN EXP DATE BLD: NORMAL
SPECIMEN SOURCE: NORMAL
SPECIMEN SOURCE: NORMAL
WBC # BLD AUTO: 11.7 10E9/L (ref 4–11)

## 2020-12-03 PROCEDURE — 120N000001 HC R&B MED SURG/OB

## 2020-12-03 PROCEDURE — U0003 INFECTIOUS AGENT DETECTION BY NUCLEIC ACID (DNA OR RNA); SEVERE ACUTE RESPIRATORY SYNDROME CORONAVIRUS 2 (SARS-COV-2) (CORONAVIRUS DISEASE [COVID-19]), AMPLIFIED PROBE TECHNIQUE, MAKING USE OF HIGH THROUGHPUT TECHNOLOGIES AS DESCRIBED BY CMS-2020-01-R: HCPCS | Performed by: OBSTETRICS & GYNECOLOGY

## 2020-12-03 PROCEDURE — 86780 TREPONEMA PALLIDUM: CPT | Performed by: OBSTETRICS & GYNECOLOGY

## 2020-12-03 PROCEDURE — 86900 BLOOD TYPING SEROLOGIC ABO: CPT | Performed by: OBSTETRICS & GYNECOLOGY

## 2020-12-03 PROCEDURE — 86901 BLOOD TYPING SEROLOGIC RH(D): CPT | Performed by: OBSTETRICS & GYNECOLOGY

## 2020-12-03 PROCEDURE — 85025 COMPLETE CBC W/AUTO DIFF WBC: CPT | Performed by: OBSTETRICS & GYNECOLOGY

## 2020-12-03 PROCEDURE — 99207 PR PRENATAL VISIT: CPT | Performed by: OBSTETRICS & GYNECOLOGY

## 2020-12-03 RX ORDER — CITRIC ACID/SODIUM CITRATE 334-500MG
30 SOLUTION, ORAL ORAL ONCE
Status: DISCONTINUED | OUTPATIENT
Start: 2020-12-03 | End: 2020-12-04

## 2020-12-03 RX ORDER — OXYTOCIN/0.9 % SODIUM CHLORIDE 30/500 ML
100-340 PLASTIC BAG, INJECTION (ML) INTRAVENOUS CONTINUOUS PRN
Status: COMPLETED | OUTPATIENT
Start: 2020-12-03 | End: 2020-12-04

## 2020-12-03 RX ORDER — TRANEXAMIC ACID 10 MG/ML
1 INJECTION, SOLUTION INTRAVENOUS EVERY 30 MIN PRN
Status: DISCONTINUED | OUTPATIENT
Start: 2020-12-03 | End: 2020-12-04

## 2020-12-03 RX ORDER — FENTANYL CITRATE 50 UG/ML
50-100 INJECTION, SOLUTION INTRAMUSCULAR; INTRAVENOUS
Status: DISCONTINUED | OUTPATIENT
Start: 2020-12-03 | End: 2020-12-04

## 2020-12-03 RX ORDER — METHYLERGONOVINE MALEATE 0.2 MG/ML
200 INJECTION INTRAVENOUS
Status: DISCONTINUED | OUTPATIENT
Start: 2020-12-03 | End: 2020-12-04

## 2020-12-03 RX ORDER — CARBOPROST TROMETHAMINE 250 UG/ML
250 INJECTION, SOLUTION INTRAMUSCULAR
Status: DISCONTINUED | OUTPATIENT
Start: 2020-12-03 | End: 2020-12-04

## 2020-12-03 RX ORDER — NALOXONE HYDROCHLORIDE 0.4 MG/ML
0.4 INJECTION, SOLUTION INTRAMUSCULAR; INTRAVENOUS; SUBCUTANEOUS
Status: DISCONTINUED | OUTPATIENT
Start: 2020-12-03 | End: 2020-12-04

## 2020-12-03 RX ORDER — OXYCODONE AND ACETAMINOPHEN 5; 325 MG/1; MG/1
1 TABLET ORAL
Status: DISCONTINUED | OUTPATIENT
Start: 2020-12-03 | End: 2020-12-04

## 2020-12-03 RX ORDER — SODIUM CHLORIDE, SODIUM LACTATE, POTASSIUM CHLORIDE, CALCIUM CHLORIDE 600; 310; 30; 20 MG/100ML; MG/100ML; MG/100ML; MG/100ML
INJECTION, SOLUTION INTRAVENOUS CONTINUOUS
Status: DISCONTINUED | OUTPATIENT
Start: 2020-12-03 | End: 2020-12-04

## 2020-12-03 RX ORDER — OXYTOCIN 10 [USP'U]/ML
10 INJECTION, SOLUTION INTRAMUSCULAR; INTRAVENOUS
Status: DISCONTINUED | OUTPATIENT
Start: 2020-12-03 | End: 2020-12-04

## 2020-12-03 RX ORDER — LIDOCAINE 40 MG/G
CREAM TOPICAL
Status: DISCONTINUED | OUTPATIENT
Start: 2020-12-03 | End: 2020-12-04

## 2020-12-03 RX ORDER — ACETAMINOPHEN 325 MG/1
650 TABLET ORAL EVERY 4 HOURS PRN
Status: DISCONTINUED | OUTPATIENT
Start: 2020-12-03 | End: 2020-12-04

## 2020-12-03 RX ORDER — IBUPROFEN 800 MG/1
800 TABLET, FILM COATED ORAL
Status: DISCONTINUED | OUTPATIENT
Start: 2020-12-03 | End: 2020-12-04

## 2020-12-03 RX ORDER — NALOXONE HYDROCHLORIDE 0.4 MG/ML
0.2 INJECTION, SOLUTION INTRAMUSCULAR; INTRAVENOUS; SUBCUTANEOUS
Status: DISCONTINUED | OUTPATIENT
Start: 2020-12-03 | End: 2020-12-04

## 2020-12-03 RX ORDER — OXYTOCIN/0.9 % SODIUM CHLORIDE 30/500 ML
1-24 PLASTIC BAG, INJECTION (ML) INTRAVENOUS CONTINUOUS
Status: DISCONTINUED | OUTPATIENT
Start: 2020-12-03 | End: 2020-12-04

## 2020-12-03 RX ORDER — ONDANSETRON 2 MG/ML
4 INJECTION INTRAMUSCULAR; INTRAVENOUS EVERY 6 HOURS PRN
Status: DISCONTINUED | OUTPATIENT
Start: 2020-12-03 | End: 2020-12-04

## 2020-12-03 ASSESSMENT — MIFFLIN-ST. JEOR
SCORE: 1642.61
SCORE: 1647.15

## 2020-12-03 NOTE — PROGRESS NOTES
"CC: Here for routine prenatal visit @ 40w6d   HPI: + FM, no ctx, no LOF, no VB.  No complaints.     PE: /70 (BP Location: Right arm, Patient Position: Chair, Cuff Size: Adult Large)   Pulse 92   Temp 98.2  F (36.8  C) (Tympanic)   Resp 16   Ht 1.651 m (5' 5\")   Wt 91.6 kg (202 lb)   LMP 02/21/2020 (Exact Date)   Breastfeeding No   BMI 33.61 kg/m     See OB flowsheet    NST reactive    A/P G1 @ 40w6d normal pregnancy, post- term pregnancy    1. Routine prenatal care.  Scheduled for IOL tomorrow for post-term (reactive NST).  COVID restrictions and recommendations reviewed including iron supplementation.  COVID test ordered for IOL.     RTC 1 week    Evelin Fonseca M.D.    "

## 2020-12-03 NOTE — NURSING NOTE
"Initial /70 (BP Location: Right arm, Patient Position: Chair, Cuff Size: Adult Large)   Pulse 92   Temp 98.2  F (36.8  C) (Tympanic)   Resp 16   Ht 1.651 m (5' 5\")   Wt 91.6 kg (202 lb)   LMP 02/21/2020 (Exact Date)   Breastfeeding No   BMI 33.61 kg/m   Estimated body mass index is 33.61 kg/m  as calculated from the following:    Height as of this encounter: 1.651 m (5' 5\").    Weight as of this encounter: 91.6 kg (202 lb). .    Amy Almonte, [facility]    "

## 2020-12-04 ENCOUNTER — ANESTHESIA EVENT (OUTPATIENT)
Dept: OBGYN | Facility: CLINIC | Age: 30
End: 2020-12-04
Payer: COMMERCIAL

## 2020-12-04 ENCOUNTER — ANESTHESIA (OUTPATIENT)
Dept: OBGYN | Facility: CLINIC | Age: 30
End: 2020-12-04
Payer: COMMERCIAL

## 2020-12-04 LAB — T PALLIDUM AB SER QL: NONREACTIVE

## 2020-12-04 PROCEDURE — 59400 OBSTETRICAL CARE: CPT | Performed by: OBSTETRICS & GYNECOLOGY

## 2020-12-04 PROCEDURE — 0KQM0ZZ REPAIR PERINEUM MUSCLE, OPEN APPROACH: ICD-10-PCS | Performed by: OBSTETRICS & GYNECOLOGY

## 2020-12-04 PROCEDURE — 250N000009 HC RX 250: Performed by: OBSTETRICS & GYNECOLOGY

## 2020-12-04 PROCEDURE — 250N000009 HC RX 250

## 2020-12-04 PROCEDURE — 999N000011 HC STATISTIC ANESTHESIA CASE

## 2020-12-04 PROCEDURE — 258N000003 HC RX IP 258 OP 636: Performed by: NURSE ANESTHETIST, CERTIFIED REGISTERED

## 2020-12-04 PROCEDURE — 722N000001 HC LABOR CARE VAGINAL DELIVERY SINGLE

## 2020-12-04 PROCEDURE — 250N000011 HC RX IP 250 OP 636: Performed by: NURSE ANESTHETIST, CERTIFIED REGISTERED

## 2020-12-04 PROCEDURE — 250N000009 HC RX 250: Performed by: NURSE ANESTHETIST, CERTIFIED REGISTERED

## 2020-12-04 PROCEDURE — 258N000003 HC RX IP 258 OP 636: Performed by: OBSTETRICS & GYNECOLOGY

## 2020-12-04 PROCEDURE — 250N000013 HC RX MED GY IP 250 OP 250 PS 637: Performed by: OBSTETRICS & GYNECOLOGY

## 2020-12-04 PROCEDURE — 3E0R3BZ INTRODUCTION OF ANESTHETIC AGENT INTO SPINAL CANAL, PERCUTANEOUS APPROACH: ICD-10-PCS | Performed by: NURSE ANESTHETIST, CERTIFIED REGISTERED

## 2020-12-04 PROCEDURE — 00HU33Z INSERTION OF INFUSION DEVICE INTO SPINAL CANAL, PERCUTANEOUS APPROACH: ICD-10-PCS | Performed by: NURSE ANESTHETIST, CERTIFIED REGISTERED

## 2020-12-04 PROCEDURE — 120N000001 HC R&B MED SURG/OB

## 2020-12-04 PROCEDURE — 370N000003 HC ANESTHESIA WARD SERVICE: Performed by: NURSE ANESTHETIST, CERTIFIED REGISTERED

## 2020-12-04 RX ORDER — NALBUPHINE HYDROCHLORIDE 10 MG/ML
2.5-5 INJECTION, SOLUTION INTRAMUSCULAR; INTRAVENOUS; SUBCUTANEOUS EVERY 6 HOURS PRN
Status: DISCONTINUED | OUTPATIENT
Start: 2020-12-04 | End: 2020-12-04 | Stop reason: RX

## 2020-12-04 RX ORDER — ACETAMINOPHEN 325 MG/1
650 TABLET ORAL EVERY 4 HOURS PRN
Status: DISCONTINUED | OUTPATIENT
Start: 2020-12-04 | End: 2020-12-07 | Stop reason: HOSPADM

## 2020-12-04 RX ORDER — IBUPROFEN 800 MG/1
800 TABLET, FILM COATED ORAL EVERY 6 HOURS PRN
Status: DISCONTINUED | OUTPATIENT
Start: 2020-12-04 | End: 2020-12-07 | Stop reason: HOSPADM

## 2020-12-04 RX ORDER — AMOXICILLIN 250 MG
2 CAPSULE ORAL 2 TIMES DAILY
Status: DISCONTINUED | OUTPATIENT
Start: 2020-12-04 | End: 2020-12-07 | Stop reason: HOSPADM

## 2020-12-04 RX ORDER — EPHEDRINE SULFATE 50 MG/ML
INJECTION, SOLUTION INTRAMUSCULAR; INTRAVENOUS; SUBCUTANEOUS
Status: COMPLETED
Start: 2020-12-04 | End: 2020-12-04

## 2020-12-04 RX ORDER — LIDOCAINE 40 MG/G
CREAM TOPICAL
Status: DISCONTINUED | OUTPATIENT
Start: 2020-12-04 | End: 2020-12-04

## 2020-12-04 RX ORDER — NALOXONE HYDROCHLORIDE 0.4 MG/ML
0.2 INJECTION, SOLUTION INTRAMUSCULAR; INTRAVENOUS; SUBCUTANEOUS
Status: DISCONTINUED | OUTPATIENT
Start: 2020-12-04 | End: 2020-12-04

## 2020-12-04 RX ORDER — NALOXONE HYDROCHLORIDE 0.4 MG/ML
0.4 INJECTION, SOLUTION INTRAMUSCULAR; INTRAVENOUS; SUBCUTANEOUS
Status: DISCONTINUED | OUTPATIENT
Start: 2020-12-04 | End: 2020-12-04

## 2020-12-04 RX ORDER — LIDOCAINE HYDROCHLORIDE 10 MG/ML
INJECTION, SOLUTION EPIDURAL; INFILTRATION; INTRACAUDAL; PERINEURAL
Status: DISCONTINUED
Start: 2020-12-04 | End: 2020-12-04 | Stop reason: HOSPADM

## 2020-12-04 RX ORDER — PRENATAL VIT/IRON FUM/FOLIC AC 27MG-0.8MG
1 TABLET ORAL DAILY
Status: DISCONTINUED | OUTPATIENT
Start: 2020-12-04 | End: 2020-12-07 | Stop reason: HOSPADM

## 2020-12-04 RX ORDER — EPHEDRINE SULFATE 50 MG/ML
5 INJECTION, SOLUTION INTRAMUSCULAR; INTRAVENOUS; SUBCUTANEOUS
Status: DISCONTINUED | OUTPATIENT
Start: 2020-12-04 | End: 2020-12-04

## 2020-12-04 RX ORDER — LIDOCAINE HYDROCHLORIDE AND EPINEPHRINE 15; 5 MG/ML; UG/ML
INJECTION, SOLUTION EPIDURAL PRN
Status: DISCONTINUED | OUTPATIENT
Start: 2020-12-04 | End: 2020-12-04

## 2020-12-04 RX ORDER — MISOPROSTOL 200 UG/1
400 TABLET ORAL
Status: DISCONTINUED | OUTPATIENT
Start: 2020-12-04 | End: 2020-12-07 | Stop reason: HOSPADM

## 2020-12-04 RX ORDER — OXYTOCIN/0.9 % SODIUM CHLORIDE 30/500 ML
100 PLASTIC BAG, INJECTION (ML) INTRAVENOUS CONTINUOUS
Status: DISCONTINUED | OUTPATIENT
Start: 2020-12-04 | End: 2020-12-07 | Stop reason: HOSPADM

## 2020-12-04 RX ORDER — TRANEXAMIC ACID 10 MG/ML
1 INJECTION, SOLUTION INTRAVENOUS EVERY 30 MIN PRN
Status: DISCONTINUED | OUTPATIENT
Start: 2020-12-04 | End: 2020-12-07 | Stop reason: HOSPADM

## 2020-12-04 RX ORDER — BISACODYL 10 MG
10 SUPPOSITORY, RECTAL RECTAL DAILY PRN
Status: DISCONTINUED | OUTPATIENT
Start: 2020-12-06 | End: 2020-12-07 | Stop reason: HOSPADM

## 2020-12-04 RX ORDER — OXYTOCIN/0.9 % SODIUM CHLORIDE 30/500 ML
340 PLASTIC BAG, INJECTION (ML) INTRAVENOUS CONTINUOUS PRN
Status: DISCONTINUED | OUTPATIENT
Start: 2020-12-04 | End: 2020-12-07 | Stop reason: HOSPADM

## 2020-12-04 RX ORDER — HYDROCORTISONE 2.5 %
CREAM (GRAM) TOPICAL 3 TIMES DAILY PRN
Status: DISCONTINUED | OUTPATIENT
Start: 2020-12-04 | End: 2020-12-07 | Stop reason: HOSPADM

## 2020-12-04 RX ORDER — LIDOCAINE HYDROCHLORIDE 10 MG/ML
INJECTION, SOLUTION INFILTRATION; PERINEURAL PRN
Status: DISCONTINUED | OUTPATIENT
Start: 2020-12-04 | End: 2020-12-04

## 2020-12-04 RX ORDER — MODIFIED LANOLIN
OINTMENT (GRAM) TOPICAL
Status: DISCONTINUED | OUTPATIENT
Start: 2020-12-04 | End: 2020-12-07 | Stop reason: HOSPADM

## 2020-12-04 RX ORDER — AMOXICILLIN 250 MG
1 CAPSULE ORAL 2 TIMES DAILY
Status: DISCONTINUED | OUTPATIENT
Start: 2020-12-04 | End: 2020-12-07 | Stop reason: HOSPADM

## 2020-12-04 RX ORDER — OXYTOCIN 10 [USP'U]/ML
10 INJECTION, SOLUTION INTRAMUSCULAR; INTRAVENOUS
Status: DISCONTINUED | OUTPATIENT
Start: 2020-12-04 | End: 2020-12-07 | Stop reason: HOSPADM

## 2020-12-04 RX ADMIN — SODIUM CHLORIDE, POTASSIUM CHLORIDE, SODIUM LACTATE AND CALCIUM CHLORIDE 1000 ML: 600; 310; 30; 20 INJECTION, SOLUTION INTRAVENOUS at 03:26

## 2020-12-04 RX ADMIN — PRENATAL VITAMINS-IRON FUMARATE 27 MG IRON-FOLIC ACID 0.8 MG TABLET 1 TABLET: at 21:16

## 2020-12-04 RX ADMIN — LIDOCAINE HYDROCHLORIDE 10 ML: 10 INJECTION, SOLUTION EPIDURAL; INFILTRATION; INTRACAUDAL; PERINEURAL at 16:41

## 2020-12-04 RX ADMIN — Medication: at 10:23

## 2020-12-04 RX ADMIN — SODIUM CHLORIDE, POTASSIUM CHLORIDE, SODIUM LACTATE AND CALCIUM CHLORIDE 250 ML: 600; 310; 30; 20 INJECTION, SOLUTION INTRAVENOUS at 15:53

## 2020-12-04 RX ADMIN — Medication: at 04:10

## 2020-12-04 RX ADMIN — SODIUM CHLORIDE, POTASSIUM CHLORIDE, SODIUM LACTATE AND CALCIUM CHLORIDE 500 ML: 600; 310; 30; 20 INJECTION, SOLUTION INTRAVENOUS at 14:42

## 2020-12-04 RX ADMIN — Medication 2 MILLI-UNITS/MIN: at 16:18

## 2020-12-04 RX ADMIN — EPHEDRINE SULFATE 5 MG: 50 INJECTION, SOLUTION INTRAMUSCULAR; INTRAVENOUS; SUBCUTANEOUS at 04:33

## 2020-12-04 RX ADMIN — Medication 8 ML: at 04:07

## 2020-12-04 RX ADMIN — IBUPROFEN 800 MG: 800 TABLET ORAL at 23:19

## 2020-12-04 RX ADMIN — ACETAMINOPHEN 650 MG: 325 TABLET, FILM COATED ORAL at 21:16

## 2020-12-04 RX ADMIN — Medication 5 MG: at 04:33

## 2020-12-04 RX ADMIN — LIDOCAINE HYDROCHLORIDE 100 MG: 10 INJECTION, SOLUTION INFILTRATION; PERINEURAL at 03:57

## 2020-12-04 RX ADMIN — LIDOCAINE HYDROCHLORIDE AND EPINEPHRINE 75 MG: 15; 5 INJECTION, SOLUTION EPIDURAL at 04:06

## 2020-12-04 RX ADMIN — SODIUM CHLORIDE, POTASSIUM CHLORIDE, SODIUM LACTATE AND CALCIUM CHLORIDE 250 ML: 600; 310; 30; 20 INJECTION, SOLUTION INTRAVENOUS at 16:09

## 2020-12-04 RX ADMIN — IBUPROFEN 800 MG: 800 TABLET ORAL at 17:21

## 2020-12-04 RX ADMIN — Medication 100 ML/HR: at 17:14

## 2020-12-04 RX ADMIN — Medication 340 ML/HR: at 16:35

## 2020-12-04 RX ADMIN — SODIUM CHLORIDE, POTASSIUM CHLORIDE, SODIUM LACTATE AND CALCIUM CHLORIDE: 600; 310; 30; 20 INJECTION, SOLUTION INTRAVENOUS at 04:30

## 2020-12-04 RX ADMIN — DOCUSATE SODIUM AND SENNOSIDES 1 TABLET: 8.6; 5 TABLET ORAL at 21:16

## 2020-12-04 RX ADMIN — SODIUM CHLORIDE, POTASSIUM CHLORIDE, SODIUM LACTATE AND CALCIUM CHLORIDE: 600; 310; 30; 20 INJECTION, SOLUTION INTRAVENOUS at 09:25

## 2020-12-04 RX ADMIN — Medication 2 MILLI-UNITS/MIN: at 05:24

## 2020-12-04 NOTE — PROGRESS NOTES
"Fetal heart tones assessed and Category 1 tracing present. Uterine activity assessed and normal uterine activity present. Interventions included side-lying release bilaterally, forward-leaning inversion, \"shake the apples\" and lunges. Pt into tub at this time.   "

## 2020-12-04 NOTE — PLAN OF CARE
Contractions spacing out during pushing. FHT category 2  with fetal tachycardia and recurrent late decelerations. Dr Fonseca at bedside and states to restart pictocin at 2mu/min.

## 2020-12-04 NOTE — PROGRESS NOTES
Dori Alvarez CRNA called and in room at 0345. Patient and procedure correctly identified/verified with CRNA. Time out completed. Consent signed. 1000cc fluid bolus given. Patient in position for epidural placement. Epidural placed without complications. Test dose/bolus given by CRNA and patient tolerated well. Patient rated her pain as 8/10 prior to epidural and after medication administration, pain level 0/10. Ephedrine was Given . Dermatomes were assessed.

## 2020-12-04 NOTE — PLAN OF CARE
S:Delivery  B:Augmented  Labor,41w0d    Lab Results   Component Value Date    GBS Negative 10/29/2020    with antibiotic treatment not indicated 4 hours prior to delivery.  A: Patient delivered   lac 2nd degree at 1631 with Dr. ANU Fonseca in attendance and baby placed on mother's abdomen for delayed cord clamping. Baby dried and stimulated. Baby placed  skin to skin @ 1633.. Apgars 8/9..  IV infusion of Oxytocin  infused. Placenta removal spontaneous. MD does not want placenta sent to pathology.  See Flowsheet for VS and PP checks. Delivery QBL (mL): 100 mL.  Labor care plan goals met, transition now to postpartum care.  R: Expect routine postpartum care. Anticipate first feeding within the hour or whenever infant displays feeding cues. Continue skin to skin. Prior discussion with mother indicates that feeding plan is Breast feeding . Educated mother on importance of exclusive breastfeeding, expected feeding readiness cues and encouraged her to observe for these cues while rooming in. Informed her that breastfeeding assistance would be provided.

## 2020-12-04 NOTE — PROGRESS NOTES
Pt admitted to room 2052 from home after SROM, large, clear fluid.  +fm, pt placed on EFM, vs and history completed. #18  IV started in left hand.  Covid test was negative in clinic form today.

## 2020-12-04 NOTE — ANESTHESIA PROCEDURE NOTES
Procedure note : epidural catheter      Staff -   CRNA: Dori Carroll APRN CRNA  Performed By: CRNA  Pre-Procedure    Location: OB    Procedure Times:12/4/2020 3:45 AM and 12/4/2020 4:24 AM  Pre-Anesthestic Checklist: patient identified, IV checked, risks and benefits discussed, informed consent, monitors and equipment checked, pre-op evaluation and at physician/surgeon's request    Timeout  Correct Patient: Yes   Correct Procedure: Yes   Correct Site: Yes   Correct Laterality: N/A   Correct Position: Yes   Site Marked: N/A   .   Procedure Documentation    .    Procedure: epidural catheter, .   Patient Position:sitting Insertion Site:L3-4  (midline approach) Injection technique: LORT saline   Local skin infiltrated with 10 mL of 1% lidocaine.      Patient Prep/Sterile Barriers; mask, sterile gloves, patient draped.  .  Needle: Touhy needle   Needle Gauge: 17.    Needle Length (Inches) 3.5   # of attempts: 1 and # of redirects:  .    Catheter: 19 G . .  Catheter threaded easily  .  .   .    Assessment/Narrative  Paresthesias: No.  .  .  Aspiration negative for heme or CSF  . Test dose of 5 mL at 04:06. Test dose negative for signs of intravascular, subdural or intrathecal injection. Sensory Level Left: T10  Sensory Level Right: T10  Comments:  VAS pain score prior to epidural:7    VAS pain score after epidural:1    Pt. Tolerated well, FHR stable.

## 2020-12-04 NOTE — TELEPHONE ENCOUNTER
D: Indigo MARTA Zainab 1990   40w6d   Patient called due to  fluid leaking .  Reports that she last saw  on 12/3/2020.  Last cervical exam:   A: Contractions: frequency 7-15 minutes, lasting 30 sec since 1400  Fluid leaking: clear   Vaginal Bleeding: brown tinged mucous  Fetal movement: active  Pain:Denies  R: Instructed patient to Come to [facility] [facility] for evaluation.  Instructed to call back with update if indicated or any concerns including increasing symptoms.  Discussed plan with patient and she agrees.

## 2020-12-04 NOTE — ANESTHESIA PREPROCEDURE EVALUATION
Anesthesia Pre-Procedure Evaluation    Patient: Indigo Lamb   MRN: [id number] : 1990          Preoperative Diagnosis: * No surgery found *        History reviewed. No pertinent past medical history.  Past Surgical History:   Procedure Laterality Date     ACL repair        HC TOOTH EXTRACTION W/FORCEP      wisdom teeth     SURGICAL HISTORY OF -       Bone-patellar-bone left knee anterior cruciate ligament reconstruction       Anesthesia Evaluation       history and physical reviewed .             ROS/MED HX    ENT/Pulmonary:  - neg pulmonary ROS     Neurologic:  - neg neurologic ROS     Cardiovascular:  - neg cardiovascular ROS       METS/Exercise Tolerance:     Hematologic:         Musculoskeletal:         GI/Hepatic:  - neg GI/hepatic ROS       Renal/Genitourinary:         Endo:         Psychiatric:         Infectious Disease:         Malignancy:         Other:                     neg OB ROS            Physical Exam  Normal systems: cardiovascular, pulmonary and dental    Airway   Mallampati: II  TM distance: > 3 FB  Neck ROM: full  Mouth opening: > 3 cm    Dental     Cardiovascular       Pulmonary             Lab Results   Component Value Date    WBC 11.7 (H) 2020    HGB 12.0 2020    HCT 36.1 2020     2020    SED 6 2005     2018    POTASSIUM 4.3 2018    CHLORIDE 107 2018    CO2 26 2018    BUN 14 2018    CR 0.82 2018    GLC 95 2018    KATY 8.9 2018    ALBUMIN 3.3 (L) 2018    PROTTOTAL 7.0 2018    ALT 19 2018    AST 15 2018    ALKPHOS 63 2018    BILITOTAL 0.5 2018    PTT 24 2011    INR 0.97 2011    TSH 1.83 2018    HCG Negative 2014       Preop Vitals  BP Readings from Last 3 Encounters:   20 120/69   20 113/70   20 127/83    Pulse Readings from Last 3 Encounters:   20 85   20 92   20 90      Resp Readings from  "Last 3 Encounters:   12/04/20 16   12/03/20 16   12/01/20 18    SpO2 Readings from Last 3 Encounters:   08/06/20 98%   07/01/20 99%   03/06/20 98%      Temp Readings from Last 1 Encounters:   12/04/20 37.1  C (98.7  F) (Oral)    Ht Readings from Last 1 Encounters:   12/03/20 1.651 m (5' 5\")      Wt Readings from Last 1 Encounters:   12/03/20 91.2 kg (201 lb)    Estimated body mass index is 33.45 kg/m  as calculated from the following:    Height as of this encounter: 1.651 m (5' 5\").    Weight as of this encounter: 91.2 kg (201 lb).       Anesthesia Plan      History & Physical Review      ASA Status:  .  OB Epidural Asa: 2            Postoperative Care      Consents  Anesthetic plan, risks, benefits and alternatives discussed with:  Patient..                 ZUNILDA Sahni CRNA  "

## 2020-12-04 NOTE — PLAN OF CARE
Received report from Tamia MONTOYA RN. Assumed pt cares at 2315. Pt sitting up in bed and reports ctxs are getting a bit stronger. Able to talk through some and stops to breathe through others. FHTs category 1. Reviewed options for pain management and different positions and available. Plans to get into the tub.

## 2020-12-04 NOTE — L&D DELIVERY NOTE
Delivery Summary    Indigo Lamb MRN# [id number]   Age: 30 year old YOB: 1990     ASSESSMENT & PLAN: 30 year old  presented @ 41w0d to BC for SROM and active labor management.     Stage 1: presented @ 4 cm.  Epidural for analgesia.  Contractions spaced and pitocin augmentation initiated.  Normal labor progress  Stage 2: pushed x 2 hours.  Tier 2 tracing.      of viable male, 7#5, Apgars 8/9, nuchal x 1  Placenta with 3vc  Lacerations: 2nd degree repaired with 2-0 and 3-0 vicryl  Mother and infant stable.    Evelin Fonseca M.D.         Zainab, Male-Indigo [[id number]]    Labor Event Times    Labor onset date: 20 Onset time:  1:18 AM   Dilation complete date: 20 Complete time:  2:15 PM   Start pushing date/time: 2020 1430      Labor Length    1st Stage (hrs): 12 (min): 57   2nd Stage (hrs): 2 (min): 16   3rd Stage (hrs): 0 (min): 1      Labor Events     labor?: No   steroids: None  Labor Type: Augmentation  Predominate monitoring during 1st stage: continuous electronic fetal monitoring     Antibiotics received during labor?: No     Rupture identifier: Sac 2  Rupture date/time: 12/3/20 193   Rupture type: Spontaneous rupture of membranes occuring during spontaneous labor or augmentation  Fluid color: Clear  Fluid odor: Normal     Augmentation: Oxytocin  Augmentation date/time: 20    1:1 continuous labor support provided by?: RN Labor partogram used?: no      Delivery/Placenta Date and Time    Delivery Date: 20 Delivery Time:  4:31 PM   Placenta Date/Time: 2020  4:32 PM     Vaginal Counts     Initial count performed by 2 team members:  Two Team Members   Machelle Fonseca       [place] Suture [place] [employer]ong[employer] Instruments   Initial counts 2  5    Added to count  3     Final counts 2 3 5    Placed during labor Accounted for at the end of labor   NA NA   NA NA   NA NA    Final count performed by 2 team members:  Two Team Members     "Raymundo Hawkins RN      Final count correct?: Yes     Apgars    Living status: Living   1 Minute 5 Minute 10 Minute 15 Minute 20 Minute   Skin color: 0  1       Heart rate: 2  2       Reflex irritability: 2  2       Muscle tone: 2  2       Respiratory effort: 2  2       Total: 8  9       Apgars assigned by: MACHELLE BROWN RN     Cord    Vessels: 3 Vessels Complications: Nuchal   Cord Blood Disposition: Lab Gases Sent?: No      [place] Resuscitation    Methods: None  Output in Delivery Room: Stool     [place] Measurements    Weight: 7 lb 5.1 oz Length: 1' 8.75\"   Head circumference: 34.3 cm       Skin to Skin and Feeding Plan    Skin to skin initiation date/time: 1841    Skin to skin with: Mother  Skin to skin end date/time:     Breastfeeding initiated date/time: 2020 1720  How do you plan to feed your baby: Breastfeeding     Labor Events and Shoulder Dystocia    Fetal Tracing Prior to Delivery: Category 2  Fetal Tracing Comments: fetal tachycardia to the 170's  Shoulder dystocia present?: Neg     Delivery (Maternal) (Provider to Complete) ([id number])    Episiotomy: None  Perineal lacerations: 2nd       Blood Loss  Mother: Indigo Lamb #[id number]   Start of Mother's Information    IO Blood Loss  20 0118 - 20 1007    Delivery QBL (mL) Hospital Encounter 191 mL    Total  191 mL         End of Mother's Information  Mother: Indigo Lamb #[id number]          Delivery - Provider to Complete ([id number])    Delivering clinician: Evelin Fonseca MD  Attempted Delivery Types (Choose all that apply): Spontaneous Vaginal Delivery  Delivery Type (Choose the 1 that will go to the Birth History): Vaginal, Spontaneous                   Other personnel:  Provider Role   Machelle Riley, RN Delivery Nurse   Dory Hawkins RN Charge Nurse   Ophelia Lazo APRN CNP Nurse Practitioner                Placenta    Delayed Cord Clamping: Done  Date/Time: 2020  4:32 PM  Removal: " Spontaneous  Disposition: Hospital disposal           Anesthesia    Method: Epidural                Presentation and Position    Presentation: Vertex                  Evelin Fonseca MD

## 2020-12-04 NOTE — H&P
[facility] Labor and Delivery History and Physical    Indigo Lamb MRN# [id number]   Age: 30 year old YOB: 1990     Date of Admission:  12/3/2020    Primary care provider: Maylin Monroy           Chief Complaint:   Indigo Lamb is a 30 year old female who is 41w0d pregnant and being admitted for SROM and labor management.  GBS negative; RH negative  SROM last evening; .          Pregnancy history:     OBSTETRIC HISTORY:    OB History    Para Term  AB Living   1 0 0 0 0 0   SAB TAB Ectopic Multiple Live Births   0 0 0 0 0      # Outcome Date GA Lbr Shalom/2nd Weight Sex Delivery Anes PTL Lv   1 Current                EDC: Estimated Date of Delivery: 20    Prenatal Labs:   Lab Results   Component Value Date    ABO A 2020    RH Neg 2020    AS Neg 2020    HEPBANG Nonreactive 2020    CHPCRT Negative 2020    GCPCRT Negative 2020    HGB 12.0 2020       GBS Status:   Lab Results   Component Value Date    GBS Negative 10/29/2020       Active Problem List  Patient Active Problem List   Diagnosis     CARDIOVASCULAR SCREENING; LDL GOAL LESS THAN 160     Dysmenorrhea     Recurrent UTI     Family history of nephrolithiasis     Vestibular dysfunction, unspecified laterality     Prenatal care, first pregnancy     Rh negative state in antepartum period     Prenatal care in third trimester     Pregnancy       Medication Prior to Admission  Facility-Administered Medications Prior to Admission   Medication Dose Route Frequency Provider Last Rate Last Admin     acetaminophen (TYLENOL) tablet 650 mg  650 mg Oral Q4H PRN Barabash, Jessica, DO         carboprost (HEMABATE) injection 250 mcg  250 mcg Intramuscular Once PRN Barabash, Jessica, DO         ibuprofen (ADVIL/MOTRIN) tablet 800 mg  800 mg Oral Once PRN Barabash, Jessica, DO         lactated ringers BOLUS 500 mL  500 mL Intravenous Once PRN Barabash, Jessica, DO         lactated  "ringers infusion   Intravenous Continuous Barabash, Jessica, DO         lidocaine (LMX4) kit   Topical Q1H PRN Barabash, Jessica, DO         lidocaine 1 % 0.1-1 mL  0.1-1 mL Other Q1H PRN Barabash, Jessica, DO         lidocaine 1 % 0.1-20 mL  0.1-20 mL Subcutaneous Once PRN Barabash, Jessica, DO         Medication Instructions - cervical ripening and induction medications   Does not apply Continuous PRN Barabash, Jessica, DO         Medication Instructions: misoprostol (CYTOTEC)- Nurse to discuss ordering with provider, if needed. Ordered via \"OB misoprostol (CYTOTEC) Postpartum Hemorrhage PANEL\"   Does not apply Continuous PRN Barabash, Jessica, DO         methylergonovine (METHERGINE) injection 200 mcg  200 mcg Intramuscular Once PRN Barabash, Jessica, DO         misoprostol (cervical ripening) (CYTOTEC) quarter-tab 25 mcg  25 mcg Oral Q2H PRN Barabash, Jessica, DO         naloxone (NARCAN) injection 0.1-0.4 mg  0.1-0.4 mg Intravenous Q2 Min PRN Barabash, Jessiac, DO         ondansetron (ZOFRAN) injection 4 mg  4 mg Intravenous Q6H PRN Barabash, Jessica, DO         oxyCODONE-acetaminophen (PERCOCET) 5-325 MG per tablet 1 tablet  1 tablet Oral Once PRN Barabash, Jessica, DO         oxytocin (PITOCIN) 30 units in 500 mL 0.9% NaCl infusion  100-340 mL/hr Intravenous Continuous PRN Barabash, Jessica, DO         oxytocin (PITOCIN) injection 10 Units  10 Units Intramuscular Once PRN Barabash, Jessica, DO         sodium chloride (PF) 0.9% PF flush 3 mL  3 mL Intracatheter q1 min prn Barabash, Jessica, DO         sodium chloride (PF) 0.9% PF flush 3 mL  3 mL Intracatheter Q8H Barabash, Jessica, DO         terbutaline (BRETHINE) injection 0.25 mg  0.25 mg Subcutaneous Once PRN Barabash, Jessica, DO         tranexamic acid 1 g in 100 mL 0.7% NaCl IV bag (premix)  1 g Intravenous Q30 Min PRN Barabash, Jessica, DO         Medications Prior to Admission   Medication Sig Dispense Refill Last Dose     clindamycin (CLINDAMAX) 1 % " external gel Apply topically 2 times daily 60 g 2 Past Week at Unknown time     Prenatal Vit-Fe Fumarate-FA (PRENATAL MULTIVITAMIN W/IRON) 27-0.8 MG tablet Take 1 tablet by mouth daily   12/3/2020 at Unknown time   .        Maternal Past Medical History:   History reviewed. No pertinent past medical history.                    Family History:     Family History   Problem Relation Age of Onset     Anxiety Disorder Mother      Hypertension Maternal Grandmother      Breast Cancer Maternal Grandmother      Heart Failure Maternal Grandfather      Alcoholism Maternal Grandfather      Allergies Brother      C.A.D. No family hx of      Diabetes No family hx of      Cerebrovascular Disease No family hx of      Cancer - colorectal No family hx of      Family history reviewed and updated in [facility]            Social History:     Social History     Tobacco Use     Smoking status: Never Smoker     Smokeless tobacco: Never Used     Tobacco comment: parents smoke   Substance Use Topics     Alcohol use: Not Currently     Comment: rare-quit with pregnancy            Review of Systems:   The Review of Systems is negative other than noted in the HPI          Physical Exam:   Vitals were reviewed  Temp: 99.2  F (37.3  C) Temp src: Oral BP: 104/56 Pulse: 85   Resp: 16 SpO2: 99 %      Constitutional:   awake, alert, cooperative, no apparent distress, and appears stated age     Cardiovascular:   Normal apical impulse, regular rate and rhythm, normal S1 and S2, no S3 or S4, and no murmur noted     Chest / Breast:   No masses         Abdomen:   Gravid, cephaluic      Cervix:   Membranes: SROM  clear amniotic fluid   Dilation: 6   Effacement: 80%   Station:0   Consistency: soft   Position: Anterior  Presentation:Cephalic  Fetal Heart Rate Tracing: reactive and reassuring, Tier 1 (normal)  Tocometer: external monitor and frequency q 3 minutes                       Assessment:   Indigo Lamb is a 41w0d pregnant female admitted with active  labor management and SROM.          Plan:   Admit - see IP orders  Pain medication epidural  Anticipate     Gissel Kinsey MD

## 2020-12-04 NOTE — PROGRESS NOTES
SVE at 0120 4/80/0. Pt breathing well through ctx's q 2-4 min. Using birthing ball and back into tub at this time. Intermittent auscultation FHTs 135 with increases present and decreases absent. Pt states she is tolerating ctxs at this time. Pain medication options reviewed.

## 2020-12-04 NOTE — PLAN OF CARE
Pt comfortable with epidural. FHT's category 1, SVE 4-5/80/0. Pitocin started at 2mu/min. Pt agreeable to plan of care. Will continue to monitor closely.

## 2020-12-05 LAB — BLOOD BANK CMNT PATIENT-IMP: NORMAL

## 2020-12-05 PROCEDURE — 85461 HEMOGLOBIN FETAL: CPT | Performed by: OBSTETRICS & GYNECOLOGY

## 2020-12-05 PROCEDURE — 36415 COLL VENOUS BLD VENIPUNCTURE: CPT | Performed by: OBSTETRICS & GYNECOLOGY

## 2020-12-05 PROCEDURE — 86901 BLOOD TYPING SEROLOGIC RH(D): CPT | Performed by: OBSTETRICS & GYNECOLOGY

## 2020-12-05 PROCEDURE — 81001 URINALYSIS AUTO W/SCOPE: CPT | Performed by: OBSTETRICS & GYNECOLOGY

## 2020-12-05 PROCEDURE — 86900 BLOOD TYPING SEROLOGIC ABO: CPT | Performed by: OBSTETRICS & GYNECOLOGY

## 2020-12-05 PROCEDURE — 120N000001 HC R&B MED SURG/OB

## 2020-12-05 PROCEDURE — 87086 URINE CULTURE/COLONY COUNT: CPT | Performed by: OBSTETRICS & GYNECOLOGY

## 2020-12-05 PROCEDURE — 250N000013 HC RX MED GY IP 250 OP 250 PS 637: Performed by: OBSTETRICS & GYNECOLOGY

## 2020-12-05 RX ORDER — OXYCODONE HYDROCHLORIDE 5 MG/1
5 TABLET ORAL EVERY 4 HOURS PRN
Status: DISCONTINUED | OUTPATIENT
Start: 2020-12-05 | End: 2020-12-07 | Stop reason: HOSPADM

## 2020-12-05 RX ADMIN — IBUPROFEN 800 MG: 800 TABLET ORAL at 11:07

## 2020-12-05 RX ADMIN — PRENATAL VITAMINS-IRON FUMARATE 27 MG IRON-FOLIC ACID 0.8 MG TABLET 1 TABLET: at 09:37

## 2020-12-05 RX ADMIN — ACETAMINOPHEN 650 MG: 325 TABLET, FILM COATED ORAL at 05:20

## 2020-12-05 RX ADMIN — ACETAMINOPHEN 650 MG: 325 TABLET, FILM COATED ORAL at 20:48

## 2020-12-05 RX ADMIN — ACETAMINOPHEN 650 MG: 325 TABLET, FILM COATED ORAL at 00:48

## 2020-12-05 RX ADMIN — ACETAMINOPHEN 650 MG: 325 TABLET, FILM COATED ORAL at 14:48

## 2020-12-05 RX ADMIN — IBUPROFEN 800 MG: 800 TABLET ORAL at 17:40

## 2020-12-05 RX ADMIN — DOCUSATE SODIUM AND SENNOSIDES 2 TABLET: 8.6; 5 TABLET ORAL at 20:48

## 2020-12-05 RX ADMIN — ACETAMINOPHEN 650 MG: 325 TABLET, FILM COATED ORAL at 09:36

## 2020-12-05 RX ADMIN — DOCUSATE SODIUM AND SENNOSIDES 1 TABLET: 8.6; 5 TABLET ORAL at 09:36

## 2020-12-05 RX ADMIN — HYDROCORTISONE: 25 CREAM TOPICAL at 18:50

## 2020-12-05 RX ADMIN — IBUPROFEN 800 MG: 800 TABLET ORAL at 05:20

## 2020-12-05 NOTE — PLAN OF CARE
Up to BR for pericare. Pt was able to void. Mother and baby transferred to postpartum unit at 2000 via gurdeep shabazz and keith after completion of immediate recovery period. Patient oriented to room and instructed to call for assistance when up to the bathroom the next time. Mother and baby bonding well and in stable condition upon transfer.

## 2020-12-05 NOTE — PROGRESS NOTES
"PPD # 1    S: very sore bottom.  Pain somewhat controlled.  Bleeding slowed.  Ambulating and voiding  O: BP 99/52   Pulse 85   Temp 97.9  F (36.6  C) (Oral)   Resp 16   Ht 1.651 m (5' 5\")   Wt 91.2 kg (201 lb)   LMP 2020 (Exact Date)   SpO2 99%   Breastfeeding Unknown   BMI 33.45 kg/m     NAD  Abd: soft, nontender, fundus firm  Sarah: minimal edema, no evidence of hematoma  Ext: calves nontender    A/P PPD # 1 s/p     Routine PP care.    Evelin Fonseca M.D.    "

## 2020-12-05 NOTE — PLAN OF CARE
Mother complains of perineum pain. Soaked in bath tub, ice,tucks and spray used for comfort. Motrin and tylenol given with fair relief from pain. Perineum swollen but approximated. Bonding with infant, assistance given with latching as needed.

## 2020-12-05 NOTE — PLAN OF CARE
Data: Vital signs within normal limits. Postpartum checks within normal limits - see flow record. Patient eating and drinking normally. Patient able to empty bladder independently and is up ambulating. No apparent signs of infection.  Perineum sore, ice packs and tucks applied. Patient performing self cares and is able to care for infant.  Action: Patient medicated during the shift for pain. See MAR. Patient reassessed within 1 hour after each medication and pain was improved - patient stated she was comfortable. Patient education done about breastfeeding, latch, expected lochia flow and warning signs of hemorrhage, infant cares. See flow record.  Response: Positive attachment behaviors observed with infant. Support persons 1 present.   Plan: Anticipate discharge on 12/6/2020.

## 2020-12-05 NOTE — PLAN OF CARE
Pt is PPD # 1 from a vaginal delivery. Up IND; voiding adequate amounts w/o difficulty. VSS. FF mid at U/1; Light lochia noted. Pain has been managed with motrin and tylenol. Pt has been resting and sleeping in between cares overnight. Discharge pending for 12/6; will continue to monitor and reassess.

## 2020-12-06 VITALS
SYSTOLIC BLOOD PRESSURE: 123 MMHG | TEMPERATURE: 98.1 F | BODY MASS INDEX: 33.49 KG/M2 | DIASTOLIC BLOOD PRESSURE: 73 MMHG | RESPIRATION RATE: 16 BRPM | HEIGHT: 65 IN | OXYGEN SATURATION: 96 % | HEART RATE: 98 BPM | WEIGHT: 201 LBS

## 2020-12-06 LAB
ABO + RH BLD: NORMAL
ABO + RH BLD: NORMAL
ALBUMIN UR-MCNC: 100 MG/DL
ALBUMIN UR-MCNC: NEGATIVE MG/DL
APPEARANCE UR: ABNORMAL
APPEARANCE UR: CLEAR
BACTERIA #/AREA URNS HPF: ABNORMAL /HPF
BACTERIA #/AREA URNS HPF: ABNORMAL /HPF
BILIRUB UR QL STRIP: NEGATIVE
BILIRUB UR QL STRIP: NEGATIVE
BLOOD BANK CMNT PATIENT-IMP: NORMAL
COLOR UR AUTO: YELLOW
COLOR UR AUTO: YELLOW
DATE RH IMM GL GVN: NORMAL
FETAL CELL SCN BLD QL ROSETTE: NORMAL
GLUCOSE UR STRIP-MCNC: NEGATIVE MG/DL
GLUCOSE UR STRIP-MCNC: NEGATIVE MG/DL
HGB UR QL STRIP: ABNORMAL
HGB UR QL STRIP: NEGATIVE
KETONES UR STRIP-MCNC: NEGATIVE MG/DL
KETONES UR STRIP-MCNC: NEGATIVE MG/DL
LEUKOCYTE ESTERASE UR QL STRIP: ABNORMAL
LEUKOCYTE ESTERASE UR QL STRIP: ABNORMAL
MUCOUS THREADS #/AREA URNS LPF: PRESENT /LPF
NITRATE UR QL: NEGATIVE
NITRATE UR QL: NEGATIVE
PH UR STRIP: 5 PH (ref 5–7)
PH UR STRIP: 6 PH (ref 5–7)
RBC #/AREA URNS AUTO: 1 /HPF (ref 0–2)
RBC #/AREA URNS AUTO: >182 /HPF (ref 0–2)
RH IG VIALS RECOM PATIENT: NORMAL
SOURCE: ABNORMAL
SOURCE: ABNORMAL
SP GR UR STRIP: 1.01 (ref 1–1.03)
SP GR UR STRIP: 1.01 (ref 1–1.03)
SQUAMOUS #/AREA URNS AUTO: 5 /HPF (ref 0–1)
UROBILINOGEN UR STRIP-MCNC: 0 MG/DL (ref 0–2)
UROBILINOGEN UR STRIP-MCNC: 0 MG/DL (ref 0–2)
WBC #/AREA URNS AUTO: 10 /HPF (ref 0–5)
WBC #/AREA URNS AUTO: 159 /HPF (ref 0–5)

## 2020-12-06 PROCEDURE — 250N000011 HC RX IP 250 OP 636: Performed by: OBSTETRICS & GYNECOLOGY

## 2020-12-06 PROCEDURE — 250N000013 HC RX MED GY IP 250 OP 250 PS 637: Performed by: OBSTETRICS & GYNECOLOGY

## 2020-12-06 PROCEDURE — 81001 URINALYSIS AUTO W/SCOPE: CPT | Performed by: OBSTETRICS & GYNECOLOGY

## 2020-12-06 RX ADMIN — PRENATAL VITAMINS-IRON FUMARATE 27 MG IRON-FOLIC ACID 0.8 MG TABLET 1 TABLET: at 08:30

## 2020-12-06 RX ADMIN — IBUPROFEN 800 MG: 800 TABLET ORAL at 00:27

## 2020-12-06 RX ADMIN — IBUPROFEN 800 MG: 800 TABLET ORAL at 06:05

## 2020-12-06 RX ADMIN — HUMAN RHO(D) IMMUNE GLOBULIN 300 MCG: 300 INJECTION, SOLUTION INTRAMUSCULAR at 18:01

## 2020-12-06 RX ADMIN — IBUPROFEN 800 MG: 800 TABLET ORAL at 19:46

## 2020-12-06 RX ADMIN — ACETAMINOPHEN 650 MG: 325 TABLET, FILM COATED ORAL at 08:31

## 2020-12-06 RX ADMIN — IBUPROFEN 800 MG: 800 TABLET ORAL at 13:39

## 2020-12-06 RX ADMIN — DOCUSATE SODIUM AND SENNOSIDES 2 TABLET: 8.6; 5 TABLET ORAL at 08:30

## 2020-12-06 NOTE — PLAN OF CARE
First void after serrano removed was at 1330  She voided 250 ml with 371cc residual left in bladder via bladder scanner.  will scan after next void then discuss with MD.  Patient feeling tender when bladder is palpated.  Then after about 20 min she attempted to void and emptied 300cc.  Patient would like to measure at least one more time before going home

## 2020-12-06 NOTE — PLAN OF CARE
Pt stated that burning was feeling better earlier this evening, although she still felt as if she was voiding only small amounts of urine and feels like her bladder is still full. Was able to collect small amount of urine for UA/UC - sent to lab. FF with scant flow, bladder feels full on palpation. Bladder scanned for >999. Dr. Fonseca notified. Rice inserted with 1500mls urine returned. Pt states she is feeling better at this time. Will continue to monitor.

## 2020-12-06 NOTE — PROGRESS NOTES
"PPD # 2    S: patient needed serrano placement last night/early this morning for urinary retention.    O: /55 (BP Location: Left arm)   Pulse 80   Temp 98.5  F (36.9  C) (Oral)   Resp 18   Ht 1.651 m (5' 5\")   Wt 91.2 kg (201 lb)   LMP 2020 (Exact Date)   SpO2 96%   Breastfeeding Unknown   BMI 33.45 kg/m     NAD  Abd: soft, nontender, fundus firm  Ext: calves nontender    A/P PPD # 2 s/p     Routine PP care.  Attempt voiding trials.  Will discharge home when voiding freely or with home serrano if unable.     Evelin Fonseca M.D.    "

## 2020-12-06 NOTE — PLAN OF CARE
Dwayne removed at 1045.  Patient reports that she feels much better today.  Breastfeeding going well  mother just concerned baby would not latch this am  baby awakened for vitals and placed back to breast  he then nursed fairly well and mother feeling reassured  sign on door for her to rest   holding baby for her to rest

## 2020-12-06 NOTE — ANESTHESIA POSTPROCEDURE EVALUATION
Patient: Indigo Lamb    * No procedures listed *    Diagnosis:* No pre-op diagnosis entered *  Diagnosis Additional Information: No value filed.    Anesthesia Type:  No value filed.    Note:  Anesthesia Post Evaluation    Patient location during evaluation: Bedside  Patient participation: Able to fully participate in evaluation  Level of consciousness: awake and alert  Pain management: adequate  multimodal analgesia used between 6 hours prior to anesthesia start to PACU dischargeAirway patency: patent  Cardiovascular status: acceptable  Respiratory status: acceptable  two or more mitigation strategies used for obstructive sleep apneaHydration status: acceptable  PONV: none     Anesthetic complications: None          Last vitals:  Vitals:    12/06/20 0115 12/06/20 1100 12/06/20 1345   BP: 103/55 109/60 121/72   Pulse: 80 60 76   Resp: 18 16 16   Temp: 36.9  C (98.5  F) 36.3  C (97.4  F) 36.3  C (97.4  F)   SpO2:            Electronically Signed By: ZUNILDA Seay CRNA  December 6, 2020  3:16 PM

## 2020-12-06 NOTE — DISCHARGE SUMMARY
"[facility] Discharge Summary    Indigo Lamb MRN# [id number]   Age: 30 year old YOB: 1990     Date of Admission:  12/3/2020  Date of Discharge::  12/6/2020  Admitting Physician:  Gissel Kinsey MD  Discharge Physician:  Evelin Fonseca MD     Home clinic: [facility]          Admission Diagnoses:   Intrauterine pregnancy at 41w0d  weeks gestation          Discharge Diagnosis:   Normal spontaneous vaginal delivery  Intrauterine pregnancy at 41w0d  weeks gestation          Procedures:   Procedure(s): Repair of second degree perineal laceration       No other procedures performed during this admission           Medications Prior to Admission:     Facility-Administered Medications Prior to Admission   Medication Dose Route Frequency Provider Last Rate Last Admin     [DISCONTINUED] acetaminophen (TYLENOL) tablet 650 mg  650 mg Oral Q4H PRN Barabash, Jessica, DO         [DISCONTINUED] carboprost (HEMABATE) injection 250 mcg  250 mcg Intramuscular Once PRN Barabash, Jessica, DO         [DISCONTINUED] ibuprofen (ADVIL/MOTRIN) tablet 800 mg  800 mg Oral Once PRN Barabash, Jessica, DO         [DISCONTINUED] lactated ringers BOLUS 500 mL  500 mL Intravenous Once PRN Barabash, Jessica, DO         [DISCONTINUED] lactated ringers infusion   Intravenous Continuous Barabash, Jessica, DO         [DISCONTINUED] lidocaine (LMX4) kit   Topical Q1H PRN Barabash, Jessica, DO         [DISCONTINUED] lidocaine 1 % 0.1-1 mL  0.1-1 mL Other Q1H PRN Barabash, Jessica, DO         [DISCONTINUED] lidocaine 1 % 0.1-20 mL  0.1-20 mL Subcutaneous Once PRN Barabash, Jessica, DO         [DISCONTINUED] Medication Instructions - cervical ripening and induction medications   Does not apply Continuous PRN Barabash, Jessica, DO         [DISCONTINUED] Medication Instructions: misoprostol (CYTOTEC)- Nurse to discuss ordering with provider, if needed. Ordered via \"OB misoprostol (CYTOTEC) Postpartum " "Hemorrhage PANEL\"   Does not apply Continuous PRN Barabash, Jessica, DO         [DISCONTINUED] methylergonovine (METHERGINE) injection 200 mcg  200 mcg Intramuscular Once PRN Barabash, Jessica, DO         [DISCONTINUED] misoprostol (cervical ripening) (CYTOTEC) quarter-tab 25 mcg  25 mcg Oral Q2H PRN Barabash, Jessica, DO         [DISCONTINUED] naloxone (NARCAN) injection 0.1-0.4 mg  0.1-0.4 mg Intravenous Q2 Min PRN Barabash, Jessica, DO         [DISCONTINUED] ondansetron (ZOFRAN) injection 4 mg  4 mg Intravenous Q6H PRN Barabash, Jessica, DO         [DISCONTINUED] oxyCODONE-acetaminophen (PERCOCET) 5-325 MG per tablet 1 tablet  1 tablet Oral Once PRN Barabash, Jessica, DO         [DISCONTINUED] oxytocin (PITOCIN) 30 units in 500 mL 0.9% NaCl infusion  100-340 mL/hr Intravenous Continuous PRN Barabash, Jessica, DO         [DISCONTINUED] oxytocin (PITOCIN) injection 10 Units  10 Units Intramuscular Once PRN Barabash, Jessica, DO         [DISCONTINUED] sodium chloride (PF) 0.9% PF flush 3 mL  3 mL Intracatheter q1 min prn Barabash, Jessica, DO         [DISCONTINUED] sodium chloride (PF) 0.9% PF flush 3 mL  3 mL Intracatheter Q8H Barabash, Jessica, DO         [DISCONTINUED] terbutaline (BRETHINE) injection 0.25 mg  0.25 mg Subcutaneous Once PRN Barabash, Jessica, DO         [DISCONTINUED] tranexamic acid 1 g in 100 mL 0.7% NaCl IV bag (premix)  1 g Intravenous Q30 Min PRN Barabash, Jessica, DO         Medications Prior to Admission   Medication Sig Dispense Refill Last Dose     clindamycin (CLINDAMAX) 1 % external gel Apply topically 2 times daily 60 g 2 Past Week at Unknown time     [DISCONTINUED] Prenatal Vit-Fe Fumarate-FA (PRENATAL MULTIVITAMIN W/IRON) 27-0.8 MG tablet Take 1 tablet by mouth daily   12/3/2020 at Unknown time             Discharge Medications:     Current Discharge Medication List      CONTINUE these medications which have NOT CHANGED    Details   clindamycin (CLINDAMAX) 1 % external gel Apply " topically 2 times daily  Qty: 60 g, Refills: 2    Associated Diagnoses: Acne vulgaris         STOP taking these medications       Prenatal Vit-Fe Fumarate-FA (PRENATAL MULTIVITAMIN W/IRON) 27-0.8 MG tablet Comments:   Reason for Stopping:                     Consultations:   No consultations were requested during this admission          Brief History of Labor:   30 year old  presented @ 41w0d to [facility] for SROM and active labor management.      Stage 1: presented @ 4 cm.  Epidural for analgesia.  Contractions spaced and pitocin augmentation initiated.  Normal labor progress  Stage 2: pushed x 2 hours.  Tier 2 tracing.       of viable male, 7#5, Apgars 8/9, nuchal x 1  Placenta with 3vc  Lacerations: 2nd degree repaired with 2-0 and 3-0 vicryl  Mother and infant stable           Hospital Course:   The patient's hospital course was unremarkable.  On discharge, her pain was well controlled. Vaginal bleeding is similar to peak menstrual flow.  Patient with urinary retention on PPD # 1 and serrano placed.  Voiding trials resumed.  Ambulating well and tolerating a normal diet.  No fever.  Breastfeeding well.  Infant is stable.  No bowel movement yet.*  She was discharged on post-partum day #2.    Post-partum hemoglobin:   Hemoglobin   Date Value Ref Range Status   2020 12.0 11.7 - 15.7 g/dL Final             Discharge Instructions and Follow-Up:   Discharge diet: Regular   Discharge activity: No sex for 6 week(s)   Discharge follow-up: Follow up with OB clinic in 6 weeks   Wound care: Drink plenty of fluids  Ice to area for comfort           Discharge Disposition:   Discharged to home      Attestation:  I have reviewed today's vital signs, notes, medications, labs and imaging.    Evelin Fonseca MD      all other ROS negative except as per HPI

## 2020-12-07 LAB
BACTERIA SPEC CULT: NORMAL
Lab: NORMAL
SPECIMEN SOURCE: NORMAL

## 2020-12-07 RX ORDER — NITROFURANTOIN 25; 75 MG/1; MG/1
100 CAPSULE ORAL 2 TIMES DAILY
Qty: 14 CAPSULE | Refills: 0 | Status: SHIPPED | OUTPATIENT
Start: 2020-12-07 | End: 2020-12-14

## 2020-12-07 NOTE — PLAN OF CARE
Pt planning to board overnight and discharge home with baby tomorrow, 12/7. PVR's within normal limits. Pt denies pain with voids and pressure. Able to void small frequent amounts w/o difficulty. Pain well managed. Pt aware of plan and in agreement.

## 2020-12-07 NOTE — PROGRESS NOTES
Pt reports urinary tract infection symptoms, discussed with Dr Cardozo. Pt has UC pending from 12/5. Pt was given option to wait for UC results or start on Macrobid and if UC comes back negative to stop antibiotic. Pt wishes to start macrobid. Dr Cardozo order for Macrobid 100mg BID x7 days. Pt agreeable to plan.       Yuliana Pruitt RN 12/7/2020 8:50 AM

## 2021-01-15 ENCOUNTER — PRENATAL OFFICE VISIT (OUTPATIENT)
Dept: OBGYN | Facility: CLINIC | Age: 31
End: 2021-01-15
Payer: COMMERCIAL

## 2021-01-15 VITALS
BODY MASS INDEX: 29.52 KG/M2 | WEIGHT: 177.2 LBS | DIASTOLIC BLOOD PRESSURE: 68 MMHG | HEIGHT: 65 IN | HEART RATE: 67 BPM | RESPIRATION RATE: 16 BRPM | SYSTOLIC BLOOD PRESSURE: 115 MMHG | TEMPERATURE: 98.5 F

## 2021-01-15 DIAGNOSIS — Z30.011 BCP (BIRTH CONTROL PILLS) INITIATION: ICD-10-CM

## 2021-01-15 DIAGNOSIS — Z12.4 PAP SMEAR FOR CERVICAL CANCER SCREENING: ICD-10-CM

## 2021-01-15 PROBLEM — Z34.90 PREGNANCY: Status: RESOLVED | Noted: 2020-12-03 | Resolved: 2021-01-15

## 2021-01-15 PROBLEM — Z34.00 PRENATAL CARE, FIRST PREGNANCY: Status: RESOLVED | Noted: 2020-03-30 | Resolved: 2021-01-15

## 2021-01-15 PROCEDURE — 99207 PR POST PARTUM EXAM: CPT | Performed by: OBSTETRICS & GYNECOLOGY

## 2021-01-15 PROCEDURE — G0145 SCR C/V CYTO,THINLAYER,RESCR: HCPCS | Performed by: OBSTETRICS & GYNECOLOGY

## 2021-01-15 RX ORDER — ACETAMINOPHEN AND CODEINE PHOSPHATE 120; 12 MG/5ML; MG/5ML
0.35 SOLUTION ORAL DAILY
Qty: 90 TABLET | Refills: 3 | Status: SHIPPED | OUTPATIENT
Start: 2021-01-15 | End: 2021-04-15

## 2021-01-15 ASSESSMENT — PATIENT HEALTH QUESTIONNAIRE - PHQ9
5. POOR APPETITE OR OVEREATING: NOT AT ALL
SUM OF ALL RESPONSES TO PHQ QUESTIONS 1-9: 1

## 2021-01-15 ASSESSMENT — ANXIETY QUESTIONNAIRES
3. WORRYING TOO MUCH ABOUT DIFFERENT THINGS: NOT AT ALL
2. NOT BEING ABLE TO STOP OR CONTROL WORRYING: NOT AT ALL
GAD7 TOTAL SCORE: 0
IF YOU CHECKED OFF ANY PROBLEMS ON THIS QUESTIONNAIRE, HOW DIFFICULT HAVE THESE PROBLEMS MADE IT FOR YOU TO DO YOUR WORK, TAKE CARE OF THINGS AT HOME, OR GET ALONG WITH OTHER PEOPLE: NOT DIFFICULT AT ALL
5. BEING SO RESTLESS THAT IT IS HARD TO SIT STILL: NOT AT ALL
6. BECOMING EASILY ANNOYED OR IRRITABLE: NOT AT ALL
7. FEELING AFRAID AS IF SOMETHING AWFUL MIGHT HAPPEN: NOT AT ALL
1. FEELING NERVOUS, ANXIOUS, OR ON EDGE: NOT AT ALL

## 2021-01-15 ASSESSMENT — MIFFLIN-ST. JEOR: SCORE: 1534.65

## 2021-01-15 NOTE — PROGRESS NOTES
"Indigo is here for a 6-week postpartum checkup.    She had a  of a liveborn baby boy, weight 7 pounds 5 oz.  The delivery was  complicated by urinary retention.  Since delivery, she has been breast feeding.  She has not had a normal menses.  She has not had intercourse.  Patient screened for postpartum depression and complaints are NEGATIVE. Screening has also been completed for intimate partner violence.    Her last pap was  and was Normal    EXAM: /68 (BP Location: Right arm, Patient Position: Chair, Cuff Size: Adult Regular)   Pulse 67   Temp 98.5  F (36.9  C) (Tympanic)   Resp 16   Ht 1.651 m (5' 5\")   Wt 80.4 kg (177 lb 3.2 oz)   LMP 2020 (Exact Date)   Breastfeeding Yes   BMI 29.49 kg/m      HEENT: grossly normal.  NECK: no lymphadenopathy or thyromegaly.  ABDOMEN: soft, non tender, good bowel sounds, without masses, rebound, guarding or tenderness.  EXTREMITIES: Warm to touch, no ankle edema or calf tenderness.    PELVIC:    External genitalia: normal without lesion, perineum well healed   Vagina: normal mucosa and rugae, normal discharge.  Cervix: normal without lesion.  Uterus: small, mobile, nontender.  Adnexa: no masses, no tenderness  Rectal: deferred, external hemorrhoids absent    A/P  Routine Postpartum    1. Contraception: micronor  2. Annual due .  Pap done today.  Reviewed COVID vaccination    Evelin Fonseca M.D.   "

## 2021-01-15 NOTE — NURSING NOTE
"Initial /68 (BP Location: Right arm, Patient Position: Chair, Cuff Size: Adult Regular)   Pulse 67   Temp 98.5  F (36.9  C) (Tympanic)   Resp 16   Ht 1.651 m (5' 5\")   Wt 80.4 kg (177 lb 3.2 oz)   LMP 02/21/2020 (Exact Date)   Breastfeeding Yes   BMI 29.49 kg/m   Estimated body mass index is 29.49 kg/m  as calculated from the following:    Height as of this encounter: 1.651 m (5' 5\").    Weight as of this encounter: 80.4 kg (177 lb 3.2 oz). .    Amy Almonte CMA    "

## 2021-01-16 ASSESSMENT — ANXIETY QUESTIONNAIRES: GAD7 TOTAL SCORE: 0

## 2021-01-19 LAB
COPATH REPORT: NORMAL
PAP: NORMAL

## 2021-02-16 ENCOUNTER — VIRTUAL VISIT (OUTPATIENT)
Dept: OBGYN | Facility: CLINIC | Age: 31
End: 2021-02-16
Payer: COMMERCIAL

## 2021-02-16 DIAGNOSIS — F32.0 CURRENT MILD EPISODE OF MAJOR DEPRESSIVE DISORDER WITHOUT PRIOR EPISODE (H): Primary | ICD-10-CM

## 2021-02-16 PROCEDURE — 99214 OFFICE O/P EST MOD 30 MIN: CPT | Mod: TEL | Performed by: OBSTETRICS & GYNECOLOGY

## 2021-02-16 NOTE — PROGRESS NOTES
"Indigo is a 30 year old who is being evaluated via a billable telephone visit.      What phone number would you like to be contacted at? [phone]    How would you like to obtain your AVS? Latrice Sood is a 30 year old  here for follow up of depression/anxiety.  Waxing/waning symptoms of depression and anxiety.  Has never been on medication before.  Is getting ready to go back to work.  When she is more symptomatic, she feels sad, cries easily and has a hard time being motivated.  Is able to care for herself and the baby adequately, but some days are \"just hard\".  Is planning on reaching out to [facility] for additional counseling.     ROS: Ten point review of systems was reviewed and negative except the above.    PMH: Her past medical, surgical, and obstetric histories were reviewed and are documented in their appropriate chart areas.    ALL/Meds: Her medication and allergy histories were reviewed and are documented in their appropriate chart areas.    FH: Her family history was reviewed and documented in its appropriate chart area.     PE: deferred due to Virtual visit    A/P 30 year old  here for     ICD-10-CM    1. Current mild episode of major depressive disorder without prior episode (H)  F32.0 sertraline (ZOLOFT) 50 MG tablet        1. Reviewed at length pros/cons of medication as well as behavioural therapy.  Patient reassured that there are a number of external factors: , pandemic, return to work as well as a number of personal factors (hormonal changes) that are predisposing her to her symptoms.      Discussed trial of SSRI which she is amenable to.  Reviewed titrating accordingly.  Discussed consideration for vistaril or buspar, but patient feels she can manage for now.      Additionally, we talked about behavioral therapies aside from counseling such as meditation and various apps that allow for more mindfulness.      Evelin Fonseca M.D.    Phone call duration: 20 minutes    "

## 2021-04-06 ENCOUNTER — MEDICAL CORRESPONDENCE (OUTPATIENT)
Dept: HEALTH INFORMATION MANAGEMENT | Facility: CLINIC | Age: 31
End: 2021-04-06

## 2021-04-15 ENCOUNTER — VIRTUAL VISIT (OUTPATIENT)
Dept: OBGYN | Facility: CLINIC | Age: 31
End: 2021-04-15
Payer: COMMERCIAL

## 2021-04-15 DIAGNOSIS — F32.0 CURRENT MILD EPISODE OF MAJOR DEPRESSIVE DISORDER WITHOUT PRIOR EPISODE (H): ICD-10-CM

## 2021-04-15 PROCEDURE — 99212 OFFICE O/P EST SF 10 MIN: CPT | Mod: TEL | Performed by: OBSTETRICS & GYNECOLOGY

## 2021-04-15 ASSESSMENT — ANXIETY QUESTIONNAIRES
6. BECOMING EASILY ANNOYED OR IRRITABLE: NOT AT ALL
2. NOT BEING ABLE TO STOP OR CONTROL WORRYING: NOT AT ALL
3. WORRYING TOO MUCH ABOUT DIFFERENT THINGS: NOT AT ALL
GAD7 TOTAL SCORE: 0
5. BEING SO RESTLESS THAT IT IS HARD TO SIT STILL: NOT AT ALL
1. FEELING NERVOUS, ANXIOUS, OR ON EDGE: NOT AT ALL
IF YOU CHECKED OFF ANY PROBLEMS ON THIS QUESTIONNAIRE, HOW DIFFICULT HAVE THESE PROBLEMS MADE IT FOR YOU TO DO YOUR WORK, TAKE CARE OF THINGS AT HOME, OR GET ALONG WITH OTHER PEOPLE: NOT DIFFICULT AT ALL
7. FEELING AFRAID AS IF SOMETHING AWFUL MIGHT HAPPEN: NOT AT ALL

## 2021-04-15 ASSESSMENT — PATIENT HEALTH QUESTIONNAIRE - PHQ9
5. POOR APPETITE OR OVEREATING: NOT AT ALL
SUM OF ALL RESPONSES TO PHQ QUESTIONS 1-9: 2

## 2021-04-15 NOTE — PROGRESS NOTES
Indigo is a 30 year old who is being evaluated via a billable telephone visit.      What phone number would you like to be contacted at? [phone]    How would you like to obtain your AVS? Latrice Sood is a 30 year old  here for follow up of depression.  Has returned to work and has figured out some normalcy.  Has been on Zoloft 50 mg every day.  Doing well.      ROS: Ten point review of systems was reviewed and negative except the above.    PMH: Her past medical, surgical, and obstetric histories were reviewed and are documented in their appropriate chart areas.    ALL/Meds: Her medication and allergy histories were reviewed and are documented in their appropriate chart areas.    FH: Her family history was reviewed and documented in its appropriate chart area.     PE: limited by virtual visit  PHQ-9 (Pfizer) 4/15/2021   1.  Little interest or pleasure in doing things 0   2.  Feeling down, depressed, or hopeless 1   3.  Trouble falling or staying asleep, or sleeping too much 0   4.  Feeling tired or having little energy 1   5.  Poor appetite or overeating 0   6.  Feeling bad about yourself 0   7.  Trouble concentrating 0   8.  Moving slowly or restless 0   9.  Suicidal or self-harm thoughts 0   PHQ-9 Total Score 2   Difficulty at work, home, or with people Not difficult at all     MARCO A-7   [employer], 2002; Used with Permission) 4/15/2021   1. Feeling nervous, anxious, or on edge 0   2. Not being able to stop or control worrying 0   3. Worrying too much about different things 0   4. Trouble relaxing 0   5. Being so restless that it is hard to sit still 0   6. Becoming easily annoyed or irritable 0   7. Feeling afraid, as if something awful might happen 0   MARCO A-7 Total Score 0   If you checked any problems, how difficult have they made it for you to do your work, take care of things at home, or get along with other people? Not difficult at all       A/P 30 year old  here for     ICD-10-CM    1. Current  mild episode of major depressive disorder without prior episode (H)  F32.0 sertraline (ZOLOFT) 50 MG tablet        1. Doing well on medication.  Discussed when to consider weaning off medication.  Discussed birth control--planning on condoms rather than minipill.      Evelin Fonseca M.D.          Phone call duration: 5 minutes

## 2021-04-16 ASSESSMENT — ANXIETY QUESTIONNAIRES: GAD7 TOTAL SCORE: 0

## 2021-06-04 ENCOUNTER — MEDICAL CORRESPONDENCE (OUTPATIENT)
Dept: HEALTH INFORMATION MANAGEMENT | Facility: CLINIC | Age: 31
End: 2021-06-04

## 2021-09-18 ENCOUNTER — HEALTH MAINTENANCE LETTER (OUTPATIENT)
Age: 31
End: 2021-09-18

## 2021-10-19 DIAGNOSIS — F32.0 CURRENT MILD EPISODE OF MAJOR DEPRESSIVE DISORDER WITHOUT PRIOR EPISODE (H): ICD-10-CM

## 2021-10-19 NOTE — TELEPHONE ENCOUNTER
Last Written Prescription Date:  4/15/21  Last Fill Quantity: 90,  # refills: 1   Last office visit: Visit date not found with prescribing provider:  5/15/21 Evisit with Dr. Fonseca.  Future Office Visit:  None pending.    Physician last note states to consider when to start waning off medication.    Qufetion if patient should continue depression medication with OB GYN or turn over to Family Practice.    Please advise.     Dorcas Pizarro RN on 10/19/2021 at 11:31 AM

## 2021-10-19 NOTE — TELEPHONE ENCOUNTER
Patient to follow up for med refill and annual by 4/2022.    med refilled.     Evelin Fonseca M.D.

## 2021-10-19 NOTE — TELEPHONE ENCOUNTER
Spoke with patient by phone and encouraged to schedule an appoinmentt by 4/22 for anymore refills.     Dorcas Pizarro RN on 10/19/2021 at 12:50 PM

## 2022-01-04 ENCOUNTER — E-VISIT (OUTPATIENT)
Dept: URGENT CARE | Facility: CLINIC | Age: 32
End: 2022-01-04
Payer: COMMERCIAL

## 2022-01-04 ENCOUNTER — LAB (OUTPATIENT)
Dept: LAB | Facility: CLINIC | Age: 32
End: 2022-01-04
Payer: COMMERCIAL

## 2022-01-04 DIAGNOSIS — Z20.822 SUSPECTED COVID-19 VIRUS INFECTION: Primary | ICD-10-CM

## 2022-01-04 DIAGNOSIS — Z20.822 SUSPECTED COVID-19 VIRUS INFECTION: ICD-10-CM

## 2022-01-04 PROCEDURE — 99000 SPECIMEN HANDLING OFFICE-LAB: CPT

## 2022-01-04 PROCEDURE — U0003 INFECTIOUS AGENT DETECTION BY NUCLEIC ACID (DNA OR RNA); SEVERE ACUTE RESPIRATORY SYNDROME CORONAVIRUS 2 (SARS-COV-2) (CORONAVIRUS DISEASE [COVID-19]), AMPLIFIED PROBE TECHNIQUE, MAKING USE OF HIGH THROUGHPUT TECHNOLOGIES AS DESCRIBED BY CMS-2020-01-R: HCPCS | Mod: 90

## 2022-01-04 PROCEDURE — U0005 INFEC AGEN DETEC AMPLI PROBE: HCPCS | Mod: 90

## 2022-01-04 PROCEDURE — 99421 OL DIG E/M SVC 5-10 MIN: CPT | Performed by: EMERGENCY MEDICINE

## 2022-01-04 NOTE — PATIENT INSTRUCTIONS
Indigo,    Based on your responses, you may have coronavirus (COVID-19). This illness can cause fever, cough and trouble breathing. Many people get a mild case and get better on their own. Some people can get very sick.    Will I be tested for COVID-19?  We would like to test you for COVID-19 virus. I have placed orders for this test.     For all employees or close contacts (except [employer] and [employer] - see below), go to your [employer] home page and scroll down to the section that says  You have an appointment that needs to be scheduled  and click the large green button that says  Schedule Now  and follow the steps to find the next available opening.     If you are unable to complete these steps or if you cannot find any available times, please call [phone] to schedule employee testing.     [employer] employees or close contacts, please call [phone].   [place] ([employer]) employees or close contacts call [phone].    Return to work/school/ guidance:  Please let your workplace manager and staffing office know when your isolation ends.       If you receive a positive COVID-19 test result, follow the guidance of the those who are giving you the results. Usually the return to work is 10 days from symptom onset or positive test date, (or in some cases 20 days if you are immunocompromised). If your symptoms started after your positive test, the 10 days should start when your symptoms started.   o If you work at [employer], you must also be cleared by Employee Occupational Health and Safety to return to work.      If you receive a negative COVID-19 test result and did not have a high risk exposure to someone with a known positive COVID-19 test, you can return to work once you're free of fever for 24 hours without fever-reducing medication and your symptoms are improving or resolved.    If you receive a negative COVID-19 test and had a high-risk exposure to someone who has tested positive for  COVID-19 then you can return to work 14 days after your last contact with the positive individual. Follow quarantine guidance given by your doctor or public health officials.     Sign up for [email or web address].   We know it's scary to hear that you might have COVID-19. We want to track your symptoms to make sure you're okay over the next 2 weeks. Please look for an email from [email or web address]--this is a free, online program that we'll use to keep in touch. To sign up, follow the link in the email you will receive. Learn more at [email or web address]        How can I take care of myself?  Over the counter medications may help with your symptoms like congestion, cough, chills, or fever.   There are not many effective prescription treatments for early COVID-19. Hydroxychloroquine, ivermectin, and azithromycin are not effective or recommended for COVID-19.    If your symptoms started in the last 10 days, you may be able to receive a treatment with monoclonal antibodies. This treatment can lower your risk of severe illness and going to the hospital. It is given through an IV or under your skin (subcutaneous) and must be given at an infusion center. You must be 12 or older, weight at least 88 pounds, and have a positive COVID-19 test.    If you would like to sign up to be considered to receive the monoclonal antibody medicine, please complete a participation form through the [facility] here: MNRAP ([email or web address]/[email or web address]/[email or web address]/[email or web address]). You may also call the [facility] COVID-19 Public Hotline at 1-760.883.3647 (open Mon-Fri: 9am-7pm and Sat: 10am-6pm).     Not all people who are eligible will receive the medicine, since supply is limited. You will be contacted in the next 1 to 2 business days only if you are selected. If you do not receive a call, you have not been selected to receive the medicine. If you have any questions about this medication, please contact your primary care  provider. For more information, see [email or web address]/diseases/[email or web address]/[email or web address]      Get lots of rest. Drink extra fluids (unless a doctor has told you not to)    Take Tylenol (acetaminophen) or ibuprofen for fever or pain. If you have liver or kidney problems, ask your family doctor if it's okay to take Tylenol o ibuprofen    Take over the counter medications for your symptoms, as directed by your doctor. You may also talk to your pharmacist.      If you have other health problems (like cancer, heart failure, an organ transplant or severe kidney disease): Call your specialty clinic if you don't feel better in the next 2 days.    Know when to call 911. Emergency warning signs include:  o Trouble breathing or shortness of breath  o Pain or pressure in the chest that doesn't go away  o Feeling confused like you haven't felt before, or not being able to wake up  o Bluish-colored lips or face    Where can I get more information?    [facility] - About COVID-19: [email or web address]     CDC - What to Do If You're Sick:     www.cdc.gov/coronavirus/2019-ncov/about/steps-when-sick.html    CDC - Ending Home Isolation:  https://www.cdc.gov/coronavirus/2019-ncov/your-health/quarantine-isolation.html     CDC - Caring for Someone:  www.cdc.gov/coronavirus/2019-ncov/if-you-are-sick/care-for-someone.html    [facility] clinical trials (COVID-19 research studies): clinicalaffairs.[facility]/n-clinical-trials    Below are the COVID-19 hotlines at the [facility] of Health ([facility]). Interpreters are available.  o For health questions: Call 551-707-1911 or 1-562.855.4333 (7 a.m. to 7 p.m.)  o For questions about schools and childcare: Call 184-880-7595 or 1-859.500.4330 (7 a.m. to 7 p.m.)

## 2022-01-05 LAB — SARS-COV-2 RNA RESP QL NAA+PROBE: NORMAL

## 2022-01-06 LAB — SARS-COV-2 RNA RESP QL NAA+PROBE: NOT DETECTED

## 2022-02-02 PROCEDURE — U0005 INFEC AGEN DETEC AMPLI PROBE: HCPCS | Performed by: INTERNAL MEDICINE

## 2022-02-03 ENCOUNTER — LAB REQUISITION (OUTPATIENT)
Dept: LAB | Facility: CLINIC | Age: 32
End: 2022-02-03

## 2022-02-03 LAB — SARS-COV-2 RNA RESP QL NAA+PROBE: POSITIVE

## 2022-02-27 ENCOUNTER — HEALTH MAINTENANCE LETTER (OUTPATIENT)
Age: 32
End: 2022-02-27

## 2022-03-15 ENCOUNTER — VIRTUAL VISIT (OUTPATIENT)
Dept: FAMILY MEDICINE | Facility: CLINIC | Age: 32
End: 2022-03-15
Payer: COMMERCIAL

## 2022-03-15 DIAGNOSIS — F32.0 CURRENT MILD EPISODE OF MAJOR DEPRESSIVE DISORDER WITHOUT PRIOR EPISODE (H): Primary | ICD-10-CM

## 2022-03-15 PROCEDURE — 96127 BRIEF EMOTIONAL/BEHAV ASSMT: CPT | Performed by: FAMILY MEDICINE

## 2022-03-15 PROCEDURE — 99213 OFFICE O/P EST LOW 20 MIN: CPT | Mod: TEL | Performed by: FAMILY MEDICINE

## 2022-03-15 ASSESSMENT — ANXIETY QUESTIONNAIRES
GAD7 TOTAL SCORE: 0
1. FEELING NERVOUS, ANXIOUS, OR ON EDGE: NOT AT ALL
5. BEING SO RESTLESS THAT IT IS HARD TO SIT STILL: NOT AT ALL
3. WORRYING TOO MUCH ABOUT DIFFERENT THINGS: NOT AT ALL
6. BECOMING EASILY ANNOYED OR IRRITABLE: NOT AT ALL
IF YOU CHECKED OFF ANY PROBLEMS ON THIS QUESTIONNAIRE, HOW DIFFICULT HAVE THESE PROBLEMS MADE IT FOR YOU TO DO YOUR WORK, TAKE CARE OF THINGS AT HOME, OR GET ALONG WITH OTHER PEOPLE: NOT DIFFICULT AT ALL
7. FEELING AFRAID AS IF SOMETHING AWFUL MIGHT HAPPEN: NOT AT ALL
2. NOT BEING ABLE TO STOP OR CONTROL WORRYING: NOT AT ALL

## 2022-03-15 ASSESSMENT — PATIENT HEALTH QUESTIONNAIRE - PHQ9
SUM OF ALL RESPONSES TO PHQ QUESTIONS 1-9: 0
5. POOR APPETITE OR OVEREATING: NOT AT ALL

## 2022-03-15 NOTE — PROGRESS NOTES
Indigo is a 31 year old who is being evaluated via a billable video visit, switched to phone visit.    How would you like to obtain your AVS? MyChart  If the video visit is dropped, the invitation should be resent by: Text to cell phone: [phone]  Will anyone else be joining your video visit? No  Video Start Time: 1:52 PM    Assessment & Plan     Current mild episode of major depressive disorder without prior episode (H)  Very well controlled, planning for another pregnancy and would like to stay on the medication until after post-partum for the next one.  - sertraline (ZOLOFT) 50 MG tablet; Take 1 tablet (50 mg) by mouth daily  - DEPRESSION ACTION PLAN (DAP)       See Patient Instructions    No follow-ups on file.    Rcoco Weston MD  [facility]    Yamil Sood is a 31 year old who presents for the following health issues     HPI     Depression Followup    How are you doing with your depression since your last visit? Improved since last visit.    Are you having other symptoms that might be associated with depression? No    Have you had a significant life event?  No     Are you feeling anxious or having panic attacks?   No    Do you have any concerns with your use of alcohol or other drugs? No     Sertraline 50mg for about a year now.    Had post partum depression and is trying for baby number two    Therapy: not in the past.    Social History     Tobacco Use     Smoking status: Never Smoker     Smokeless tobacco: Never Used     Tobacco comment: parents smoke   Vaping Use     Vaping Use: Never used   Substance Use Topics     Alcohol use: Not Currently     Comment: rare-quit with pregnancy     Drug use: No     PHQ 1/15/2021 2/15/2021 4/15/2021   PHQ-9 Total Score 1 7 2   Q9: Thoughts of better off dead/self-harm past 2 weeks Not at all Not at all Not at all     MARCO A-7 SCORE 1/15/2021 2/15/2021 4/15/2021   Total Score - 10 (moderate anxiety) -   Total Score 0 10 0     Last PHQ-9  3/15/2022   1.  Little interest or pleasure in doing things 0   2.  Feeling down, depressed, or hopeless 0   3.  Trouble falling or staying asleep, or sleeping too much 0   4.  Feeling tired or having little energy 0   5.  Poor appetite or overeating 0   6.  Feeling bad about yourself 0   7.  Trouble concentrating 0   8.  Moving slowly or restless 0   Q9: Thoughts of better off dead/self-harm past 2 weeks 0   PHQ-9 Total Score 0   Difficulty at work, home, or with people Not difficult at all     MARCO A-7  3/15/2022   1. Feeling nervous, anxious, or on edge 0   2. Not being able to stop or control worrying 0   3. Worrying too much about different things 0   4. Trouble relaxing 0   5. Being so restless that it is hard to sit still 0   6. Becoming easily annoyed or irritable 0   7. Feeling afraid, as if something awful might happen 0   MARCO A-7 Total Score 0   If you checked any problems, how difficult have they made it for you to do your work, take care of things at home, or get along with other people? Not difficult at all       Suicide Assessment Five-step Evaluation and Treatment (SAFE-T)        How many servings of fruits and vegetables do you eat daily?  2-3    On average, how many sweetened beverages do you drink each day (Examples: soda, juice, sweet tea, etc.  Do NOT count diet or artificially sweetened beverages)?   0    How many days per week do you exercise enough to make your heart beat faster? 5    How many minutes a day do you exercise enough to make your heart beat faster? 30 - 60    How many days per week do you miss taking your medication? Yes- Sometimes in the mornings. About once a week.      Review of Systems   Constitutional, HEENT, cardiovascular, pulmonary, gi and gu systems are negative, except as otherwise noted.      Objective           Vitals:  No vitals were obtained today due to virtual visit.    Physical Exam   GENERAL: Healthy, alert and no distress  RESP: No audible wheeze, cough,   PSYCH:  Mentation appears normal, affect normal/bright, judgement and insight intact, normal speech           Total of 8 minutes was spent on phone visit with patient. Options for treatment and/or follow-up care were reviewed with the patient. Patient was engaged and actively involved in the decision making process. She verbalized understanding of the options discussed and was satisfied with the final plan.

## 2022-03-16 ASSESSMENT — ANXIETY QUESTIONNAIRES: GAD7 TOTAL SCORE: 0

## 2022-04-05 ENCOUNTER — PRENATAL OFFICE VISIT (OUTPATIENT)
Dept: OBGYN | Facility: CLINIC | Age: 32
End: 2022-04-05
Payer: COMMERCIAL

## 2022-04-05 ENCOUNTER — APPOINTMENT (OUTPATIENT)
Dept: OBGYN | Facility: CLINIC | Age: 32
End: 2022-04-05
Payer: COMMERCIAL

## 2022-04-05 DIAGNOSIS — Z34.80 PRENATAL CARE, SUBSEQUENT PREGNANCY: ICD-10-CM

## 2022-04-05 PROCEDURE — 99207 PR NO CHARGE NURSE ONLY: CPT | Performed by: OBSTETRICS & GYNECOLOGY

## 2022-04-05 NOTE — PROGRESS NOTES
Denied need for review of teaching  [facility] OB Intake Nurse    Patient supplied answers from flow sheet for:  Prenatal OB Questionnaire.  Past Medical History  Have you ever recieved care for your mental health? : (!) Yes (postpartum depression)  Have you ever been in a major accident or suffered serious trauma?: No  Within the last year, has anyone hit, slapped, kicked or otherwise hurt you?: No  In the last year, has anyone forced you to have sex when you didn't want to?: No    Past Medical History 2   Have you ever received a blood transfusion?: No  Would you accept a blood transfusion if was medically recommended?: Yes  Does anyone in your home smoke?: No   Is your blood type Rh negative?: (!) Yes  Have you ever ?: (!) Yes  Have you been hospitalized for a nonsurgical reason excluding normal delivery?: No  Have you ever had an abnormal pap smear?: No    Past Medical History (Continued)  Do you have a history of abnormalities of the uterus?: No  Did your mother take [employer] or any other hormones when she was pregnant with you?: No  Do you have any other problems we have not asked about which you feel may be important to this pregnancy?: No

## 2022-05-05 NOTE — PROGRESS NOTES
"Indigo is a 32 year old  @ 10.1 weeks here for new ob visit.        ROS: Ten point review of systems was reviewed and negative except the above.  Current Issues include: fatigue    OBhx:  x 1    Past Medical History:   Diagnosis Date     Post partum depression      Past Surgical History:   Procedure Laterality Date     HC TOOTH EXTRACTION W/FORCEP      wisdom teeth     SURGICAL HISTORY OF -       Bone-patellar-bone left knee anterior cruciate ligament reconstruction     Patient Active Problem List    Diagnosis Date Noted     Prenatal care, subsequent pregnancy 2022     Priority: Medium     Rh negative state in antepartum period 2020     Priority: Medium     Vestibular dysfunction, unspecified laterality 2019     Priority: Medium     Normal MRI.  Saw [facility] 19 and referred to ENT       Family history of nephrolithiasis 2014     Priority: Medium     Dysmenorrhea 10/19/2012     Priority: Medium     Better with oral contraceptive pills       Recurrent UTI 10/19/2012     Priority: Medium     CARDIOVASCULAR SCREENING; LDL GOAL LESS THAN 160 2012     Priority: Low        Allergies   Allergen Reactions     Pcn [Penicillins] Rash     Zithromax [Azithromycin Dihydrate] Rash     clindamycin (CLINDAMAX) 1 % external gel, Apply topically 2 times daily  Prenatal Vit-Fe Fumarate-FA (PRENATAL PO),   sertraline (ZOLOFT) 50 MG tablet, Take 1 tablet (50 mg) by mouth daily    No current facility-administered medications on file prior to visit.    FH: Her family history was reviewed and documented in its appropriate chart area.    Past Medical History of Father of Baby:   No significant medical history    Physical Exam: /70 (BP Location: Left arm, Patient Position: Chair, Cuff Size: Adult Regular)   Pulse 84   Temp 99.1  F (37.3  C) (Tympanic)   Resp 16   Ht 1.651 m (5' 5\")   Wt 85.2 kg (187 lb 12.8 oz)   LMP 2022   BMI 31.25 kg/m    General: Well " developed, well nourished female  Skin: Normal  HEENT: Normal   Abdomen: Benign and Soft, flat, non-tender   Extremities: Normal  Neurological: Normal   Perineum: Normal   Vulva: Normal     Transvaginal ultrasound was performed.  A viable intrauterine pregnancy was seen.  CRL consistent with 10 weeks, 1 days.  Fetal heart motion was visualized.    EDC by LMP: 22   EDC by sono:  22  Final EDC: 22    A/P 32 year old  at  10.1 weeks    1. Discussed physician coverage, tertiary support, diet, exercise, weight gain, schedule of visits, routine and indicated ultrasounds, and childbirth education.    2. Options for  testing for chromosomal and birth defects were discussed with the patient including nuchal lucency/blood marker testing in the first trimester and quad screening and/or Level 2 ultrasound in the second trimester.  We discussed that these are screening tests and not diagnostic tests and that false positives and negatives are a distinct possibility.  We discussed that follow up diagnostic testing would include chorionic villus sampling or amniocentesis depending on gestational age.    Also discussed the option of NIPT, which is less invasive, more accurate, but not always covered by insurance.      3. Prenatal labs, GC, Chlamydia    4. Prenatal Vitamins      Evelin Fonseca M.D.

## 2022-05-06 ENCOUNTER — PRENATAL OFFICE VISIT (OUTPATIENT)
Dept: OBGYN | Facility: CLINIC | Age: 32
End: 2022-05-06
Payer: COMMERCIAL

## 2022-05-06 VITALS
SYSTOLIC BLOOD PRESSURE: 110 MMHG | BODY MASS INDEX: 31.29 KG/M2 | WEIGHT: 187.8 LBS | RESPIRATION RATE: 16 BRPM | HEART RATE: 84 BPM | DIASTOLIC BLOOD PRESSURE: 70 MMHG | HEIGHT: 65 IN | TEMPERATURE: 99.1 F

## 2022-05-06 DIAGNOSIS — Z34.80 PRENATAL CARE OF MULTIGRAVIDA, ANTEPARTUM: Primary | ICD-10-CM

## 2022-05-06 LAB
ABO/RH(D): NORMAL
ALBUMIN UR-MCNC: NEGATIVE MG/DL
ANTIBODY SCREEN: NEGATIVE
APPEARANCE UR: CLEAR
BILIRUB UR QL STRIP: NEGATIVE
COLOR UR AUTO: YELLOW
ERYTHROCYTE [DISTWIDTH] IN BLOOD BY AUTOMATED COUNT: 13.1 % (ref 10–15)
GLUCOSE UR STRIP-MCNC: NEGATIVE MG/DL
HCT VFR BLD AUTO: 39.8 % (ref 35–47)
HGB BLD-MCNC: 13 G/DL (ref 11.7–15.7)
HGB UR QL STRIP: NEGATIVE
KETONES UR STRIP-MCNC: NEGATIVE MG/DL
LEUKOCYTE ESTERASE UR QL STRIP: NEGATIVE
MCH RBC QN AUTO: 29.6 PG (ref 26.5–33)
MCHC RBC AUTO-ENTMCNC: 32.7 G/DL (ref 31.5–36.5)
MCV RBC AUTO: 91 FL (ref 78–100)
NITRATE UR QL: NEGATIVE
PH UR STRIP: 6 [PH] (ref 5–7)
PLATELET # BLD AUTO: 204 10E3/UL (ref 150–450)
RBC # BLD AUTO: 4.39 10E6/UL (ref 3.8–5.2)
RBC #/AREA URNS AUTO: NORMAL /HPF
SP GR UR STRIP: >=1.03 (ref 1–1.03)
SPECIMEN EXPIRATION DATE: NORMAL
UROBILINOGEN UR STRIP-ACNC: 0.2 E.U./DL
WBC # BLD AUTO: 7.4 10E3/UL (ref 4–11)
WBC #/AREA URNS AUTO: NORMAL /HPF

## 2022-05-06 PROCEDURE — 86901 BLOOD TYPING SEROLOGIC RH(D): CPT | Performed by: OBSTETRICS & GYNECOLOGY

## 2022-05-06 PROCEDURE — 36415 COLL VENOUS BLD VENIPUNCTURE: CPT | Performed by: OBSTETRICS & GYNECOLOGY

## 2022-05-06 PROCEDURE — 86803 HEPATITIS C AB TEST: CPT | Performed by: OBSTETRICS & GYNECOLOGY

## 2022-05-06 PROCEDURE — 87086 URINE CULTURE/COLONY COUNT: CPT | Performed by: OBSTETRICS & GYNECOLOGY

## 2022-05-06 PROCEDURE — 86780 TREPONEMA PALLIDUM: CPT | Performed by: OBSTETRICS & GYNECOLOGY

## 2022-05-06 PROCEDURE — 76817 TRANSVAGINAL US OBSTETRIC: CPT | Performed by: OBSTETRICS & GYNECOLOGY

## 2022-05-06 PROCEDURE — 86762 RUBELLA ANTIBODY: CPT | Performed by: OBSTETRICS & GYNECOLOGY

## 2022-05-06 PROCEDURE — 86900 BLOOD TYPING SEROLOGIC ABO: CPT | Performed by: OBSTETRICS & GYNECOLOGY

## 2022-05-06 PROCEDURE — 81001 URINALYSIS AUTO W/SCOPE: CPT | Performed by: OBSTETRICS & GYNECOLOGY

## 2022-05-06 PROCEDURE — 87389 HIV-1 AG W/HIV-1&-2 AB AG IA: CPT | Performed by: OBSTETRICS & GYNECOLOGY

## 2022-05-06 PROCEDURE — 87340 HEPATITIS B SURFACE AG IA: CPT | Performed by: OBSTETRICS & GYNECOLOGY

## 2022-05-06 PROCEDURE — 99207 PR FIRST OB VISIT: CPT | Performed by: OBSTETRICS & GYNECOLOGY

## 2022-05-06 PROCEDURE — 85027 COMPLETE CBC AUTOMATED: CPT | Performed by: OBSTETRICS & GYNECOLOGY

## 2022-05-06 PROCEDURE — 87591 N.GONORRHOEAE DNA AMP PROB: CPT | Performed by: OBSTETRICS & GYNECOLOGY

## 2022-05-06 PROCEDURE — 86850 RBC ANTIBODY SCREEN: CPT | Performed by: OBSTETRICS & GYNECOLOGY

## 2022-05-06 PROCEDURE — 87491 CHLMYD TRACH DNA AMP PROBE: CPT | Performed by: OBSTETRICS & GYNECOLOGY

## 2022-05-06 NOTE — NURSING NOTE
"Initial /70 (BP Location: Left arm, Patient Position: Chair, Cuff Size: Adult Regular)   Pulse 84   Temp 99.1  F (37.3  C) (Tympanic)   Resp 16   Ht 1.651 m (5' 5\")   Wt 85.2 kg (187 lb 12.8 oz)   LMP 02/24/2022   BMI 31.25 kg/m   Estimated body mass index is 31.25 kg/m  as calculated from the following:    Height as of this encounter: 1.651 m (5' 5\").    Weight as of this encounter: 85.2 kg (187 lb 12.8 oz). .      "

## 2022-05-07 LAB
C TRACH DNA SPEC QL PROBE+SIG AMP: NEGATIVE
HBV SURFACE AG SERPL QL IA: NONREACTIVE
HCV AB SERPL QL IA: NONREACTIVE
HIV 1+2 AB+HIV1 P24 AG SERPL QL IA: NONREACTIVE
N GONORRHOEA DNA SPEC QL NAA+PROBE: NEGATIVE
RUBV IGG SERPL QL IA: 1.94 INDEX
RUBV IGG SERPL QL IA: POSITIVE
T PALLIDUM AB SER QL: NONREACTIVE

## 2022-05-08 LAB — BACTERIA UR CULT: NO GROWTH

## 2022-06-03 ENCOUNTER — PRENATAL OFFICE VISIT (OUTPATIENT)
Dept: OBGYN | Facility: CLINIC | Age: 32
End: 2022-06-03
Payer: COMMERCIAL

## 2022-06-03 VITALS
TEMPERATURE: 98.9 F | RESPIRATION RATE: 18 BRPM | HEIGHT: 65 IN | BODY MASS INDEX: 31.65 KG/M2 | SYSTOLIC BLOOD PRESSURE: 114 MMHG | DIASTOLIC BLOOD PRESSURE: 62 MMHG | HEART RATE: 77 BPM | WEIGHT: 190 LBS

## 2022-06-03 DIAGNOSIS — Z34.82 PRENATAL CARE, SUBSEQUENT PREGNANCY IN SECOND TRIMESTER: Primary | ICD-10-CM

## 2022-06-03 PROCEDURE — 99207 PR PRENATAL VISIT: CPT | Performed by: OBSTETRICS & GYNECOLOGY

## 2022-06-03 NOTE — NURSING NOTE
"Initial /62 (BP Location: Right arm, Patient Position: Chair, Cuff Size: Adult Regular)   Pulse 77   Temp 98.9  F (37.2  C) (Tympanic)   Resp 18   Ht 1.651 m (5' 5\")   Wt 86.2 kg (190 lb)   LMP 02/24/2022   Breastfeeding No   BMI 31.62 kg/m   Estimated body mass index is 31.62 kg/m  as calculated from the following:    Height as of this encounter: 1.651 m (5' 5\").    Weight as of this encounter: 86.2 kg (190 lb). .      "

## 2022-06-03 NOTE — PROGRESS NOTES
"CC: Here for routine prenatal visit @ 14w1d   HPI: no cramping or bleeding.  Hx of back pain--had episode of vasovagal yesterday maybe related to her back pain?    PE: /62 (BP Location: Right arm, Patient Position: Chair, Cuff Size: Adult Regular)   Pulse 77   Temp 98.9  F (37.2  C) (Tympanic)   Resp 18   Ht 1.651 m (5' 5\")   Wt 86.2 kg (190 lb)   LMP 2022   Breastfeeding No   BMI 31.62 kg/m     See OB flowsheet    Labs WNL.  Declines NIPT testing    A/P  @ 14w1d normal pregnancy    1. Routine prenatal care.  COVID restrictions and recommendations reviewed including iron supplementation.  Declines NIPT today.  Anomaly screen ordered.  Reviewed aches/pains of pregnancy.     RTC 4 weeks.      Evelin Fonseca M.D.    "

## 2022-06-10 ENCOUNTER — PRENATAL OFFICE VISIT (OUTPATIENT)
Dept: OBGYN | Facility: CLINIC | Age: 32
End: 2022-06-10
Payer: COMMERCIAL

## 2022-06-10 VITALS
HEIGHT: 65 IN | HEART RATE: 73 BPM | SYSTOLIC BLOOD PRESSURE: 109 MMHG | BODY MASS INDEX: 32.29 KG/M2 | WEIGHT: 193.8 LBS | TEMPERATURE: 97.9 F | DIASTOLIC BLOOD PRESSURE: 71 MMHG | RESPIRATION RATE: 12 BRPM

## 2022-06-10 DIAGNOSIS — O26.852 SPOTTING AFFECTING PREGNANCY IN SECOND TRIMESTER: ICD-10-CM

## 2022-06-10 DIAGNOSIS — Z67.91 RH NEGATIVE STATE IN ANTEPARTUM PERIOD: Primary | ICD-10-CM

## 2022-06-10 DIAGNOSIS — O26.899 RH NEGATIVE STATE IN ANTEPARTUM PERIOD: Primary | ICD-10-CM

## 2022-06-10 PROCEDURE — 99213 OFFICE O/P EST LOW 20 MIN: CPT | Mod: 25 | Performed by: OBSTETRICS & GYNECOLOGY

## 2022-06-10 PROCEDURE — 96372 THER/PROPH/DIAG INJ SC/IM: CPT | Performed by: OBSTETRICS & GYNECOLOGY

## 2022-06-10 NOTE — PROGRESS NOTES
"CC: Here for routine prenatal visit @ 15w1d   HPI: Had bright red spotting episode.  No recent intercourse.  Has mostly resolved.      PE: /71 (BP Location: Left arm, Patient Position: Sitting, Cuff Size: Adult Regular)   Pulse 73   Temp 97.9  F (36.6  C) (Tympanic)   Resp 12   Ht 1.651 m (5' 5\")   Wt 87.9 kg (193 lb 12.8 oz)   LMP 2022   BMI 32.25 kg/m     See OB flowsheet    BSUS + FHT/FM, placenta anterior and close to cervix but does not appear to be a previa    A/P  @ 15w1d spotting in pregnancy    1. Routine prenatal care.  COVID restrictions and recommendations reviewed including iron supplementation.  Ultrasound reassuring.  Patient reassured.  Will give Rhogam today and @ 28 weeks    RTC as scheduled.      Evelin Fonseca M.D.    "
Clinic Administered Medication Documentation    Administrations This Visit     rho(D) immune globulin (RHOGAM) injection 300 mcg     Admin Date  06/10/2022 Action  Given Dose  300 mcg Route  Intramuscular Site  Right Ventrogluteal Administered By  Dex Yu MA    Ordering Provider: Evelin Fonseca MD    Patient Supplied?: No                  Injectable Medication Documentation    Patient was given Rhogam. Prior to medication administration, verified patients identity using patient s name and date of birth. Please see MAR and medication order for additional information. Patient instructed to remain in clinic for 15 minutes and report any adverse reaction to staff immediately .      Was entire vial of medication used? Yes  Vial/Syringe: Single dose vial  Expiration Date:  10/15/2023  Was this medication supplied by the patient? No    
home

## 2022-06-10 NOTE — NURSING NOTE
"Initial /71 (BP Location: Left arm, Patient Position: Sitting, Cuff Size: Adult Regular)   Pulse 73   Temp 97.9  F (36.6  C) (Tympanic)   Resp 12   Ht 1.651 m (5' 5\")   Wt 87.9 kg (193 lb 12.8 oz)   LMP 02/24/2022   BMI 32.25 kg/m   Estimated body mass index is 32.25 kg/m  as calculated from the following:    Height as of this encounter: 1.651 m (5' 5\").    Weight as of this encounter: 87.9 kg (193 lb 12.8 oz). .      "

## 2022-07-12 ENCOUNTER — HOSPITAL ENCOUNTER (OUTPATIENT)
Dept: ULTRASOUND IMAGING | Facility: CLINIC | Age: 32
Discharge: HOME | End: 2022-07-12
Attending: OBSTETRICS & GYNECOLOGY | Admitting: OBSTETRICS & GYNECOLOGY
Payer: COMMERCIAL

## 2022-07-12 DIAGNOSIS — Z34.82 PRENATAL CARE, SUBSEQUENT PREGNANCY IN SECOND TRIMESTER: ICD-10-CM

## 2022-07-12 PROCEDURE — 76805 OB US >/= 14 WKS SNGL FETUS: CPT

## 2022-07-14 ENCOUNTER — PRENATAL OFFICE VISIT (OUTPATIENT)
Dept: OBGYN | Facility: CLINIC | Age: 32
End: 2022-07-14
Payer: COMMERCIAL

## 2022-07-14 VITALS
WEIGHT: 190.6 LBS | HEART RATE: 79 BPM | DIASTOLIC BLOOD PRESSURE: 68 MMHG | SYSTOLIC BLOOD PRESSURE: 111 MMHG | BODY MASS INDEX: 31.75 KG/M2 | HEIGHT: 65 IN | RESPIRATION RATE: 16 BRPM

## 2022-07-14 DIAGNOSIS — Z34.82 PRENATAL CARE, SUBSEQUENT PREGNANCY IN SECOND TRIMESTER: Primary | ICD-10-CM

## 2022-07-14 PROCEDURE — 99207 PR PRENATAL VISIT: CPT | Performed by: OBSTETRICS & GYNECOLOGY

## 2022-07-14 NOTE — PROGRESS NOTES
"CC: Here for routine prenatal visit @ 20w0d   HPI: + FM, no ctx, no LOF, no VB.  No complaints.     PE: /68 (BP Location: Right arm, Patient Position: Chair, Cuff Size: Adult Regular)   Pulse 79   Resp 16   Ht 1.651 m (5' 5\")   Wt 86.5 kg (190 lb 9.6 oz)   LMP 2022   Breastfeeding No   BMI 31.72 kg/m     See OB flowsheet    U/S WNL    A/P  @ 20w0d normal pregnancy    1. Routine prenatal care.  COVID restrictions and recommendations reviewed including iron supplementation.     RTC 4 weeks.      Evelin Fonseca M.D.    "

## 2022-08-15 ENCOUNTER — PRENATAL OFFICE VISIT (OUTPATIENT)
Dept: OBGYN | Facility: CLINIC | Age: 32
End: 2022-08-15
Payer: COMMERCIAL

## 2022-08-15 VITALS
DIASTOLIC BLOOD PRESSURE: 65 MMHG | TEMPERATURE: 98.4 F | WEIGHT: 200 LBS | SYSTOLIC BLOOD PRESSURE: 113 MMHG | RESPIRATION RATE: 16 BRPM | BODY MASS INDEX: 33.32 KG/M2 | HEIGHT: 65 IN | HEART RATE: 77 BPM

## 2022-08-15 DIAGNOSIS — O26.899 RH NEGATIVE STATE IN ANTEPARTUM PERIOD: ICD-10-CM

## 2022-08-15 DIAGNOSIS — Z34.82 PRENATAL CARE, SUBSEQUENT PREGNANCY IN SECOND TRIMESTER: Primary | ICD-10-CM

## 2022-08-15 DIAGNOSIS — Z67.91 RH NEGATIVE STATE IN ANTEPARTUM PERIOD: ICD-10-CM

## 2022-08-15 LAB
ERYTHROCYTE [DISTWIDTH] IN BLOOD BY AUTOMATED COUNT: 13.5 % (ref 10–15)
GLUCOSE 1H P 50 G GLC PO SERPL-MCNC: 125 MG/DL (ref 70–129)
HCT VFR BLD AUTO: 35.4 % (ref 35–47)
HGB BLD-MCNC: 11.6 G/DL (ref 11.7–15.7)
MCH RBC QN AUTO: 30.3 PG (ref 26.5–33)
MCHC RBC AUTO-ENTMCNC: 32.8 G/DL (ref 31.5–36.5)
MCV RBC AUTO: 92 FL (ref 78–100)
PLATELET # BLD AUTO: 224 10E3/UL (ref 150–450)
RBC # BLD AUTO: 3.83 10E6/UL (ref 3.8–5.2)
WBC # BLD AUTO: 8.5 10E3/UL (ref 4–11)

## 2022-08-15 PROCEDURE — 86780 TREPONEMA PALLIDUM: CPT | Performed by: OBSTETRICS & GYNECOLOGY

## 2022-08-15 PROCEDURE — 99207 PR PRENATAL VISIT: CPT | Performed by: OBSTETRICS & GYNECOLOGY

## 2022-08-15 PROCEDURE — 85027 COMPLETE CBC AUTOMATED: CPT | Performed by: OBSTETRICS & GYNECOLOGY

## 2022-08-15 PROCEDURE — 36415 COLL VENOUS BLD VENIPUNCTURE: CPT | Performed by: OBSTETRICS & GYNECOLOGY

## 2022-08-15 PROCEDURE — 82950 GLUCOSE TEST: CPT | Performed by: OBSTETRICS & GYNECOLOGY

## 2022-08-15 NOTE — PROGRESS NOTES
"[facility]    Return OB Note    CC: Return OB     Subjective:  Indigo is a 32 year old  at 24w4d   Denies vaginal bleeding, loss of fluid, or regular contractions. Good fetal movement.  Complaints today: None    Objective:  /65 (BP Location: Right arm, Patient Position: Chair, Cuff Size: Adult Regular)   Pulse 77   Temp 98.4  F (36.9  C) (Tympanic)   Resp 16   Ht 1.651 m (5' 5\")   Wt 90.7 kg (200 lb)   LMP 2022   BMI 33.28 kg/m      Fundal height: 24cm  FHT: 145bpm      Assessment/Plan:   Encounter Diagnoses   Name Primary?     Prenatal care, subsequent pregnancy in second trimester Yes     Rh negative state in antepartum period        IUP at 24w4d  -Rh negative: Rhogam at 28 weeks, received in early pregnancy due to spotting  -Anatomy US normal  -Mid trimester labs today  -Strict return precautions given    RTC 4 weeks    Jessica Thacker DO      "

## 2022-08-15 NOTE — NURSING NOTE
"Initial /65 (BP Location: Right arm, Patient Position: Chair, Cuff Size: Adult Regular)   Pulse 77   Temp 98.4  F (36.9  C) (Tympanic)   Resp 16   Ht 1.651 m (5' 5\")   Wt 90.7 kg (200 lb)   LMP 02/24/2022   BMI 33.28 kg/m   Estimated body mass index is 33.28 kg/m  as calculated from the following:    Height as of this encounter: 1.651 m (5' 5\").    Weight as of this encounter: 90.7 kg (200 lb). .      "

## 2022-08-16 LAB — T PALLIDUM AB SER QL: NONREACTIVE

## 2022-09-07 ENCOUNTER — PRENATAL OFFICE VISIT (OUTPATIENT)
Dept: OBGYN | Facility: CLINIC | Age: 32
End: 2022-09-07
Payer: COMMERCIAL

## 2022-09-07 VITALS
RESPIRATION RATE: 16 BRPM | HEIGHT: 65 IN | WEIGHT: 204 LBS | TEMPERATURE: 97.3 F | SYSTOLIC BLOOD PRESSURE: 114 MMHG | HEART RATE: 85 BPM | BODY MASS INDEX: 33.99 KG/M2 | DIASTOLIC BLOOD PRESSURE: 65 MMHG

## 2022-09-07 DIAGNOSIS — Z34.83 PRENATAL CARE, SUBSEQUENT PREGNANCY IN THIRD TRIMESTER: ICD-10-CM

## 2022-09-07 DIAGNOSIS — O26.899 RH NEGATIVE STATE IN ANTEPARTUM PERIOD: Primary | ICD-10-CM

## 2022-09-07 DIAGNOSIS — Z67.91 RH NEGATIVE STATE IN ANTEPARTUM PERIOD: Primary | ICD-10-CM

## 2022-09-07 PROCEDURE — 96372 THER/PROPH/DIAG INJ SC/IM: CPT | Performed by: OBSTETRICS & GYNECOLOGY

## 2022-09-07 PROCEDURE — 90715 TDAP VACCINE 7 YRS/> IM: CPT | Performed by: OBSTETRICS & GYNECOLOGY

## 2022-09-07 PROCEDURE — 99207 PR PRENATAL VISIT: CPT | Performed by: OBSTETRICS & GYNECOLOGY

## 2022-09-07 PROCEDURE — 90471 IMMUNIZATION ADMIN: CPT | Performed by: OBSTETRICS & GYNECOLOGY

## 2022-09-07 NOTE — PROGRESS NOTES
Concerns:   No vaginal bleeding, no contractions, no leakage of fluid  No nausea/vomiting. No heartburn  No vaginal discharge. No dysuria.   No headache, vision changes, lower extremity swelling, upper abdominal pain, chest pain, shortness of breath  Tdap planned next visit    Discussed PTL, PROM, and when to call or come in.  Normal anatomy ultrasound.  RTC 2 weeks.        Nasim Rodriguez MD

## 2022-09-07 NOTE — PROGRESS NOTES
Clinic Administered Medication Documentation    Administrations This Visit     rho(D) immune globulin (RHOGAM) injection 300 mcg     Admin Date  09/07/2022 Action  Given Dose  300 mcg Route  Intramuscular Site  Right Ventrogluteal Administered By  Carrie Childs CMA    Ordering Provider: Nasim Rodriguez MD    NDC: 9133-5467-55    Lot#: [id number]    : Seculert    Patient Supplied?: No                  Injectable Medication Documentation    Patient was given Rhogam. Prior to medication administration, verified patients identity using patient s name and date of birth. Please see MAR and medication order for additional information. Patient instructed to remain in clinic for 15 minutes.      Was entire vial of medication used? Yes  Vial/Syringe: Single dose vial  Expiration Date:  10/15/23  Was this medication supplied by the patient? No      Prior to immunization administration, verified patients identity using patient s name and date of birth. Please see Immunization Activity for additional information.     Screening Questionnaire for Adult Immunization    Are you sick today?   No   Do you have allergies to medications, food, a vaccine component or latex?   No   Have you ever had a serious reaction after receiving a vaccination?   No   Do you have a long-term health problem with heart, lung, kidney, or metabolic disease (e.g., diabetes), asthma, a blood disorder, no spleen, complement component deficiency, a cochlear implant, or a spinal fluid leak?  Are you on long-term aspirin therapy?   No   Do you have cancer, leukemia, HIV/AIDS, or any other immune system problem?   No   Do you have a parent, brother, or sister with an immune system problem?   No   In the past 3 months, have you taken medications that affect  your immune system, such as prednisone, other steroids, or anticancer drugs; drugs for the treatment of rheumatoid arthritis, Crohn s disease, or psoriasis; or have you had  radiation treatments?   No   Have you had a seizure, or a brain or other nervous system problem?   No   During the past year, have you received a transfusion of blood or blood    products, or been given immune (gamma) globulin or antiviral drug?   No   For women: Are you pregnant or is there a chance you could become       pregnant during the next month?   Current   Have you received any vaccinations in the past 4 weeks?   No          Per orders of Dr. Rodriguez, injection of tdap and rhogam given by Carrie Childs CMA. Patient instructed to remain in clinic for 15 minutes afterwards, and to report any adverse reaction to me immediately.       Screening performed by Carrie Childs CMA on 9/7/2022 at 9:21 AM.

## 2022-09-17 ENCOUNTER — E-VISIT (OUTPATIENT)
Dept: FAMILY MEDICINE | Facility: CLINIC | Age: 32
End: 2022-09-17
Payer: COMMERCIAL

## 2022-09-17 DIAGNOSIS — M54.40 BILATERAL LOW BACK PAIN WITH SCIATICA, SCIATICA LATERALITY UNSPECIFIED, UNSPECIFIED CHRONICITY: Primary | ICD-10-CM

## 2022-09-17 PROCEDURE — 99207 PR NON-BILLABLE SERV PER CHARTING: CPT | Performed by: FAMILY MEDICINE

## 2022-09-19 ENCOUNTER — HOSPITAL ENCOUNTER (EMERGENCY)
Facility: CLINIC | Age: 32
Discharge: ANOTHER HOSPITAL | End: 2022-09-19
Attending: FAMILY MEDICINE | Admitting: FAMILY MEDICINE
Payer: COMMERCIAL

## 2022-09-19 ENCOUNTER — HOSPITAL ENCOUNTER (OUTPATIENT)
Facility: CLINIC | Age: 32
Setting detail: OBSERVATION
Discharge: HOME | End: 2022-09-22
Attending: OBSTETRICS & GYNECOLOGY | Admitting: NEUROLOGICAL SURGERY
Payer: COMMERCIAL

## 2022-09-19 ENCOUNTER — APPOINTMENT (OUTPATIENT)
Dept: MRI IMAGING | Facility: CLINIC | Age: 32
End: 2022-09-19
Attending: FAMILY MEDICINE
Payer: COMMERCIAL

## 2022-09-19 VITALS
BODY MASS INDEX: 33.95 KG/M2 | OXYGEN SATURATION: 97 % | WEIGHT: 204 LBS | HEART RATE: 99 BPM | DIASTOLIC BLOOD PRESSURE: 78 MMHG | RESPIRATION RATE: 18 BRPM | TEMPERATURE: 97.3 F | SYSTOLIC BLOOD PRESSURE: 118 MMHG

## 2022-09-19 DIAGNOSIS — M54.17 LUMBOSACRAL RADICULOPATHY AT L5: ICD-10-CM

## 2022-09-19 DIAGNOSIS — Z33.1 PREGNANT STATE, INCIDENTAL: ICD-10-CM

## 2022-09-19 DIAGNOSIS — M51.26 LUMBAR DISC HERNIATION: Primary | ICD-10-CM

## 2022-09-19 PROCEDURE — 120N000002 HC R&B MED SURG/OB UMMC

## 2022-09-19 PROCEDURE — 99285 EMERGENCY DEPT VISIT HI MDM: CPT | Performed by: FAMILY MEDICINE

## 2022-09-19 PROCEDURE — 99221 1ST HOSP IP/OBS SF/LOW 40: CPT | Performed by: OBSTETRICS & GYNECOLOGY

## 2022-09-19 PROCEDURE — 51798 US URINE CAPACITY MEASURE: CPT

## 2022-09-19 PROCEDURE — 250N000013 HC RX MED GY IP 250 OP 250 PS 637: Performed by: FAMILY MEDICINE

## 2022-09-19 PROCEDURE — 72148 MRI LUMBAR SPINE W/O DYE: CPT

## 2022-09-19 PROCEDURE — 99285 EMERGENCY DEPT VISIT HI MDM: CPT | Mod: 25

## 2022-09-19 RX ORDER — SODIUM CHLORIDE, SODIUM LACTATE, POTASSIUM CHLORIDE, CALCIUM CHLORIDE 600; 310; 30; 20 MG/100ML; MG/100ML; MG/100ML; MG/100ML
INJECTION, SOLUTION INTRAVENOUS CONTINUOUS
Status: DISCONTINUED | OUTPATIENT
Start: 2022-09-20 | End: 2022-09-22

## 2022-09-19 RX ORDER — SODIUM CHLORIDE 9 MG/ML
INJECTION, SOLUTION INTRAVENOUS CONTINUOUS
Status: DISCONTINUED | OUTPATIENT
Start: 2022-09-20 | End: 2022-09-19

## 2022-09-19 RX ORDER — BETAMETHASONE SODIUM PHOSPHATE AND BETAMETHASONE ACETATE 3; 3 MG/ML; MG/ML
12 INJECTION, SUSPENSION INTRA-ARTICULAR; INTRALESIONAL; INTRAMUSCULAR; SOFT TISSUE EVERY 24 HOURS
Status: DISCONTINUED | OUTPATIENT
Start: 2022-09-19 | End: 2022-09-20

## 2022-09-19 RX ORDER — PRENATAL VIT/IRON FUM/FOLIC AC 27MG-0.8MG
1 TABLET ORAL DAILY
Status: DISCONTINUED | OUTPATIENT
Start: 2022-09-20 | End: 2022-09-22 | Stop reason: HOSPADM

## 2022-09-19 RX ORDER — SERTRALINE HYDROCHLORIDE 25 MG/1
50 TABLET, FILM COATED ORAL DAILY
Status: DISCONTINUED | OUTPATIENT
Start: 2022-09-20 | End: 2022-09-22 | Stop reason: HOSPADM

## 2022-09-19 RX ORDER — ACETAMINOPHEN 500 MG
1000 TABLET ORAL ONCE
Status: COMPLETED | OUTPATIENT
Start: 2022-09-19 | End: 2022-09-19

## 2022-09-19 RX ADMIN — ACETAMINOPHEN 1000 MG: 500 TABLET, FILM COATED ORAL at 18:19

## 2022-09-19 ASSESSMENT — ENCOUNTER SYMPTOMS
PALPITATIONS: 0
CONSTIPATION: 0
NAUSEA: 0
VOMITING: 0
SHORTNESS OF BREATH: 0
HEADACHES: 0
FEVER: 0
FREQUENCY: 0
DYSURIA: 0
SORE THROAT: 0
SINUS PRESSURE: 0
BACK PAIN: 1
DIARRHEA: 0
WHEEZING: 0
DIAPHORESIS: 0
BLOOD IN STOOL: 0
CHILLS: 0
COUGH: 0
ABDOMINAL PAIN: 0

## 2022-09-19 ASSESSMENT — ACTIVITIES OF DAILY LIVING (ADL)
DIFFICULTY_EATING/SWALLOWING: NO
WEAR_GLASSES_OR_BLIND: NO
ADLS_ACUITY_SCORE: 35
WALKING_OR_CLIMBING_STAIRS_DIFFICULTY: NO
ADLS_ACUITY_SCORE: 35
CONCENTRATING,_REMEMBERING_OR_MAKING_DECISIONS_DIFFICULTY: NO
TOILETING_ISSUES: NO
CHANGE_IN_FUNCTIONAL_STATUS_SINCE_ONSET_OF_CURRENT_ILLNESS/INJURY: NO
DOING_ERRANDS_INDEPENDENTLY_DIFFICULTY: NO
ADLS_ACUITY_SCORE: 35
DRESSING/BATHING_DIFFICULTY: NO
FALL_HISTORY_WITHIN_LAST_SIX_MONTHS: NO
ADLS_ACUITY_SCORE: 35

## 2022-09-19 NOTE — ED TRIAGE NOTES
Low back pain with leg pain and numbness and tingling. Pt is 30 week pregnant. Normal fetal movement per report. No injury      Triage Assessment     Row Name 09/19/22 1646       Triage Assessment (Adult)    Airway WDL WDL       Respiratory WDL    Respiratory WDL WDL       Skin Circulation/Temperature WDL    Skin Circulation/Temperature WDL WDL       Cardiac WDL    Cardiac WDL WDL       Peripheral/Neurovascular WDL    Peripheral Neurovascular WDL WDL

## 2022-09-19 NOTE — ED PROVIDER NOTES
History     Chief Complaint   Patient presents with     Back Pain     Low back pain with leg pain and numbness and tingling. Pt is 30 week pregnant. Normal fetal movement per report. No injury      HPI  Indigo Lamb is a 32 year old female who presents with a history of recurrent UTI ureterolithiasis history Rh- pregnancy 30 weeks normal fetal movement.   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  She presents today because she was having approximately 2 to 3 weeks of low back pain that she has had in a prior pregnancy.  She then began to experience some radiation of the pain into the left leg and saw a friend of hers 2 days ago who is a physical therapist and found her to have some findings of SI joint dysfunction with unlevel hips.  She also had some radicular symptoms into the left leg and primarily to the level of the knee and not below.  However starting in the last several hours she has now had more moderate to severe pain in the right leg that now descends into the foot primarily down the posterior thigh but then involving nearly the entire lower leg and foot.  She has a sense of numbness coolness and paresthesias in the lower right leg.  There is a sense of overall weakness in this leg.  There is no obvious foot drop.  She does note at difficulty in getting all of her stool out    Her pregnancy has been otherwise uncomplicated.  Is no vaginal discharge.  No dysuria urgency frequency hematuria.  She has been feeling good fetal movement.  Pain is moderate today.  She would like to steer clear of opioids if she can.  There is been no obvious flank pain.  And no obvious abdominal pain.    She denies saddle anesthesia and foot drop.  She has however the stooling is felt more difficult in the last few days -some sense of constipation.  Unclear if this is due to her level of pain  Allergies:  Allergies   Allergen Reactions     Pcn [Penicillins] Rash      Zithromax [Azithromycin Dihydrate] Rash       Problem List:    Patient Active Problem List    Diagnosis Date Noted     Prenatal care, subsequent pregnancy 04/05/2022     Priority: Medium     Rh negative state in antepartum period 11/25/2020     Priority: Medium     Vestibular dysfunction, unspecified laterality 08/28/2019     Priority: Medium     Normal MRI.  Saw [facility] 8/9/19 and referred to ENT       Family history of nephrolithiasis 06/02/2014     Priority: Medium     Dysmenorrhea 10/19/2012     Priority: Medium     Better with oral contraceptive pills       Recurrent UTI 10/19/2012     Priority: Medium     CARDIOVASCULAR SCREENING; LDL GOAL LESS THAN 160 05/29/2012     Priority: Medium        Past Medical History:    Past Medical History:   Diagnosis Date     Post partum depression 2020       Past Surgical History:    Past Surgical History:   Procedure Laterality Date     HC TOOTH EXTRACTION W/FORCEP      wisdom teeth     SURGICAL HISTORY OF -   2008    Bone-patellar-bone left knee anterior cruciate ligament reconstruction       Family History:    Family History   Problem Relation Age of Onset     Anxiety Disorder Mother      Obesity Father      Allergies Brother      Hypertension Maternal Grandmother      Breast Cancer Maternal Grandmother      Heart Failure Maternal Grandfather      Alcoholism Maternal Grandfather      Other - See Comments Paternal Grandmother         father adopted     C.A.D. No family hx of      Diabetes No family hx of      Cerebrovascular Disease No family hx of      Cancer - colorectal No family hx of        Social History:  Marital Status:   [2]  Social History     Tobacco Use     Smoking status: Never Smoker     Smokeless tobacco: Never Used     Tobacco comment: parents smoke   Vaping Use     Vaping Use: Never used   Substance Use Topics     Alcohol use: Not Currently     Comment: rare-quit with pregnancy     Drug use: No        Medications:    clindamycin (CLINDAMAX) 1  % external gel  Prenatal Vit-Fe Fumarate-FA (PRENATAL PO)  sertraline (ZOLOFT) 50 MG tablet          Review of Systems   Constitutional: Negative for chills, diaphoresis and fever.   HENT: Negative for ear pain, sinus pressure and sore throat.    Eyes: Negative for visual disturbance.   Respiratory: Negative for cough, shortness of breath and wheezing.    Cardiovascular: Negative for chest pain and palpitations.   Gastrointestinal: Negative for abdominal pain, blood in stool, constipation, diarrhea, nausea and vomiting.   Genitourinary: Negative for dysuria, frequency and urgency.   Musculoskeletal: Positive for back pain.   Skin: Negative for rash.   Neurological: Negative for headaches.   All other systems reviewed and are negative.      Physical Exam   BP: 118/78  Pulse: 99  Temp: 97.3  F (36.3  C)  Resp: 18  Weight: 92.5 kg (204 lb)  SpO2: 97 %      Physical Exam  Constitutional:       General: She is in acute distress.      Appearance: She is not diaphoretic.   HENT:      Head: Atraumatic.   Eyes:      Conjunctiva/sclera: Conjunctivae normal.   Cardiovascular:      Rate and Rhythm: Normal rate and regular rhythm.      Heart sounds: No murmur heard.  Pulmonary:      Effort: No respiratory distress.      Breath sounds: No stridor. No wheezing or rhonchi.   Abdominal:      General: Abdomen is flat. There is distension (gravid).      Palpations: There is no mass.      Tenderness: There is no abdominal tenderness. There is no guarding.   Musculoskeletal:      Cervical back: Neck supple.      Left lower leg: No edema.   Skin:     Coloration: Skin is not pale.      Findings: No rash.   Neurological:      General: No focal deficit present.      Mental Status: She is alert.      Motor: No weakness.         There is a bilateral straight leg raise positive with seated position.  This exacerbates paresthesias numbness in the right leg.  There is also crossed findings when the left straight leg raise is performed the right  leg experiences symptoms.  She has great toe strength is fully intact.  No obvious inner thigh numbness.  She has DTRs 2+ bilaterally at the patella and Achilles.    She has a normal posterior tibial pulse bilaterally the dorsalis pedis pulses more difficult to palpate but is palpable in the right leg.  The capillary refill is slightly reduced bilaterally.  Otherwise normal coloration distally.  Normal motor function distally      ED Course                 Procedures              Critical Care time:  none               Results for orders placed or performed during the hospital encounter of 09/19/22 (from the past 24 hour(s))   Lumbar spine MRI w/o contrast    Narrative    EXAM: MR LUMBAR SPINE W/O CONTRAST  LOCATION: [facility]  DATE/TIME: 9/19/2022 7:04 PM    INDICATION: Acute or progressive or, severe low back pain at 30 weeks gestation with now bilateral radiculopathy and weakness developing today.  suspect a large central disc.  Some difficulty with stooling   no foot drop or saddle symptoms  COMPARISON: None.  TECHNIQUE: Routine Lumbar Spine MRI without IV contrast.    FINDINGS:   Nomenclature is based on 5 lumbar type vertebral bodies with L5-S1 defined on image 50 of series 6. Normal vertebral body heights, alignment and marrow signal. Normal distal spinal cord and cauda equina with conus medullaris at L1-L2. No extraspinal   abnormality.  Fetus not well visualized on this examination.    T12-L1: Normal disc height and signal. No herniation. Normal facets. No spinal canal or neural foraminal stenosis.     L1-L2: Normal disc height and signal. No herniation. Normal facets. No spinal canal or neural foraminal stenosis.    L2-L3: Normal disc height and signal. No herniation. Normal facets. No spinal canal or neural foraminal stenosis.     L3-L4: Normal disc height and signal. No herniation. Mild bilateral facet arthropathy. No spinal canal or neural foraminal stenosis.    L4-L5:   Large disc extrusion with severe mass effect, severe canal stenosis, and near complete involvement of the canal. Subtle difference in signal intensity on the right suggests a possible free fragment. Mild bilateral facet arthropathy. No   significant foraminal stenosis.    L5-S1: Normal disc height and signal. No herniation. Normal facets. No spinal canal or neural foraminal stenosis.      Impression    IMPRESSION:  1.  Large disc extrusion at L4-L5 with possible free fragment eccentrically to the right. Marked severe canal stenosis with the residual canal measuring approximately 1 mm in AP dimension. Neurosurgical consultation and correlation for cauda equina   syndrome is recommended.    Dr. Renato Soto was contacted by me on 9/19/2022 7:16 PM and verbalized understanding of the critical result.        Medications - No data to display    Assessments & Plan (with Medical Decision Making)     MDM:Indigo Lamb is a 32 year old female presents with history of 30-week pregnancy otherwise uncomplicated chronic back pain and her pregnancies before.  3 weeks of back pain now but now with an acute onset of numbness weakness sensation in the left leg that is crossing symptoms suggestive of large central disc.  Also with difficulty with stooling could represent early cauda equina symptoms.  The symptoms are rapidly progressive today compared to her baseline before.  pursue MRI.    Fetal heart tones are in the 130s 140s.  Her bladder scan shows 145 cc of urine in the bladder.    Her MRI imaging demonstrates a very severe compression at L5 I was called by radiology about this.  It appears to be a significant risk for cauda equina.  She currently does not have cauda equina symptoms.  She has a rapid progression of neurologic changes however in the right leg including a complete numbness of the lower leg now that developed while she was in the department.  I spoke with Dr. Murray at the [facility] who coordinated  the patient to transfer to the  with the intent to perform spine surgery either tonight or tomorrow depending on the patient's progression.  She has spoken with maternal-fetal medicine.  The patient will be on the antepartum casillas as there are no available beds on the medical casillas.  I spoke with Indigo and her mother several times about the plan.  She understands the intent to perform surgery in the next 24 hours.        I have reviewed the nursing notes.    I have reviewed the findings, diagnosis, plan and need for follow up with the patient.       New Prescriptions    No medications on file       Final diagnoses:   Lumbosacral radiculopathy at L5   Pregnant state, incidental       9/19/2022   [facility] EMERGENCY DEPT     Renato Soto MD  09/20/22 0144

## 2022-09-19 NOTE — PATIENT INSTRUCTIONS
Maikel Sood,   I see the OB put in the Physical therapy referral, which is what I recommend too.    Thank you for choosing us for your care. Based on your symptoms and length of illness, I do not think that you need a prescription at this time.  Please follow the care advise I've provided and use the over the counter medications to help relieve your symptoms.  View your full visit summary for details by clicking on the link below.     If you're not feeling better within 2-3 weeks, please respond to this message and we can consider if a prescription is needed.  You can schedule an appointment right here in [facility], or call [phone].  You will not be charged for this visit.

## 2022-09-19 NOTE — ED NOTES
Pt has been experiencing lower back pain that radiates down back side of L leg, with numbness and tingling in foot. This has been ongoing for the past 4-6 days, but today pt got up from seated position and felt a twinge in her lower back on the R side. Since then, she has now been experiencing the same symptoms in her R leg. Pt states that, currently, her pain in R leg is more intense than in her L leg.     Pt states that no position is effective in relieving pain; pt has been using ice and heat packs but with no effect.

## 2022-09-19 NOTE — ED NOTES
Pt given tylenol for pain rated 8/10.     Pt completing MRI paperwork and gown given in preparation for MRI. MRI time TBD.

## 2022-09-19 NOTE — LETTER
[facility] MED SURG  [address] [address]  Phone: [phone]  Fax: [phone]    September 22, 2022        Indigo Lamb  [address] [address] [address]          To whom it may concern:    RE: Indigo Lamb      Please contact me for questions or concerns.    Ms Lamb was admitted to the hospital on 9/20/2022 and has undergone spine surgery.  She needs rest for 4 weeks for healing and adequate rehabilitation.  After 4 weeks she can return to work but with lifting restrictions of less than 10 pounds for at least 6 weeks after the surgery.    Sincerely,    David Bay MD

## 2022-09-20 ENCOUNTER — ANESTHESIA (OUTPATIENT)
Dept: SURGERY | Facility: CLINIC | Age: 32
End: 2022-09-20
Payer: COMMERCIAL

## 2022-09-20 ENCOUNTER — APPOINTMENT (OUTPATIENT)
Dept: GENERAL RADIOLOGY | Facility: CLINIC | Age: 32
End: 2022-09-20
Attending: NEUROLOGICAL SURGERY
Payer: COMMERCIAL

## 2022-09-20 ENCOUNTER — ANESTHESIA EVENT (OUTPATIENT)
Dept: SURGERY | Facility: CLINIC | Age: 32
End: 2022-09-20
Payer: COMMERCIAL

## 2022-09-20 LAB
ABO/RH(D): ABNORMAL
ANTIBODY ID: NORMAL
ANTIBODY SCREEN: POSITIVE
APTT PPP: 60 SECONDS (ref 22–38)
HOLD SPECIMEN: NORMAL
INR PPP: 1.01 (ref 0.85–1.15)
LACTATE SERPL-SCNC: 1.3 MMOL/L (ref 0.7–2)
SARS-COV-2 RNA RESP QL NAA+PROBE: NEGATIVE
SPECIMEN EXPIRATION DATE: ABNORMAL
SPECIMEN EXPIRATION DATE: NORMAL

## 2022-09-20 PROCEDURE — 999N000063 XR CROSSTABLE LATERAL LUMBAR SPINE PORTABLE

## 2022-09-20 PROCEDURE — 250N000011 HC RX IP 250 OP 636: Performed by: NURSE ANESTHETIST, CERTIFIED REGISTERED

## 2022-09-20 PROCEDURE — 250N000009 HC RX 250: Performed by: NEUROLOGICAL SURGERY

## 2022-09-20 PROCEDURE — 250N000025 HC SEVOFLURANE, PER MIN: Performed by: NEUROLOGICAL SURGERY

## 2022-09-20 PROCEDURE — 250N000009 HC RX 250: Performed by: NURSE ANESTHETIST, CERTIFIED REGISTERED

## 2022-09-20 PROCEDURE — 250N000013 HC RX MED GY IP 250 OP 250 PS 637

## 2022-09-20 PROCEDURE — 99231 SBSQ HOSP IP/OBS SF/LOW 25: CPT | Mod: 25 | Performed by: OBSTETRICS & GYNECOLOGY

## 2022-09-20 PROCEDURE — 96372 THER/PROPH/DIAG INJ SC/IM: CPT

## 2022-09-20 PROCEDURE — 710N000010 HC RECOVERY PHASE 1, LEVEL 2, PER MIN: Performed by: NEUROLOGICAL SURGERY

## 2022-09-20 PROCEDURE — 83605 ASSAY OF LACTIC ACID: CPT | Performed by: OBSTETRICS & GYNECOLOGY

## 2022-09-20 PROCEDURE — 120N000002 HC R&B MED SURG/OB UMMC

## 2022-09-20 PROCEDURE — 86922 COMPATIBILITY TEST ANTIGLOB: CPT

## 2022-09-20 PROCEDURE — 36415 COLL VENOUS BLD VENIPUNCTURE: CPT

## 2022-09-20 PROCEDURE — 250N000011 HC RX IP 250 OP 636: Performed by: STUDENT IN AN ORGANIZED HEALTH CARE EDUCATION/TRAINING PROGRAM

## 2022-09-20 PROCEDURE — 63030 LAMOT DCMPRN NRV RT 1 LMBR: CPT | Mod: RT | Performed by: NEUROLOGICAL SURGERY

## 2022-09-20 PROCEDURE — U0005 INFEC AGEN DETEC AMPLI PROBE: HCPCS

## 2022-09-20 PROCEDURE — 250N000013 HC RX MED GY IP 250 OP 250 PS 637: Performed by: STUDENT IN AN ORGANIZED HEALTH CARE EDUCATION/TRAINING PROGRAM

## 2022-09-20 PROCEDURE — 999N000127 HC STATISTIC PERIPHERAL IV START W US GUIDANCE

## 2022-09-20 PROCEDURE — 36415 COLL VENOUS BLD VENIPUNCTURE: CPT | Performed by: OBSTETRICS & GYNECOLOGY

## 2022-09-20 PROCEDURE — 272N000001 HC OR GENERAL SUPPLY STERILE: Performed by: NEUROLOGICAL SURGERY

## 2022-09-20 PROCEDURE — 85610 PROTHROMBIN TIME: CPT

## 2022-09-20 PROCEDURE — 258N000003 HC RX IP 258 OP 636: Performed by: STUDENT IN AN ORGANIZED HEALTH CARE EDUCATION/TRAINING PROGRAM

## 2022-09-20 PROCEDURE — 86901 BLOOD TYPING SEROLOGIC RH(D): CPT

## 2022-09-20 PROCEDURE — 999N000141 HC STATISTIC PRE-PROCEDURE NURSING ASSESSMENT: Performed by: NEUROLOGICAL SURGERY

## 2022-09-20 PROCEDURE — 360N000084 HC SURGERY LEVEL 4 W/ FLUORO, PER MIN: Performed by: NEUROLOGICAL SURGERY

## 2022-09-20 PROCEDURE — 86870 RBC ANTIBODY IDENTIFICATION: CPT

## 2022-09-20 PROCEDURE — 250N000011 HC RX IP 250 OP 636

## 2022-09-20 PROCEDURE — 272N000004 HC RX 272: Performed by: NEUROLOGICAL SURGERY

## 2022-09-20 PROCEDURE — 59025 FETAL NON-STRESS TEST: CPT | Mod: 26 | Performed by: OBSTETRICS & GYNECOLOGY

## 2022-09-20 PROCEDURE — 258N000003 HC RX IP 258 OP 636: Performed by: NURSE ANESTHETIST, CERTIFIED REGISTERED

## 2022-09-20 PROCEDURE — 370N000017 HC ANESTHESIA TECHNICAL FEE, PER MIN: Performed by: NEUROLOGICAL SURGERY

## 2022-09-20 PROCEDURE — 85730 THROMBOPLASTIN TIME PARTIAL: CPT

## 2022-09-20 PROCEDURE — 258N000003 HC RX IP 258 OP 636

## 2022-09-20 RX ORDER — MAGNESIUM HYDROXIDE 1200 MG/15ML
LIQUID ORAL PRN
Status: DISCONTINUED | OUTPATIENT
Start: 2022-09-20 | End: 2022-09-20 | Stop reason: HOSPADM

## 2022-09-20 RX ORDER — AMOXICILLIN 250 MG
1 CAPSULE ORAL 2 TIMES DAILY
Status: DISCONTINUED | OUTPATIENT
Start: 2022-09-20 | End: 2022-09-22 | Stop reason: HOSPADM

## 2022-09-20 RX ORDER — ACETAMINOPHEN 325 MG/1
650 TABLET ORAL EVERY 4 HOURS PRN
Status: DISCONTINUED | OUTPATIENT
Start: 2022-09-23 | End: 2022-09-22 | Stop reason: HOSPADM

## 2022-09-20 RX ORDER — BUPIVACAINE HYDROCHLORIDE AND EPINEPHRINE 2.5; 5 MG/ML; UG/ML
INJECTION, SOLUTION INFILTRATION; PERINEURAL PRN
Status: DISCONTINUED | OUTPATIENT
Start: 2022-09-20 | End: 2022-09-20 | Stop reason: HOSPADM

## 2022-09-20 RX ORDER — ONDANSETRON 4 MG/1
4 TABLET, ORALLY DISINTEGRATING ORAL EVERY 30 MIN PRN
Status: DISCONTINUED | OUTPATIENT
Start: 2022-09-20 | End: 2022-09-20 | Stop reason: HOSPADM

## 2022-09-20 RX ORDER — FENTANYL CITRATE 50 UG/ML
25 INJECTION, SOLUTION INTRAMUSCULAR; INTRAVENOUS EVERY 5 MIN PRN
Status: DISCONTINUED | OUTPATIENT
Start: 2022-09-20 | End: 2022-09-20 | Stop reason: HOSPADM

## 2022-09-20 RX ORDER — SODIUM CHLORIDE, SODIUM LACTATE, POTASSIUM CHLORIDE, CALCIUM CHLORIDE 600; 310; 30; 20 MG/100ML; MG/100ML; MG/100ML; MG/100ML
INJECTION, SOLUTION INTRAVENOUS CONTINUOUS
Status: DISCONTINUED | OUTPATIENT
Start: 2022-09-20 | End: 2022-09-20 | Stop reason: HOSPADM

## 2022-09-20 RX ORDER — ONDANSETRON 2 MG/ML
4 INJECTION INTRAMUSCULAR; INTRAVENOUS EVERY 30 MIN PRN
Status: DISCONTINUED | OUTPATIENT
Start: 2022-09-20 | End: 2022-09-20 | Stop reason: HOSPADM

## 2022-09-20 RX ORDER — OXYCODONE HYDROCHLORIDE 10 MG/1
10 TABLET ORAL EVERY 4 HOURS PRN
Status: DISCONTINUED | OUTPATIENT
Start: 2022-09-20 | End: 2022-09-22 | Stop reason: HOSPADM

## 2022-09-20 RX ORDER — BISACODYL 10 MG
10 SUPPOSITORY, RECTAL RECTAL DAILY PRN
Status: DISCONTINUED | OUTPATIENT
Start: 2022-09-20 | End: 2022-09-22 | Stop reason: HOSPADM

## 2022-09-20 RX ORDER — NEOSTIGMINE METHYLSULFATE 1 MG/ML
VIAL (ML) INJECTION PRN
Status: DISCONTINUED | OUTPATIENT
Start: 2022-09-20 | End: 2022-09-20

## 2022-09-20 RX ORDER — ACETAMINOPHEN 325 MG/1
975 TABLET ORAL EVERY 8 HOURS
Status: DISCONTINUED | OUTPATIENT
Start: 2022-09-20 | End: 2022-09-22 | Stop reason: HOSPADM

## 2022-09-20 RX ORDER — ONDANSETRON 2 MG/ML
INJECTION INTRAMUSCULAR; INTRAVENOUS PRN
Status: DISCONTINUED | OUTPATIENT
Start: 2022-09-20 | End: 2022-09-20

## 2022-09-20 RX ORDER — SODIUM CHLORIDE, SODIUM LACTATE, POTASSIUM CHLORIDE, CALCIUM CHLORIDE 600; 310; 30; 20 MG/100ML; MG/100ML; MG/100ML; MG/100ML
INJECTION, SOLUTION INTRAVENOUS CONTINUOUS PRN
Status: DISCONTINUED | OUTPATIENT
Start: 2022-09-20 | End: 2022-09-20

## 2022-09-20 RX ORDER — PROCHLORPERAZINE MALEATE 10 MG
10 TABLET ORAL EVERY 6 HOURS PRN
Status: DISCONTINUED | OUTPATIENT
Start: 2022-09-20 | End: 2022-09-22 | Stop reason: HOSPADM

## 2022-09-20 RX ORDER — FENTANYL CITRATE 50 UG/ML
INJECTION, SOLUTION INTRAMUSCULAR; INTRAVENOUS PRN
Status: DISCONTINUED | OUTPATIENT
Start: 2022-09-20 | End: 2022-09-20

## 2022-09-20 RX ORDER — NALOXONE HYDROCHLORIDE 0.4 MG/ML
0.2 INJECTION, SOLUTION INTRAMUSCULAR; INTRAVENOUS; SUBCUTANEOUS
Status: DISCONTINUED | OUTPATIENT
Start: 2022-09-20 | End: 2022-09-22 | Stop reason: HOSPADM

## 2022-09-20 RX ORDER — CITRIC ACID/SODIUM CITRATE 334-500MG
30 SOLUTION, ORAL ORAL ONCE
Status: COMPLETED | OUTPATIENT
Start: 2022-09-20 | End: 2022-09-20

## 2022-09-20 RX ORDER — HYDROMORPHONE HYDROCHLORIDE 1 MG/ML
0.2 INJECTION, SOLUTION INTRAMUSCULAR; INTRAVENOUS; SUBCUTANEOUS EVERY 5 MIN PRN
Status: DISCONTINUED | OUTPATIENT
Start: 2022-09-20 | End: 2022-09-20 | Stop reason: HOSPADM

## 2022-09-20 RX ORDER — NALOXONE HYDROCHLORIDE 0.4 MG/ML
0.4 INJECTION, SOLUTION INTRAMUSCULAR; INTRAVENOUS; SUBCUTANEOUS
Status: DISCONTINUED | OUTPATIENT
Start: 2022-09-20 | End: 2022-09-22 | Stop reason: HOSPADM

## 2022-09-20 RX ORDER — OXYCODONE HYDROCHLORIDE 5 MG/1
5 TABLET ORAL EVERY 4 HOURS PRN
Status: DISCONTINUED | OUTPATIENT
Start: 2022-09-20 | End: 2022-09-20 | Stop reason: HOSPADM

## 2022-09-20 RX ORDER — CLINDAMYCIN PHOSPHATE 900 MG/50ML
900 INJECTION, SOLUTION INTRAVENOUS EVERY 8 HOURS
Status: DISCONTINUED | OUTPATIENT
Start: 2022-09-20 | End: 2022-09-20 | Stop reason: HOSPADM

## 2022-09-20 RX ORDER — SODIUM CHLORIDE, SODIUM LACTATE, POTASSIUM CHLORIDE, CALCIUM CHLORIDE 600; 310; 30; 20 MG/100ML; MG/100ML; MG/100ML; MG/100ML
INJECTION, SOLUTION INTRAVENOUS CONTINUOUS
Status: DISCONTINUED | OUTPATIENT
Start: 2022-09-20 | End: 2022-09-20

## 2022-09-20 RX ORDER — LIDOCAINE 40 MG/G
CREAM TOPICAL
Status: DISCONTINUED | OUTPATIENT
Start: 2022-09-20 | End: 2022-09-22 | Stop reason: HOSPADM

## 2022-09-20 RX ORDER — SODIUM CHLORIDE, SODIUM GLUCONATE, SODIUM ACETATE, POTASSIUM CHLORIDE AND MAGNESIUM CHLORIDE 526; 502; 368; 37; 30 MG/100ML; MG/100ML; MG/100ML; MG/100ML; MG/100ML
INJECTION, SOLUTION INTRAVENOUS CONTINUOUS PRN
Status: DISCONTINUED | OUTPATIENT
Start: 2022-09-20 | End: 2022-09-20

## 2022-09-20 RX ORDER — DEXAMETHASONE SODIUM PHOSPHATE 4 MG/ML
6 INJECTION, SOLUTION INTRA-ARTICULAR; INTRALESIONAL; INTRAMUSCULAR; INTRAVENOUS; SOFT TISSUE EVERY 6 HOURS
Status: COMPLETED | OUTPATIENT
Start: 2022-09-20 | End: 2022-09-21

## 2022-09-20 RX ORDER — POLYETHYLENE GLYCOL 3350 17 G/17G
17 POWDER, FOR SOLUTION ORAL DAILY
Status: DISCONTINUED | OUTPATIENT
Start: 2022-09-21 | End: 2022-09-22 | Stop reason: HOSPADM

## 2022-09-20 RX ORDER — GLYCOPYRROLATE 0.2 MG/ML
INJECTION, SOLUTION INTRAMUSCULAR; INTRAVENOUS PRN
Status: DISCONTINUED | OUTPATIENT
Start: 2022-09-20 | End: 2022-09-20

## 2022-09-20 RX ORDER — OXYCODONE HYDROCHLORIDE 5 MG/1
5 TABLET ORAL EVERY 4 HOURS PRN
Status: DISCONTINUED | OUTPATIENT
Start: 2022-09-20 | End: 2022-09-22 | Stop reason: HOSPADM

## 2022-09-20 RX ORDER — PROPOFOL 10 MG/ML
INJECTION, EMULSION INTRAVENOUS PRN
Status: DISCONTINUED | OUTPATIENT
Start: 2022-09-20 | End: 2022-09-20

## 2022-09-20 RX ORDER — ONDANSETRON 2 MG/ML
4 INJECTION INTRAMUSCULAR; INTRAVENOUS EVERY 6 HOURS PRN
Status: DISCONTINUED | OUTPATIENT
Start: 2022-09-20 | End: 2022-09-22 | Stop reason: HOSPADM

## 2022-09-20 RX ORDER — ONDANSETRON 4 MG/1
4 TABLET, ORALLY DISINTEGRATING ORAL EVERY 6 HOURS PRN
Status: DISCONTINUED | OUTPATIENT
Start: 2022-09-20 | End: 2022-09-22 | Stop reason: HOSPADM

## 2022-09-20 RX ADMIN — Medication 50 MG: at 16:04

## 2022-09-20 RX ADMIN — SODIUM CHLORIDE, POTASSIUM CHLORIDE, SODIUM LACTATE AND CALCIUM CHLORIDE: 600; 310; 30; 20 INJECTION, SOLUTION INTRAVENOUS at 10:52

## 2022-09-20 RX ADMIN — FENTANYL CITRATE 25 MCG: 50 INJECTION, SOLUTION INTRAMUSCULAR; INTRAVENOUS at 17:44

## 2022-09-20 RX ADMIN — DEXAMETHASONE SODIUM PHOSPHATE 6 MG: 4 INJECTION, SOLUTION INTRAMUSCULAR; INTRAVENOUS at 19:29

## 2022-09-20 RX ADMIN — SODIUM CHLORIDE, POTASSIUM CHLORIDE, SODIUM LACTATE AND CALCIUM CHLORIDE: 600; 310; 30; 20 INJECTION, SOLUTION INTRAVENOUS at 15:46

## 2022-09-20 RX ADMIN — ONDANSETRON 4 MG: 2 INJECTION INTRAMUSCULAR; INTRAVENOUS at 17:39

## 2022-09-20 RX ADMIN — PHENYLEPHRINE HYDROCHLORIDE 100 MCG: 10 INJECTION INTRAVENOUS at 16:51

## 2022-09-20 RX ADMIN — BETAMETHASONE SODIUM PHOSPHATE AND BETAMETHASONE ACETATE 12 MG: 3; 3 INJECTION, SUSPENSION INTRA-ARTICULAR; INTRALESIONAL; INTRAMUSCULAR; SOFT TISSUE at 00:17

## 2022-09-20 RX ADMIN — FENTANYL CITRATE 25 MCG: 0.05 INJECTION, SOLUTION INTRAMUSCULAR; INTRAVENOUS at 18:53

## 2022-09-20 RX ADMIN — PHENYLEPHRINE HYDROCHLORIDE 100 MCG: 10 INJECTION INTRAVENOUS at 15:58

## 2022-09-20 RX ADMIN — PHENYLEPHRINE HYDROCHLORIDE 100 MCG: 10 INJECTION INTRAVENOUS at 16:04

## 2022-09-20 RX ADMIN — FENTANYL CITRATE 25 MCG: 50 INJECTION, SOLUTION INTRAMUSCULAR; INTRAVENOUS at 18:01

## 2022-09-20 RX ADMIN — SODIUM CITRATE AND CITRIC ACID MONOHYDRATE 30 ML: 500; 334 SOLUTION ORAL at 15:24

## 2022-09-20 RX ADMIN — NEOSTIGMINE METHYLSULFATE 5 MG: 1 INJECTION, SOLUTION INTRAVENOUS at 18:12

## 2022-09-20 RX ADMIN — OXYCODONE HYDROCHLORIDE 5 MG: 5 TABLET ORAL at 18:56

## 2022-09-20 RX ADMIN — SODIUM CHLORIDE, SODIUM GLUCONATE, SODIUM ACETATE, POTASSIUM CHLORIDE AND MAGNESIUM CHLORIDE: 526; 502; 368; 37; 30 INJECTION, SOLUTION INTRAVENOUS at 16:08

## 2022-09-20 RX ADMIN — PHENYLEPHRINE HYDROCHLORIDE 200 MCG: 10 INJECTION INTRAVENOUS at 16:09

## 2022-09-20 RX ADMIN — GLYCOPYRROLATE 0.8 MG: 0.2 INJECTION, SOLUTION INTRAMUSCULAR; INTRAVENOUS at 18:12

## 2022-09-20 RX ADMIN — PHENYLEPHRINE HYDROCHLORIDE 0.3 MCG/KG/MIN: 10 INJECTION INTRAVENOUS at 16:06

## 2022-09-20 RX ADMIN — SENNOSIDES AND DOCUSATE SODIUM 1 TABLET: 50; 8.6 TABLET ORAL at 23:12

## 2022-09-20 RX ADMIN — FENTANYL CITRATE 25 MCG: 0.05 INJECTION, SOLUTION INTRAMUSCULAR; INTRAVENOUS at 18:44

## 2022-09-20 RX ADMIN — HYDROMORPHONE HYDROCHLORIDE 0.5 MG: 1 INJECTION, SOLUTION INTRAMUSCULAR; INTRAVENOUS; SUBCUTANEOUS at 18:29

## 2022-09-20 RX ADMIN — PHENYLEPHRINE HYDROCHLORIDE 100 MCG: 10 INJECTION INTRAVENOUS at 15:46

## 2022-09-20 RX ADMIN — PHENYLEPHRINE HYDROCHLORIDE 200 MCG: 10 INJECTION INTRAVENOUS at 16:06

## 2022-09-20 RX ADMIN — SERTRALINE 50 MG: 25 TABLET, FILM COATED ORAL at 08:13

## 2022-09-20 RX ADMIN — SUCCINYLCHOLINE CHLORIDE 100 MG: 20 INJECTION, SOLUTION INTRAMUSCULAR; INTRAVENOUS; PARENTERAL at 15:46

## 2022-09-20 RX ADMIN — SODIUM CHLORIDE, POTASSIUM CHLORIDE, SODIUM LACTATE AND CALCIUM CHLORIDE: 600; 310; 30; 20 INJECTION, SOLUTION INTRAVENOUS at 22:02

## 2022-09-20 RX ADMIN — SODIUM CHLORIDE, POTASSIUM CHLORIDE, SODIUM LACTATE AND CALCIUM CHLORIDE: 600; 310; 30; 20 INJECTION, SOLUTION INTRAVENOUS at 20:13

## 2022-09-20 RX ADMIN — ACETAMINOPHEN 975 MG: 325 TABLET, FILM COATED ORAL at 19:01

## 2022-09-20 RX ADMIN — FENTANYL CITRATE 25 MCG: 50 INJECTION, SOLUTION INTRAMUSCULAR; INTRAVENOUS at 18:29

## 2022-09-20 RX ADMIN — CLINDAMYCIN PHOSPHATE 900 MG: 900 INJECTION, SOLUTION INTRAVENOUS at 15:27

## 2022-09-20 RX ADMIN — PROPOFOL 200 MG: 10 INJECTION, EMULSION INTRAVENOUS at 15:46

## 2022-09-20 RX ADMIN — OXYCODONE HYDROCHLORIDE 10 MG: 10 TABLET ORAL at 23:12

## 2022-09-20 RX ADMIN — FENTANYL CITRATE 25 MCG: 0.05 INJECTION, SOLUTION INTRAMUSCULAR; INTRAVENOUS at 19:13

## 2022-09-20 RX ADMIN — PHENYLEPHRINE HYDROCHLORIDE 100 MCG: 10 INJECTION INTRAVENOUS at 16:20

## 2022-09-20 RX ADMIN — FENTANYL CITRATE 25 MCG: 50 INJECTION, SOLUTION INTRAMUSCULAR; INTRAVENOUS at 17:38

## 2022-09-20 RX ADMIN — FENTANYL CITRATE 25 MCG: 0.05 INJECTION, SOLUTION INTRAMUSCULAR; INTRAVENOUS at 18:37

## 2022-09-20 ASSESSMENT — ACTIVITIES OF DAILY LIVING (ADL)
ADLS_ACUITY_SCORE: 18
ADLS_ACUITY_SCORE: 35
ADLS_ACUITY_SCORE: 18

## 2022-09-20 NOTE — ANESTHESIA PREPROCEDURE EVALUATION
Anesthesia Pre-Procedure Evaluation    Patient: Indigo Lamb   MRN: [id number] : 1990        Procedure : Procedure(s):  OB anesthesia consult           Past Medical History:   Diagnosis Date     Post partum depression       Past Surgical History:   Procedure Laterality Date     HC TOOTH EXTRACTION W/FORCEP      wisdom teeth     SURGICAL HISTORY OF -       Bone-patellar-bone left knee anterior cruciate ligament reconstruction      Allergies   Allergen Reactions     Pcn [Penicillins] Rash     Zithromax [Azithromycin Dihydrate] Rash      Social History     Tobacco Use     Smoking status: Never Smoker     Smokeless tobacco: Never Used     Tobacco comment: parents smoke   Substance Use Topics     Alcohol use: Not Currently     Comment: rare-quit with pregnancy      Wt Readings from Last 1 Encounters:   22 89.2 kg (196 lb 10.4 oz)        Anesthesia Evaluation   Pt has had prior anesthetic. Type: OB Labor Epidural.    No history of anesthetic complications       ROS/MED HX  ENT/Pulmonary:  - neg pulmonary ROS     Neurologic:     (+) Spinal cord injury, year sustained: , level of injury: L4-5, without autonomic hyperflexia symptoms,     Cardiovascular:  - neg cardiovascular ROS     METS/Exercise Tolerance: >4 METS    Hematologic:  - neg hematologic  ROS     Musculoskeletal:  - neg musculoskeletal ROS     GI/Hepatic:  - neg GI/hepatic ROS     Renal/Genitourinary:  - neg Renal ROS     Endo:  - neg endo ROS     Psychiatric/Substance Use:  - neg psychiatric ROS     Infectious Disease:  - neg infectious disease ROS     Malignancy:  - neg malignancy ROS     Other:      (+) Possibly pregnant, ,         Physical Exam    Airway        Mallampati: I   TM distance: > 3 FB   Neck ROM: full   Mouth opening: > 3 cm    Respiratory Devices and Support         Dental       (+) chipped      Cardiovascular   cardiovascular exam normal          Pulmonary   pulmonary exam normal                OUTSIDE LABS:  CBC:    Lab Results   Component Value Date    WBC 8.5 08/15/2022    WBC 7.4 2022    HGB 11.6 (L) 08/15/2022    HGB 13.0 2022    HCT 35.4 08/15/2022    HCT 39.8 2022     08/15/2022     2022     BMP:   Lab Results   Component Value Date     2018    POTASSIUM 4.3 2018    CHLORIDE 107 2018    CO2 26 2018    BUN 14 2018    CR 0.82 2018    GLC 95 2018    GLC 77 2013     COAGS:   Lab Results   Component Value Date    PTT 60 (H) 2022    INR 1.01 2022     POC:   Lab Results   Component Value Date    HCG Negative 2014     HEPATIC:   Lab Results   Component Value Date    ALBUMIN 3.3 (L) 2018    PROTTOTAL 7.0 2018    ALT 19 2018    AST 15 2018    ALKPHOS 63 2018    BILITOTAL 0.5 2018     OTHER:   Lab Results   Component Value Date    KATY 8.9 2018    TSH 1.83 2018    SED 6 2005       Anesthesia Plan    ASA Status:  2   NPO Status:  ELEVATED Aspiration Risk/Unknown    Anesthesia Type: General.     - Airway: ETT   Induction: RSI.   Maintenance: Balanced.   Techniques and Equipment:     - Lines/Monitors: 2nd IV, BIS     - Blood: T&S     Consents    Anesthesia Plan(s) and associated risks, benefits, and realistic alternatives discussed. Questions answered and patient/representative(s) expressed understanding.     - Discussed: Risks, Benefits and Alternatives for BOTH SEDATION and the PROCEDURE were discussed     - Discussed with:  Patient      - Extended Intubation/Ventilatory Support Discussed: No.      - Patient is DNR/DNI Status: No    Use of blood products discussed: Yes.     - Discussed with: Patient.     - Consented: consented to blood products            Reason for refusal: other.     Postoperative Care    Pain management: IV analgesics, Multi-modal analgesia.   PONV prophylaxis: Ondansetron (or other 5HT-3)     Comments:    Other Comments: 31 yo F  29w. Consulted  for L4-5 disc herniation s/p decompression 9/20. Her sx include RLE radiating pain and numbness in R foot and anterior shin. She is otherwise healthy and pregnancy has been uncomplicated. Discussion include; delivery under GETA during decompression.  Plan for GETA with RSI and Bicitra in preop,intraop baby monitoring, MAPs >70-75       H&P reviewed: Unable to attach VIRTUAL H&P to encounter due to EHR limitations. Appropriate H&P reviewed. The physical exam performed by anesthesia during this surgical encounter serves as the physical portion of that virtual H&P.  Any significant changes noted within this preop evaluation.     neg OB ROS.         Kimberly Tamez MD   High Dose Vitamin A Pregnancy And Lactation Text: High dose vitamin A therapy is contraindicated during pregnancy and breast feeding.

## 2022-09-20 NOTE — ANESTHESIA CARE TRANSFER NOTE
Patient: Indigo Lamb    Procedure: Procedure(s):  RIGHT HEMILAMINECTOMY,  WITH MICRODISCECTOMY L4-5       Diagnosis: Lumbar disc herniation [M51.26]  Diagnosis Additional Information: No value filed.    Anesthesia Type:   General     Note:    Oropharynx: oropharynx clear of all foreign objects  Level of Consciousness: awake  Oxygen Supplementation: face mask  Level of Supplemental Oxygen (L/min / FiO2): 8  Independent Airway: airway patency satisfactory and stable  Dentition: dentition unchanged  Vital Signs Stable: post-procedure vital signs reviewed and stable  Report to RN Given: handoff report given  Patient transferred to: PACU    Handoff Report: Identifed the Patient, Identified the Reponsible Provider, Reviewed the pertinent medical history, Discussed the surgical course, Reviewed Intra-OP anesthesia mangement and issues during anesthesia, Set expectations for post-procedure period and Allowed opportunity for questions and acknowledgement of understanding      Vitals:  Vitals Value Taken Time   /87    Temp 36.6    Pulse 123 09/20/22 1828   Resp 17 09/20/22 1828   SpO2 100 % 09/20/22 1828   Vitals shown include unvalidated device data.    Electronically Signed By: ZUNILDA Syed CRNA  September 20, 2022  6:30 PM

## 2022-09-20 NOTE — PLAN OF CARE
Pt admitted for observation overnight for back surgery in the morning. Pt's VSS and afebrile. Tracing reactive with positive accels and moderate variability. See flowsheets for EFM and ctx documentation. Pt was able to get some rest throughout the night. Voiding appropriately without difficulty. Support person at bedside.

## 2022-09-20 NOTE — PROGRESS NOTES
"M Antepartum Progress Note    Subjective:    Indigo is feeling about the same this morning. She continues to have numbness in her left leg and pain with walking which she feels is stable. She denies loss of bowel or bladder control. She notes some anxiety related to the surgery this afternoon; states her  is flying in from [place] and will be here later this evening.     Objective:  Vitals:    22 2202 22 2342 22 0602   BP: 115/72  114/64   BP Location: Left arm  Left arm   Patient Position: Semi-Yao's  Semi-Yao's   Cuff Size: Adult Regular  Adult Regular   Pulse:   77   Resp:   18   Temp: 97.9  F (36.6  C)  98.1  F (36.7  C)   TempSrc: Oral  Oral   Weight:  92.5 kg (204 lb)    Height:  1.651 m (5' 5\")        No intake/output data recorded.    Gen: Resting comfortably in bed, NAD  CV: Well perfused  Resp: Normal work of breathing  Abd: Gravid, non-distended      FHT: , moderate variability, + accels, no decels  [place]: quiet    Assessment/Plan:   Indigo Lamb is a 32 year old  at 29w5d by LMP consistent with 10w1d US admitted for further management of L4-L5 disc protrusion with resulting severe spinal stenosis.     # L4-L5 spinal stenosis  - Plan for OR today for laminectomy this afternoon  - s/p anesthesia consult   - Position on Jose Juan table to ensure no compression on gravid uterus in prone position     # Fetal Well-Being   - s/p BMZ dose 1  at 0017  - EFM during surgery if feasible as well as in PACU   -  consent signed in the event urgent delivery is required  - Rh negative, s/p Rhogam     Pricila Brink MS4     Physician Attestation   I, Lashon Joel MD, was present with the medical/ARNALDO student who participated in the service and in the documentation of the note.  I have verified the history and personally performed the physical exam and medical decision making.  I agree with the assessment and plan of care as documented in the " note.      I personally reviewed vital signs, medications, labs and imaging.    Russo findings: Indigo is doing well today overall. Decompression laminectomy with neurosurgery team planned today. Indigo has received first dose of BMZ injection. Planning fetal monitoring pre-op, during surgery as feasible and in the PACU. C/S consent signed 9/19/22.    Lashon Joel MD  Date of Service (when I saw the patient): 09/20/22

## 2022-09-20 NOTE — CONSULTS
Maternal-Fetal Medicine Consultation    Indigo Lamb  : 1990  MRN: [id number]    HPI:  Indigo Lamb is a 32 year old  at 29w4d by LMP consistent with 10w1d admitted to the Neurosurgery service for further evaluation of L4-L5 disc herniation with resulting severe spinal stenosis. Indigo has been experiencing lower back pain radiating down her left leg for awhile. However, today she began experiencing right sided low back pain with pain radiating down her leg. She was evaluated at the [facility] ED and MRI demonstrated L4-L5 disc herniation with severe spinal stenosis. She was transferred to [facility] for Neurosurgery care.    Pregnancy complicated by:  1) L4-L5 disc herniation with severe spinal stenosis    Obstetrics History:  OB History    Para Term  AB Living   2 1 1 0 0 1   SAB IAB Ectopic Multiple Live Births   0 0 0 0 1      # Outcome Date GA Lbr Shalom/2nd Weight Sex Delivery Anes PTL Lv   2 Current            1 Term 20 41w0d 12:57 / 02:16 3.32 kg (7 lb 5.1 oz) M Vag-Spont EPI N LINSEY      Name: Clarence      Apgar1: 8  Apgar5: 9     Past Medical History:  Past Medical History:   Diagnosis Date     Post partum depression      Past Surgical History:  Past Surgical History:   Procedure Laterality Date     HC TOOTH EXTRACTION W/FORCEP      wisdom teeth     SURGICAL HISTORY OF -       Bone-patellar-bone left knee anterior cruciate ligament reconstruction     Current Medications:  Prior to Admission medications    Medication Sig Last Dose Taking? Auth Provider Long Term End Date   clindamycin (CLINDAMAX) 1 % external gel Apply topically 2 times daily   Rocco Weston MD     Prenatal Vit-Fe Fumarate-FA (PRENATAL PO)    Reported, Patient No    sertraline (ZOLOFT) 50 MG tablet Take 1 tablet (50 mg) by mouth daily   Rocco Weston MD Yes      Allergies:  Pcn [penicillins] and Zithromax [azithromycin dihydrate]    Social History:   Social History      Socioeconomic History     Marital status:      Spouse name: Not on file     Number of children: Not on file     Years of education: Not on file     Highest education level: Not on file   Occupational History     Not on file   Tobacco Use     Smoking status: Never Smoker     Smokeless tobacco: Never Used     Tobacco comment: parents smoke   Vaping Use     Vaping Use: Never used   Substance and Sexual Activity     Alcohol use: Not Currently     Comment: rare-quit with pregnancy     Drug use: No     Sexual activity: Yes     Partners: Male     Birth control/protection: Condom     Comment: boyfriend since 2010   Other Topics Concern     Parent/sibling w/ CABG, MI or angioplasty before 65F 55M? No   Social History Narrative     Not on file     Social Determinants of Health     Financial Resource Strain: Not on file   Food Insecurity: Not on file   Transportation Needs: Not on file   Physical Activity: Not on file   Stress: Not on file   Social Connections: Not on file   Intimate Partner Violence: Not on file   Housing Stability: Not on file     Family History:  Family History   Problem Relation Age of Onset     Anxiety Disorder Mother      Obesity Father      Allergies Brother      Hypertension Maternal Grandmother      Breast Cancer Maternal Grandmother      Heart Failure Maternal Grandfather      Alcoholism Maternal Grandfather      Other - See Comments Paternal Grandmother         father adopted     C.A.D. No family hx of      Diabetes No family hx of      Cerebrovascular Disease No family hx of      Cancer - colorectal No family hx of        ROS:  10-point ROS negative except as in HPI     PHYSICAL EXAM:  Temp 97.9  F (36.6  C) (Oral)   LMP 02/24/2022   Gen: NAD, well appearing  Chest: Non-labored breathing  Abdomen: gravid    Labs:   Lab Results   Component Value Date    ABO A 12/05/2020    RH Neg 12/05/2020    AS Negative 05/06/2022    HEPBANG Nonreactive 05/06/2022    CHPCRT Negative 04/20/2020     GCPCRT Negative 2020    HGB 11.6 (L) 08/15/2022       Lab Results   Component Value Date    PAP NIL 01/15/2021     FHT: 135, moderate variability, accelerations present, no decelerations  [place]: Quiet    ASSESSMENT:  32 year old y.o.  at 29w4d by LMP, consistent with 10w1d ultrasound admitted for further management of L4-L5 disc protrusion with resulting severe spinal stenosis. Neurosurgery team is planning laminectomy tomorrow.    RECOMMENDATIONS:  1) BMZ course recommended (12 mg IM now with repeat dose in 24 hours)  2) Fetal monitoring preoperatively, during surgery as feasible, as well as in the PACU - our team will arrange once timing of surgery is known  3)  section consent signed in the event urgent delivery required  4) Anesthesiology consultation  5) Position on Jose Juan table, ensuring no compression on the gravid uterus while in prone position    Lashon Joel MD  Specialist in Maternal-Fetal Medicine    2022 , 10:58 PM

## 2022-09-20 NOTE — PLAN OF CARE
Pt arrived for observation overnight before back surgery in the AM. VSS, afebrile, alert and oriented x4. Tracing reactive with positive accels and moderate variability. See flowsheets for documentation. Pt declines LOF or bleeding. Pt denies headaches, vision changes, or RUQ pain. Pt received 1st of betamethasone at 0017. Plan per neurosurgery is do surgery in the morning. Pt resting with support person at bedside.

## 2022-09-20 NOTE — ANESTHESIA PROCEDURE NOTES
Airway         Procedure Start/Stop Times: 9/20/2022 3:49 PM  Staff -        CRNA: Nora Haney APRN CRNA       Performed By: CRNA  Consent for Airway        Urgency: elective  Indications and Patient Condition       Indications for airway management: elaine-procedural       Induction type:RSI       Mask difficulty assessment: 0 - not attempted    Final Airway Details       Final airway type: endotracheal airway       Successful airway: ETT - single  Endotracheal Airway Details        ETT size (mm): 7.0       Cuffed: yes       Successful intubation technique: video laryngoscopy       VL Blade Size: MAC 3       Grade View of Cords: 1       Adjucts: stylet       Position: Right       Secured at (cm): 21       Bite block used: Soft    Post intubation assessment        Placement verified by: capnometry, equal breath sounds and chest rise        Number of attempts at approach: 1       Secured with: silk tape       Ease of procedure: easy       [place]: Intact and Unchanged    Medication(s) Administered   Medication Administration Time: 9/20/2022 3:49 PM

## 2022-09-20 NOTE — H&P
[facility]       NEUROSURGERY H&P NOTE    Reason for Admission: Patient was accepted as transfer from [facility] under Neurosurgery for evaluation of RLE sensory changes in setting of disc herniation.    HPI: Indigo Lamb is a 32 year old  female with no significant PMHx who presented to [place] ED for evaluation of acute right lower extremity radiating pain and numbness with MRI demonstrating L4-5 disc herniation, for which she was transferred to [facility] for Neurosurgical evaluation and care.     Patient states that she has had progressively worsening low back pain for past three weeks. She does not recall any specific inciting event. 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 She describes it has tight, constant, with occasional shock like pain. She states that since last Thursday she started experiencing shooting pain down her left leg, traveling down her buttocks, posterior thigh and posterior leg. She denies ever experiencing this sensation before. She did some stretches on  that a friend who is a PT recommended but did not have much improvement. On Monday, she states that she felt a sudden shooting pain down her right leg, similar to her left sided symptoms but with increased intensity and accompanied by sensory changes she describes as numbness and heaviness of her leg and coolness of her foot. This prompted her visit to ED.     She currently has ongoing bilateral shooting pain down her legs, right greater than left, and ongoing sensory changes in her right leg. She denies any explicit weakness. She denies bowel or bladder incontinence but describes feeling like she is holding in because her back is so tight. She denies saddle anesthesia.    She denies being on any antiplatelet or anticoagulation therapy.      PAST  "MEDICAL HISTORY:   Past Medical History:   Diagnosis Date    Post partum depression 2020       PAST SURGICAL HISTORY:   Past Surgical History:   Procedure Laterality Date    HC TOOTH EXTRACTION W/FORCEP      wisdom teeth    SURGICAL HISTORY OF -   2008    Bone-patellar-bone left knee anterior cruciate ligament reconstruction       FAMILY HISTORY:   Family History   Problem Relation Age of Onset    Anxiety Disorder Mother     Obesity Father     Allergies Brother     Hypertension Maternal Grandmother     Breast Cancer Maternal Grandmother     Heart Failure Maternal Grandfather     Alcoholism Maternal Grandfather     Other - See Comments Paternal Grandmother         father adopted    C.A.D. No family hx of     Diabetes No family hx of     Cerebrovascular Disease No family hx of     Cancer - colorectal No family hx of        SOCIAL HISTORY:   Social History     Tobacco Use    Smoking status: Never Smoker    Smokeless tobacco: Never Used    Tobacco comment: parents smoke   Substance Use Topics    Alcohol use: Not Currently     Comment: rare-quit with pregnancy       MEDICATIONS:  Medications Prior to Admission   Medication Sig Dispense Refill Last Dose    clindamycin (CLINDAMAX) 1 % external gel Apply topically 2 times daily 60 g 2     Prenatal Vit-Fe Fumarate-FA (PRENATAL PO)        sertraline (ZOLOFT) 50 MG tablet Take 1 tablet (50 mg) by mouth daily 90 tablet 3        Allergies:  Allergies   Allergen Reactions    Pcn [Penicillins] Rash    Zithromax [Azithromycin Dihydrate] Rash       ROS: 10 point ROS were all negative except for pertinent positives noted in my HPI.    Physical exam:   Blood pressure 115/72, temperature 97.9  F (36.6  C), temperature source Oral, height 1.651 m (5' 5\"), weight 92.5 kg (204 lb), last menstrual period 02/24/2022, not currently breastfeeding.  CV: HR and BP as noted above  PULM: breathing comfortably on room air  ABD: soft, non-distended, fetal monitor in place  MSK: no tenderness to " palpation in lower lumbar region  NEUROLOGIC:  -- Awake; Alert; oriented x 3  -- Follows commands briskly  -- +repetition, calculation, and naming  -- Speech fluent, spontaneous. No aphasia or dysarthria.  -- no gaze preference. No apparent hemineglect.  Cranial Nerves:  -- visual fields full to confrontation, PERRL 3-2mm bilat and brisk, extraocular movements intact  -- face symmetrical, tongue midline  -- sensory V1-V3 intact bilaterally  -- palate elevates symmetrically, uvula midline  -- hearing grossly intact bilat  -- Trapezii 5/5 strength bilat symmetric  -- Cerebellar: Finger nose finger without dysmetria, intact rapid alternating motions bilaterally    Motor:  Normal bulk / tone; no tremor, rigidity, or bradykinesia.  No muscle wasting or fasciculations  No Pronator Drift     Delt Bi Tri Hand Flexion/  Extension Iliopsoas Quadriceps Hamstrings Tibialis Anterior Gastroc    C5 C6 C7 C8/T1 L2 L3 L4-S1 L4 S1   R 5 5 5 5 5 5 5 5 5   L 5 5 5 5 5 5 5 5 5     Right EHL 2/5    Positive straight leg test on left    Sensory:  intact to LT x 4 extremities; endorses reduced sensation/numbness in RLE anterior/lateral thigh, anterior/lateral leg, dorsum of foot and toes    Denies saddle anesthesia    Rectal tone intact and brisk    Reflexes:     Bi Tri BR Deyanira Pat Ach Bab    C5-6 C7-8 C6 UMN L2-4 S1 UMN   R 2+ 2+ 2+ Norm 2+ 2+ Norm   L 2+ 2+ 2+ Norm 2+ 2+ Norm     Gait: Deferred      LABS:  No lab results found in last 7 days.    No lab results found in last 7 days.    IMAGING:  MRI Lumbar w/o   IMPRESSION:  1.  Large disc extrusion at L4-L5 with possible free fragment eccentrically to the right. Marked severe canal stenosis with the residual canal measuring approximately 1 mm in AP dimension. Neurosurgical consultation and correlation for cauda equina  syndrome is recommended.    ASSESSMENT:  Indigo Lamb is a 32 year old  female with no significant PMHx who presented for evaluation of acute right lower  extremity radiating pain and numbness with MRI demonstrating L4-5 disc herniation, eccentric to the right with severe canal stenosis, with physical exam notable for right EHL weakness and lower extremity numbness.    Clinically Significant Risk Factors Present on Admission              # Coagulation Defect: INR = 1.01 (Ref range: 0.85 - 1.15) and/or PTT = 60 Seconds (Ref range: 22 - 38 Seconds) on admission, will monitor for bleeding       RECOMMENDATIONS:  Admit to floor under Neurosurgery  Add on for OR tomorrow- plan for decompression  Ok for betamethasone per OB/GYN  Fetal monitoring plan discussed with OB/GYN  Serial neuro exams  NPO at midnight  mIVF PRN while NPO  Platelets > 100,000  INR < 1.5  DVT: SCDs while in bed  Disposition: floor  PT/OT consulted    The patient was discussed with Dr. Neely, neurosurgery chief resident, and Dr. Murray, neurosurgery staff, and they agree with the above.    Anupam Marin MD, PhD  PGY-2 Neurosurgery  Pager: [phone]  I have seen this patient with the resident and formulated a plan and agree with this note.  AMP

## 2022-09-20 NOTE — PLAN OF CARE
Goal Outcome Evaluation:    Plan of Care Reviewed With: patient     Overall Patient Progress: no change       Indigo waiting for laminectomy procedure. NPO since 0000. LR infusing. FHTs category one. Reports jed evelin contractions. No obstetrical complaints or concerns.

## 2022-09-20 NOTE — BRIEF OP NOTE
[facility]    Brief Operative Note    Pre-operative diagnosis: Lumbar disc herniation [M51.26]  Post-operative diagnosis Lumbar disc herniation [M51.26]    Procedure: Procedure(s):  RIGHT HEMILAMINECTOMY,  WITH MICRODISCECTOMY L4-5  Surgeon: Surgeon(s) and Role:     * Jenn Murray MD - Primary     * Cuba Maurer MD - Assisting  Anesthesia: General   Estimated Blood Loss: 10cc    Drains: None  Specimens: * No specimens in log *  Findings:  Large disc herniation. Epidural space felt well decompressed at the end of the case.  Complications: None.  Implants: * No implants in log *

## 2022-09-20 NOTE — INTERVAL H&P NOTE
"I have reviewed the surgical (or preoperative) H&P that is linked to this encounter, and examined the patient. There are no significant changes    Clinical Conditions Present on Arrival:  Clinically Significant Risk Factors Present on Admission                 # Coagulation Defect: INR = 1.01 (Ref range: 0.85 - 1.15) and/or PTT = 60 Seconds (Ref range: 22 - 38 Seconds) on admission, will monitor for bleeding   # Obesity: Estimated body mass index is 32.72 kg/m  as calculated from the following:    Height as of this encounter: 1.651 m (5' 5\").    Weight as of this encounter: 89.2 kg (196 lb 10.4 oz).       "

## 2022-09-20 NOTE — UTILIZATION REVIEW
"Admission Status; Secondary Review Determination     Admission Date: 2022  9:47 PM       Under the authority of the Utilization Management Committee, the utilization review process indicated a secondary review on the above patient.  The review outcome is based on review of the medical records, discussions with staff, and applying clinical experience noted on the date of the review.        ()      Inpatient Status Appropriate - This patient's medical care is consistent with medical management for inpatient care and reasonable inpatient medical practice.      () Observation Status Appropriate - This patient does not meet hospital inpatient criteria and is placed in observation status. If this patient's primary payer is Medicare and was admitted as an inpatient, Condition Code 44 should be used and patient status changed to \"observation\".   () Admission Status NOT Appropriate - This patient's medical care is not consistent with medical management for Inpatient or Observation Status.        () Outpatient Procedure Status Appropriate - Procedure not on Medicare Inpatient list and no complications at the time of this review       RATIONALE FOR DETERMINATION      Brief clinical presentation, information copied from the chart, abbreviated and edited for relevant content:       Indigo Lamb is a 32 year old  at 29w5d by LMP consistent with 10w1d US admitted for further management of L4-L5 disc protrusion with resulting severe spinal stenosis. Plan for OR today for laminectomy this afternoon. She was admitted IP. Not appropriate. Advised team to change to OBS. No need to change to OP after her surgery.    In summary, the severity of illness, intensity of service provided, expected length of stay and risk for adverse outcome make the care complex, high risk and appropriate for hospital admission.        The information on this document is developed by the utilization review team in order for the business " office to ensure compliance.  This only denotes the appropriateness of proper admission status and does not reflect the quality of care rendered.         The definitions of Inpatient Status and Observation Status used in making the determination above are those provided in the CMS Coverage Manual, Chapter 1 and Chapter 6, section 70.4.      Sincerely,      Nicole Alvarez MD   Utilization Review/ Case Management  [facility].

## 2022-09-21 LAB
BLD PROD TYP BPU: NORMAL
BLD PROD TYP BPU: NORMAL
BLOOD COMPONENT TYPE: NORMAL
BLOOD COMPONENT TYPE: NORMAL
CODING SYSTEM: NORMAL
CODING SYSTEM: NORMAL
CROSSMATCH: NORMAL
CROSSMATCH: NORMAL
GLUCOSE BLDC GLUCOMTR-MCNC: 115 MG/DL (ref 70–99)
HOLD SPECIMEN: NORMAL
UNIT ABO/RH: NORMAL
UNIT ABO/RH: NORMAL
UNIT NUMBER: NORMAL
UNIT NUMBER: NORMAL
UNIT STATUS: NORMAL
UNIT STATUS: NORMAL
UNIT TYPE ISBT: 600
UNIT TYPE ISBT: 600

## 2022-09-21 PROCEDURE — 96376 TX/PRO/DX INJ SAME DRUG ADON: CPT

## 2022-09-21 PROCEDURE — 250N000013 HC RX MED GY IP 250 OP 250 PS 637: Performed by: STUDENT IN AN ORGANIZED HEALTH CARE EDUCATION/TRAINING PROGRAM

## 2022-09-21 PROCEDURE — 99024 POSTOP FOLLOW-UP VISIT: CPT | Performed by: NURSE PRACTITIONER

## 2022-09-21 PROCEDURE — 250N000013 HC RX MED GY IP 250 OP 250 PS 637: Performed by: NURSE PRACTITIONER

## 2022-09-21 PROCEDURE — 250N000011 HC RX IP 250 OP 636: Performed by: STUDENT IN AN ORGANIZED HEALTH CARE EDUCATION/TRAINING PROGRAM

## 2022-09-21 PROCEDURE — 96374 THER/PROPH/DIAG INJ IV PUSH: CPT

## 2022-09-21 PROCEDURE — G0378 HOSPITAL OBSERVATION PER HR: HCPCS

## 2022-09-21 PROCEDURE — 250N000011 HC RX IP 250 OP 636: Performed by: NURSE PRACTITIONER

## 2022-09-21 PROCEDURE — 258N000003 HC RX IP 258 OP 636

## 2022-09-21 PROCEDURE — 99231 SBSQ HOSP IP/OBS SF/LOW 25: CPT | Mod: GC | Performed by: OBSTETRICS & GYNECOLOGY

## 2022-09-21 PROCEDURE — 250N000013 HC RX MED GY IP 250 OP 250 PS 637

## 2022-09-21 PROCEDURE — 96375 TX/PRO/DX INJ NEW DRUG ADDON: CPT

## 2022-09-21 RX ORDER — LIDOCAINE 4 G/G
2 PATCH TOPICAL
Status: DISCONTINUED | OUTPATIENT
Start: 2022-09-21 | End: 2022-09-22 | Stop reason: HOSPADM

## 2022-09-21 RX ORDER — DIAZEPAM 5 MG
5 TABLET ORAL EVERY 6 HOURS PRN
Status: DISCONTINUED | OUTPATIENT
Start: 2022-09-21 | End: 2022-09-22 | Stop reason: HOSPADM

## 2022-09-21 RX ORDER — GABAPENTIN 100 MG/1
100 CAPSULE ORAL 3 TIMES DAILY PRN
Status: DISCONTINUED | OUTPATIENT
Start: 2022-09-21 | End: 2022-09-21

## 2022-09-21 RX ORDER — HYDROMORPHONE HCL IN WATER/PF 6 MG/30 ML
0.2 PATIENT CONTROLLED ANALGESIA SYRINGE INTRAVENOUS EVERY 4 HOURS PRN
Status: DISCONTINUED | OUTPATIENT
Start: 2022-09-21 | End: 2022-09-22 | Stop reason: HOSPADM

## 2022-09-21 RX ORDER — CYCLOBENZAPRINE HCL 10 MG
10 TABLET ORAL 3 TIMES DAILY PRN
Status: DISCONTINUED | OUTPATIENT
Start: 2022-09-21 | End: 2022-09-22 | Stop reason: HOSPADM

## 2022-09-21 RX ORDER — DIAZEPAM 10 MG/2ML
INJECTION, SOLUTION INTRAMUSCULAR; INTRAVENOUS
Status: CANCELLED | OUTPATIENT
Start: 2022-09-21

## 2022-09-21 RX ADMIN — ACETAMINOPHEN 975 MG: 325 TABLET, FILM COATED ORAL at 20:34

## 2022-09-21 RX ADMIN — DEXAMETHASONE 6 MG: 2 TABLET ORAL at 13:42

## 2022-09-21 RX ADMIN — DEXAMETHASONE SODIUM PHOSPHATE 6 MG: 4 INJECTION, SOLUTION INTRAMUSCULAR; INTRAVENOUS at 03:27

## 2022-09-21 RX ADMIN — ACETAMINOPHEN 975 MG: 325 TABLET, FILM COATED ORAL at 11:20

## 2022-09-21 RX ADMIN — SERTRALINE 50 MG: 25 TABLET, FILM COATED ORAL at 08:59

## 2022-09-21 RX ADMIN — ACETAMINOPHEN 975 MG: 325 TABLET, FILM COATED ORAL at 03:25

## 2022-09-21 RX ADMIN — OXYCODONE HYDROCHLORIDE 10 MG: 10 TABLET ORAL at 23:31

## 2022-09-21 RX ADMIN — DEXAMETHASONE 6 MG: 2 TABLET ORAL at 20:34

## 2022-09-21 RX ADMIN — SENNOSIDES AND DOCUSATE SODIUM 1 TABLET: 50; 8.6 TABLET ORAL at 08:29

## 2022-09-21 RX ADMIN — DIAZEPAM 5 MG: 5 TABLET ORAL at 11:10

## 2022-09-21 RX ADMIN — SODIUM CHLORIDE, POTASSIUM CHLORIDE, SODIUM LACTATE AND CALCIUM CHLORIDE: 600; 310; 30; 20 INJECTION, SOLUTION INTRAVENOUS at 08:39

## 2022-09-21 RX ADMIN — OXYCODONE HYDROCHLORIDE 10 MG: 10 TABLET ORAL at 08:59

## 2022-09-21 RX ADMIN — CYCLOBENZAPRINE 10 MG: 10 TABLET, FILM COATED ORAL at 17:42

## 2022-09-21 RX ADMIN — CYCLOBENZAPRINE 10 MG: 10 TABLET, FILM COATED ORAL at 09:30

## 2022-09-21 RX ADMIN — HYDROMORPHONE HYDROCHLORIDE 0.2 MG: 0.2 INJECTION, SOLUTION INTRAMUSCULAR; INTRAVENOUS; SUBCUTANEOUS at 09:30

## 2022-09-21 RX ADMIN — LIDOCAINE PATCH 4% 2 PATCH: 40 PATCH TOPICAL at 11:10

## 2022-09-21 RX ADMIN — DEXAMETHASONE 6 MG: 2 TABLET ORAL at 23:31

## 2022-09-21 RX ADMIN — DEXAMETHASONE SODIUM PHOSPHATE 6 MG: 4 INJECTION, SOLUTION INTRAMUSCULAR; INTRAVENOUS at 08:29

## 2022-09-21 RX ADMIN — SENNOSIDES AND DOCUSATE SODIUM 1 TABLET: 50; 8.6 TABLET ORAL at 20:34

## 2022-09-21 RX ADMIN — PRENATAL VITAMINS-IRON FUMARATE 27 MG IRON-FOLIC ACID 0.8 MG TABLET 1 TABLET: at 08:29

## 2022-09-21 RX ADMIN — POLYETHYLENE GLYCOL 3350 17 G: 17 POWDER, FOR SOLUTION ORAL at 08:29

## 2022-09-21 RX ADMIN — OXYCODONE HYDROCHLORIDE 10 MG: 10 TABLET ORAL at 17:31

## 2022-09-21 RX ADMIN — OXYCODONE HYDROCHLORIDE 10 MG: 10 TABLET ORAL at 03:25

## 2022-09-21 ASSESSMENT — ACTIVITIES OF DAILY LIVING (ADL)
ADLS_ACUITY_SCORE: 20

## 2022-09-21 NOTE — PROGRESS NOTES
RN to bedside for 20 minutes NST.  Pt in a lot of pain, able to tolerate NST on her left side.  Denies bleeding and leaking, endorses fetal movement.  Abdomen soft, nontender.  Reports irregular tightening lasting 10-20 seconds, irregular irritability seen on toco.  Normal baseline, moderate variability, accelerations present, no decelerations.  Reviewed with YULISA Matos RN to verify reactive NST.

## 2022-09-21 NOTE — PLAN OF CARE
"BP 99/60 (BP Location: Left arm)   Pulse 85   Temp 98.6  F (37  C) (Oral)   Resp 19   Ht 1.651 m (5' 5\")   Wt 89.2 kg (196 lb 10.4 oz)   LMP 02/24/2022   SpO2 97%   BMI 32.72 kg/m      Respiratory: Lungs clear with a normal rate, denies SOB. capnography monitoring. continuous pulse ox , SPO2 >90's.     Cardio: Pulse stable throughout shift, denies pain     GI/: Pt gets up to use the commode and reported feeling like she wasn't emptying her bladder. Pt voided twice on shift, 250 ml each time. PVR x2 117cc and 56cc. Pt not passing gas denies abdominal discomfort, bowel sounds audible     Neuro: A&O x 4. Weakness, numbness, and tingling reported on right leg. Pt ambulated to bathroom with stand by assist     Pain: gave pt oxycodone tylenol and provided ice packs to relieve pain     Patient's  has been present and sleeping in the room   "

## 2022-09-21 NOTE — ANESTHESIA POSTPROCEDURE EVALUATION
Patient: Indigo Lamb    Procedure: Procedure(s):  RIGHT HEMILAMINECTOMY,  WITH MICRODISCECTOMY L4-5       Anesthesia Type:  General    Note:  Disposition: Inpatient   Postop Pain Control: Uneventful            Sign Out: Well controlled pain   PONV: No   Neuro/Psych: Uneventful            Sign Out: Acceptable/Baseline neuro status   Airway/Respiratory: Uneventful            Sign Out: Acceptable/Baseline resp. status   CV/Hemodynamics: Uneventful            Sign Out: Acceptable CV status; No obvious hypovolemia; No obvious fluid overload   Other NRE: NONE   DID A NON-ROUTINE EVENT OCCUR? No    Event details/Postop Comments:  FHT stable throughout case and afterwards monitored by L and D nurse           Last vitals:  Vitals Value Taken Time   /68 09/20/22 2000   Temp 36.8  C (98.3  F) 09/20/22 1915   Pulse 135 09/20/22 2016   Resp 22 09/20/22 2016   SpO2 99 % 09/20/22 2007   Vitals shown include unvalidated device data.    Electronically Signed By: Liz Cervantes MD  September 20, 2022  8:35 PM

## 2022-09-21 NOTE — PLAN OF CARE
Neuro surgery paged on patients output and PVR. Received  call by Neuro resident and he stated output OK but continue to monitor. Resident updated on patients pain  level. No further orders obtained. Will continue to monitor pain.

## 2022-09-21 NOTE — OR NURSING
Complains of right leg, foot numbness and shooting pain down leg with weakness.  States this is different from yesterday. Neuro surgery resident here to check on patient. Said patient was ok to go to room. Stood at bedside and lifted legs back and forth, sat back down, laid down. Laid on side and had difficulty abducting knees, weakness on the right with abducting. Left leg is strong.

## 2022-09-21 NOTE — PLAN OF CARE
"Pt reported pain 10/10 from incision site radiating down right leg.  Pt reports right leg completely numb, can control leg but cannot feel it besides \"shooting pain.\"  Also reports tightness in lower back.  Given oxy 10mg, Dilaudid 0.2 mg, flexiril 5 mg, valium 5 mg, 2 lidocaine patches and decadron.      Dressing CDI.  Lung sounds clear.  BP in 90s/60s, pt stated this is low for her.  Denies dizziness, asymptomatic, understands this may be because she's been pretty inactive all day.    Pt reported difficulty voiding/spasms, doesn't feel like she empties the entire bladder.  Voided 10 am, bladder scan not completed until 1043 due to doctor in room.  Voided again around 1230, missed hat so only caught 100 mL, residual 445 mL.  Paged  per request.    Continue to manage pain, assist with voiding, and encouraging intake.    "

## 2022-09-21 NOTE — PLAN OF CARE
Patient C/O Decreased Fetal Movment, this RN at bedside Doptone fetus heart rate at 130 acceleration to 135 no decelerations noted.

## 2022-09-21 NOTE — OP NOTE
Procedure Date: 09/20/2022    PREOPERATIVE DIAGNOSES:  Lumbar 4-5 disk herniation with radiculopathy.    POSTOPERATIVE DIAGNOSES:  Lumbar 4-5 disk herniation with radiculopathy.    PROCEDURES PERFORMED:    1.  Right hemilaminectomy with microdiscectomy at lumbar 4-5.  2.  Use of intraoperative microscope.    ATTENDING SURGEON:  Jenn Murray MD, PhD    RESIDENT SURGEON:  Cuba Maurer MD    ANESTHESIA:  General.    ESTIMATED BLOOD LOSS:  10 mL    INTRAOPERATIVE FINDINGS:  Large disk herniation was removed en bloc.  Epidural space felt well decompressed at the end of the case.    INDICATIONS FOR PROCEDURE:  Ms. Lamb is a 32-year-old female who is approximately 30 weeks pregnant, presented with progressively worsening lower back pain for the past 3 weeks.  She had an acute change in her symptoms with new right lower extremity radiating pain, numbness, tingling, and weakness in the foot.  MRI demonstrated a large acute disk herniation at the L4-L5 level.  Given the patient's weakness, she was offered the aforementioned procedure.  OB was involved in the procedure for monitoring of the fetus.  After thorough conversation about the risks and benefits of surgery, the patient elected to move forward with the offered surgical procedure.    DESCRIPTION OF PROCEDURE:  The patient was marked and consented in the preoperative area.  She was brought to the operating room where general anesthesia was induced and the patient was intubated.  Fetal heart rate monitor was fit for the patient during the induction and intubation process.  The patient was then rotated prone on a 4-poster bed and arms were brought forward.  All pressure points were adequately padded.  Our OB colleagues confirmed that the uterus was hanging freely and not being laid on.  Fetal heart rate monitor was adjusted by our OB colleagues so monitorable signals were obtained.  We then localized the L4-L5 disk level and marked it.  The patient was then prepped and  draped in normal sterile fashion.  Spinal needle was inserted and an intraoperative flat plate x-ray was obtained.  This appeared to be 1 level too high, so the needle was readjusted lower enough final flat plate x-ray was obtained.  This marked the midpoint of her incision.  From here, we planned our incision.  A 10 mL of local anesthetic with epinephrine was injected along the intended incision line.  Time-out was called, confirming the patient's identity, laterality, procedure and procedure intended.      A #10 blade used to open the skin.  Monopolar cautery was used to carry dissection down through the subcuticular tissues and the fascia was opened to the right of the spinous processes.  We then performed a subperiosteal dissection, dissecting out the lateral wall of the spinous process on the right side at the suspected L4-L5 levels.  This was dissected out towards the facet and we were careful not to enter the facet capsule.  Once we were satisfied with the degree of exposure, a Devon was placed underneath the lamina and a needle was inserted into the facet joint at one of the levels.  Intraoperative flat plate was obtained demonstrating that this was at the L5 level.  We then marked the L4 level.  Microscope was then brought to the field and the remainder of the procedure was performed using microsurgical technique.  The high-speed drill was then used to create a window within the lamina.  The ligamentum flavum had been encountered and we followed this out laterally to ensure that we were far enough lateral from the canal space.  Once the bony decompression had been achieved, the ligament was opened using a combination of curettes and Kerrisons.  The dura was immediately visible.  We followed this out laterally to ensure that we had adequate lateral exposure.  Disk space was palpable and opened with a #15 blade.  We palpated within the disk space using a hook and no immediate disk fragments were immediately  palpable.  We then palpated within the epidural space and underneath the thecal sac using a Devon.  We were able to identify what felt like a large mass.  This was then fished out using a nerve hook.  The pituitary was used to grab a piece of the disk material as the presented themselves.  Initially, 2 small fragments were removed.  The third fragment was very large and correlated with the amount of disk noted on the MRI.  We then palpated within the epidural space and everything felt well decompressed.  The wound was then copiously irrigated and hemostasis was obtained.    We then turned our attention to closure.  The fascia was closed using 0 Vicryl sutures in a simple interrupted fashion.  Multiple adipose layers were closed using 0 Vicryl sutures in a horizontal mattress fashion.  Subdermal layer was closed using 0 Vicryl sutures in horizontal mattress fashion.  Dermis was closed using 2-0 Vicryl sutures in an inverted interrupted fashion.  Skin was closed using 3-0 Monocryl in a running subcuticular fashion.  The wound was cleaned with wet followed by dry sponge.  Exofin was applied to the incision.  Once the existing had dried, the wound was dressed with a Primapore dressing.  Drapes were taken down and the patient was rotated back supine on a hospital bed.  General anesthesia was gently reversed and the patient was extubated.  The patient was taken to the postoperative care area for further postoperative cares.  All counts were correct at the end of the procedure x2.  Our OB colleagues indicated that there were no monitoring changes throughout the course of the procedure.  Dr. Jenn Murray was present and scrubbed throughout all critical portions of the case and otherwise immediately available.        Jenn Murray MD    As Dictated by GLADYS DUQUE MD        D: 2022   T: 2022   MT: BILL    Name:     CLARA SAUCEDO  MRN:      [id number]        Account:        [id number]   :       1990           Procedure Date: 09/20/2022     Document: [id number]  I was present for the critical portions of the operation and immediately available for the remainder.  AMP

## 2022-09-21 NOTE — PROGRESS NOTES
MFM Antepartum Progress Note    Subjective:   Having a lot of postoperative pain today. Feels significant numbness and tingling all down right leg, worse than before surgery. Was told by neurosurgery resident that her strength had improved, she but doesn't feel that way. Has some muscle spasms and feels like everything is very tight.     Objective:  Vitals:    22 2200 22 2230 22 2300 22 0031   BP: (!) 130/100 (!) 89/51 91/50 99/60   BP Location: Left arm Left arm Left arm Left arm   Patient Position:       Cuff Size:       Pulse: 103 107 101 85   Resp:    Temp:    98.6  F (37  C)   TempSrc:    Oral   SpO2: 100% 98% 100% 97%   Weight:       Height:         I/O last 3 completed shifts:  In: 1991 [P.O.:400; I.V.:1541; IV Piggyback:50]  Out: 1010 [Urine:1000; Blood:10]    Gen: Resting comfortably in bed, NAD  CV: Warm and well perfused   Resp: Breathing comfortably on room air   Abd: Gravid    Assessment/Plan:   Indigo Lamb is a 32 year old  at 29w6d by LMP consistent with 10w1d US admitted for further management of L4-L5 disc protrusion with resulting severe spinal stenosis, now POD#1 s/p hemilaminectomy and microdiscectomy. Pregnancy has otherwise been largely uncomplicated.      # L4-L5 spinal stenosis  - Neurosurgery primary; will continue following peripherally  - Reviewed significant post-operative pain with neurosurgery team, they are coming to evaluate for possible post-operative epidural hematoma. They are okay with our team adding additional pain medications at this time prior to their assessment.    - Post-operative pain management in pregnancy:   - Continue scheduled acetaminophen 975 mg q8h   - Continue PO oxycodone 5-10 mg q4h prn   - Add IV Dilaudid 0.2 mg q4h prn (ord'd)  - Add PO Flexeril 10 mg tid prn (ord'd)  - Could consider adding Valium or gabapentin for ongoing pain      # Fetal Well-Being   - s/p BMZ dose 1  at 0017, started on dexamethasone on  9/20 per neurosurgery and will receive 6 mg q6hrs through today   - BID EFM while inpatient   - Rh negative, s/p Rhogam 9/7    Thank you for involving our team in the care of this patient. Maternal Fetal Medicine will continue to follow. Please page at any time with questions or concerns; [phone].     Patient seen and discussed with Dr. Joel.     Sarah Sutherland MD  OB/GYN Resident PGY-3  9:29 AM September 21, 2022      Physician Attestation   I saw this patient with the resident and agree with the resident/fellow's findings and plan of care as documented in the note.      I personally reviewed vital signs, medications, labs and imaging.    Russo findings: Indigo reports increased pain and numbness in the right lower extremity pain. She notes that she is unable to stand. Also feels like muscles are tight. No obstetric concerns.     We discussed pain medications as outlined above that can be used in pregnancy. Neurosurgery to evaluate this am. Will continue BID fetal monitoring while inpatient. Recommend Anesthesiology consultation at around 32-34 weeks gestation to review anesthesia options with delivery.    Lashon Joel MD  Date of Service (when I saw the patient): 09/21/22

## 2022-09-21 NOTE — OR NURSING
PACU to Inpatient Nursing Handoff    Patient Indigo Lamb is a 32 year old female who speaks English.   Procedure Procedure(s):  RIGHT HEMILAMINECTOMY,  WITH MICRODISCECTOMY L4-5   Surgeon(s) Primary: Jenn Murray MD  Assisting: Cuba Maurer MD     Allergies   Allergen Reactions     Pcn [Penicillins] Rash     Zithromax [Azithromycin Dihydrate] Rash       Isolation  [unfilled]     Past Medical History   has a past medical history of Post partum depression (2020).    Anesthesia General   Dermatome Level     Preop Meds Not applicable   Nerve block Not applicable   Intraop Meds fentanyl (Sublimaze): 75 mcg total  ondansetron (Zofran): last given at 1739   Local Meds Yes   Antibiotics clindamycin (Cleocin) - last given at 1527     Pain Patient Currently in Pain: yes   PACU meds  acetaminophen (Tylenol): 975 mg (total dose) last given at 1901   dexamethasone (Decadron): 6 mg (total dose) last given at 1929   fentanyl (Sublimaze): 100 mcg (total dose) last given at 1913   oxycodone (Roxicodone): 5 mg (total dose) last given at 1856    PCA / epidural No   Capnography     Telemetry ECG Rhythm: Sinus rhythm   Inpatient Telemetry Monitor Ordered? No        Labs Glucose Lab Results   Component Value Date    GLC 95 07/23/2018       Hgb Lab Results   Component Value Date    HGB 11.6 08/15/2022    HGB 12.0 12/03/2020       INR Lab Results   Component Value Date    INR 1.01 09/20/2022    INR 0.97 03/21/2011      PACU Imaging Not applicable     Wound/Incision Incision/Surgical Site 09/20/22 Lower;Midline Back (Active)   Incision Assessment [facility] 09/20/22 1821   Closure Approximated;Liquid bandage 09/20/22 1738   Dressing Intervention Clean, dry, intact 09/20/22 1821   Number of days: 0      CMS        Equipment ice pack   Other LDA       IV Access Peripheral IV 09/20/22 Anterior;Right Lower forearm (Active)   Site Assessment [facility] 09/20/22 1821   Line Status Infusing 09/20/22 1821   Phlebitis Scale 0-->no symptoms  09/20/22 1821   Infiltration Scale 0 09/20/22 1821   Number of days: 0       Peripheral IV 09/20/22 Left Hand (Active)   Site Assessment WDL 09/20/22 1821   Line Status Saline locked 09/20/22 1821   Phlebitis Scale 0-->no symptoms 09/20/22 1821   Number of days: 0      Blood Products plasmalyte EBL 10 mL   Intake/Output Date 09/20/22 0700 - 09/21/22 0659   Shift 3416-3072 9272-1794 7558-7038 24 Hour Total   INTAKE   I.V.  1300  1300   IV Piggyback  50  50   Shift Total(mL/kg)  1350(15.13)  1350(15.13)   OUTPUT   Blood  10  10   Shift Total(mL/kg)  10(0.11)  10(0.11)   Weight (kg) 89.2 89.2 89.2 89.2      Drains / Rice     Time of void PreOp      PostOp      Diapered? No   Bladder Scan     PO    tolerating sips     Vitals    B/P: 110/68  T: 98.3  F (36.8  C)    Temp src: Oral  P:  Pulse: 108 (09/20/22 1915)          R: 15  O2:  SpO2: 98 %    O2 Device: None (Room air) (09/20/22 0602)              Family/support present mother   Patient belongings     Patient transported on cart   DC meds/scripts (obs/outpt) Not applicable   Inpatient Pain Meds Released? Yes       Special needs/considerations None   Tasks needing completion None       Nora Avalos, RN  ASCOM [id number]

## 2022-09-21 NOTE — OR NURSING
Up to commode to try to void. Unable to void. Bladder scan for 176cc. Doesn't feel like she needs to void. Up to room via cart.

## 2022-09-21 NOTE — PLAN OF CARE
"BP 91/50 (BP Location: Left arm)   Pulse 101   Temp 98.4  F (36.9  C) (Oral)   Resp 22   Ht 1.651 m (5' 5\")   Wt 89.2 kg (196 lb 10.4 oz)   LMP 02/24/2022   SpO2 100%   BMI 32.72 kg/m    Pt arrived on the unit around 9. 35 pm. Vitals triggered sepsis. Lactic acid results 1.3. Over night RN updated.   Pt is alert and oriented x4. Denies SOB, chest pain, dizziness, headache or fever. C/o of lower back pain, radiating to right leg. Oxycodone PRN given, cold applied. Has numbness and tingling RLE, not new after surgery.  Pt has not voided yet, bladder scanned for 184 ml. Fluids encouraged. Back incision dressing C/D/I. Ambulated in room from cart to bed. On regular diet, ate a few bites of pizza. Denies nausea/vomiting.    Pt reported decreased fetal movement L&D RN called to assess pt. See note.   Pt's  in room allowed to stay by ANS.  Continue to monitor per POC.  "

## 2022-09-21 NOTE — PROGRESS NOTES
"[facility], [place]   Neurosurgery Progress Note:    Date of service: 9/21/2022    Assessment: Indigo Lamb is a 32 year old female  s/p L4-5 microdiscectomy on 9/20/2022 with Dr. Jenn Murray. she has been clinically stable but complains of ongoing pain   2022  Clinically Significant Risk Factors Present on Admission              # Obesity: Estimated body mass index is 32.72 kg/m  as calculated from the following:    Height as of this encounter: 1.651 m (5' 5\").    Weight as of this encounter: 89.2 kg (196 lb 10.4 oz).             Plan:  - Neuro checks/vital signs: Q4 hours  - SBP: <160  - Pain control: PRN oxycodone, valium, tylenol   - Steroids/Keppra: Decadron complete  - Activity: up with assist   - Restrictions/Bracing: No repetitive bending/twisting, no lifting >10 lbs  - Diet: regular  - Drain: none  - DVT prophylaxis: SCDs  - PT/OT: home with assist   - Repeat post void residual at 1400 today  - Follow-up plan upon discharge:  Follow-up in 2 weeks in Neurosurgery clinic   - Disposition:  Home once pain controlled        ZUNILDA Hagen CNP  9/21/2022  Department of Neurosurgery  Pager: [phone]    I have spent a total of 25 minutes total time counseling, coordination, and chart review, over 50% of the time was spent in direct patient care.       Interval History:  Patient continues to note back and right buttock and posterior leg pain. Noted to be retaining urine this morning.         Objective:   Temp:  [98.3  F (36.8  C)-98.6  F (37  C)] 98.6  F (37  C)  Pulse:  [] 101  Resp:  [9-22] 20  BP: ()/() 96/72  SpO2:  [96 %-100 %] 98 %  I/O last 3 completed shifts:  In: 1991 [P.O.:400; I.V.:1541; IV Piggyback:50]  Out: 1010 [Urine:1000; Blood:10]    Gen: Appears comfortable, NAD  Wound: Incision, clean, dry, intact without strikethrough  Neurologic:  - Alert & Oriented to person, place, time, and situation  - Follows commands briskly  - Speech fluent, " spontaneous. No aphasia or dysarthria.  - No gaze preference. No apparent hemineglect.  - PERRL, EOMI  - Strong eye closure, jaw clench, and cheek puff  - Face symmetric with sensation intact to light touch  - Palate elevates symmetrically, uvula midline, tongue protrudes midline  - Trapezii and sternocleidomastoid muscles 5/5 bilaterally  - No pronator drift     Del Tr Bi WE WF Gr   R 5 5 5 5 5 5   L 5 5 5 5 5 5    HF KE KF DF PF EHL   R 4 5 5 5 4+ 4-   L 5 5 5 5 5 5     Reflexes 2+ throughout    Sensation intact and symmetric to light touch throughout    LABS  Recent Labs   Lab Test 08/15/22  1650 05/06/22  1446 12/03/20  2110   WBC 8.5 7.4 11.7*   HGB 11.6* 13.0 12.0   MCV 92 91 89    204 203       Recent Labs   Lab Test 09/21/22  0533 07/23/18  1326   NA  --  139   POTASSIUM  --  4.3   CHLORIDE  --  107   CO2  --  26   BUN  --  14   CR  --  0.82   ANIONGAP  --  6   KATY  --  8.9   * 95       IMAGING    Recent Results (from the past 24 hour(s))   XR Lumbar Spine Port 1 View    Narrative    EXAM: XR CROSSTABLE LATERAL LUMBAR SPINE PORTABLE  LOCATION: [facility]  DATE/TIME: 9/20/2022 4:30 PM    INDICATION: L4 L5 hemilaminectomy w microdiskectomy  COMPARISON:  MRI lumbar spine 09/19/2022.  TECHNIQUE: CR Lumbar Spine.      Impression    IMPRESSION: Crosstable lateral intraoperative radiograph with the probe overlies the dorsal soft tissues at the level of L3.   XR Lumbar Spine Port 1 View    Narrative    EXAM: XR CROSSTABLE LATERAL LUMBAR SPINE PORTABLE  LOCATION: [facility]  DATE/TIME: 9/20/2022 4:33 PM    INDICATION: HEMILAMINECTOMY, SPINE, LUMBAR, 1 LEVEL, WITH DISCECTOMY L4 5 BILATERAL HEMILAMINECTOMY W  MICRODISCECTOMY, POSSIBLE LAMINECTOMY  COMPARISON:  MRI lumbar spine 09/19/2022 and earlier same day lumbar radiograph.  TECHNIQUE: CR Lumbar Spine.      Impression    IMPRESSION: Crosstable lateral  intraoperative radiograph. The probe has been repositioned in the interim overlying the dorsal soft tissues at the level of L4-L5.     XR Lumbar Spine Port 1 View    Narrative    EXAM: XR CROSSTABLE LATERAL LUMBAR SPINE PORTABLE  LOCATION: [facility]  DATE/TIME: 9/20/2022 4:48 PM    INDICATION: L4-L5 hemilaminectomy  COMPARISON: X-ray 09/20/2022  TECHNIQUE: CR Lumbar Spine.      Impression    IMPRESSION: Single lateral view of the lumbar spine patient prone. Intraoperative changes within the posterior paraspinal soft tissues with multiple surgical instruments. A spinal needle seen previously has been removed. A surgical probe now overlies   posterior elements at the lower L5 vertebral/L5-S1 disc level.

## 2022-09-21 NOTE — PHARMACY-ADMISSION MEDICATION HISTORY
Admission Medication History Completed by Pharmacy    See [facility] Admission Navigator for allergy information, preferred outpatient pharmacy, prior to admission medications and immunization status.     Medication History Sources:     patient    Changes made to PTA medication list (reason):    Added: None    Deleted: None    Changed: clindamycin from bid to prn per patient    Additional Information:    None    Prior to Admission medications    Medication Sig Last Dose Taking? Auth Provider Long Term End Date   clindamycin (CLINDAMAX) 1 % external gel Apply topically 2 times daily  Patient taking differently: Apply topically 2 times daily as needed (acne) More than a month at Unknown time prn Rocco Weston MD     Prenatal Vit-Fe Fumarate-FA (PRENATAL PO) Take 1 tablet by mouth daily 9/19/2022 at AM yes Reported, Patient No    sertraline (ZOLOFT) 50 MG tablet Take 1 tablet (50 mg) by mouth daily 9/19/2022 at AM yes Rocco Weston MD Yes        Date completed: 09/21/22    Medication history completed by: Sandoval Valles RPH

## 2022-09-21 NOTE — PLAN OF CARE
Late entry due to patient care.  Continuous EFM during surgery and for 30 minutes in PACU.  Dr España reviewed strip and stated can stop continuous EFM.

## 2022-09-22 VITALS
WEIGHT: 196.65 LBS | SYSTOLIC BLOOD PRESSURE: 109 MMHG | HEART RATE: 93 BPM | HEIGHT: 65 IN | TEMPERATURE: 97.9 F | DIASTOLIC BLOOD PRESSURE: 73 MMHG | RESPIRATION RATE: 16 BRPM | OXYGEN SATURATION: 100 % | BODY MASS INDEX: 32.76 KG/M2

## 2022-09-22 LAB — GLUCOSE BLDC GLUCOMTR-MCNC: 118 MG/DL (ref 70–99)

## 2022-09-22 PROCEDURE — 250N000011 HC RX IP 250 OP 636: Performed by: NURSE PRACTITIONER

## 2022-09-22 PROCEDURE — 99224 PR SUBSEQUENT OBSERVATION CARE,LEVEL I: CPT | Mod: 25 | Performed by: OBSTETRICS & GYNECOLOGY

## 2022-09-22 PROCEDURE — 59025 FETAL NON-STRESS TEST: CPT | Mod: 26 | Performed by: OBSTETRICS & GYNECOLOGY

## 2022-09-22 PROCEDURE — 250N000013 HC RX MED GY IP 250 OP 250 PS 637: Performed by: NURSE PRACTITIONER

## 2022-09-22 PROCEDURE — 250N000013 HC RX MED GY IP 250 OP 250 PS 637: Performed by: STUDENT IN AN ORGANIZED HEALTH CARE EDUCATION/TRAINING PROGRAM

## 2022-09-22 PROCEDURE — 82962 GLUCOSE BLOOD TEST: CPT

## 2022-09-22 PROCEDURE — 250N000013 HC RX MED GY IP 250 OP 250 PS 637

## 2022-09-22 PROCEDURE — G0378 HOSPITAL OBSERVATION PER HR: HCPCS

## 2022-09-22 RX ORDER — AMOXICILLIN 250 MG
1 CAPSULE ORAL 2 TIMES DAILY
Qty: 20 TABLET | Refills: 0 | Status: SHIPPED | OUTPATIENT
Start: 2022-09-22 | End: 2022-11-02

## 2022-09-22 RX ORDER — OXYCODONE HYDROCHLORIDE 5 MG/1
5 TABLET ORAL EVERY 4 HOURS PRN
Qty: 15 TABLET | Refills: 0 | Status: SHIPPED | OUTPATIENT
Start: 2022-09-22 | End: 2022-10-06

## 2022-09-22 RX ORDER — DIAZEPAM 5 MG
5 TABLET ORAL EVERY 8 HOURS PRN
Qty: 8 TABLET | Refills: 0 | Status: SHIPPED | OUTPATIENT
Start: 2022-09-22 | End: 2022-09-28

## 2022-09-22 RX ORDER — ONDANSETRON 4 MG/1
4 TABLET, ORALLY DISINTEGRATING ORAL EVERY 6 HOURS PRN
Qty: 15 TABLET | Refills: 0 | Status: SHIPPED | OUTPATIENT
Start: 2022-09-22 | End: 2022-10-06

## 2022-09-22 RX ORDER — LIDOCAINE 4 G/G
2 PATCH TOPICAL EVERY 24 HOURS
Qty: 3 PATCH | Refills: 0 | Status: SHIPPED | OUTPATIENT
Start: 2022-09-22 | End: 2022-10-06

## 2022-09-22 RX ORDER — POLYETHYLENE GLYCOL 3350 17 G/17G
17 POWDER, FOR SOLUTION ORAL DAILY
Qty: 20 PACKET | Refills: 0 | Status: SHIPPED | OUTPATIENT
Start: 2022-09-23 | End: 2022-11-02

## 2022-09-22 RX ORDER — CYCLOBENZAPRINE HCL 10 MG
10 TABLET ORAL 3 TIMES DAILY PRN
Qty: 15 TABLET | Refills: 0 | Status: SHIPPED | OUTPATIENT
Start: 2022-09-22 | End: 2022-09-28

## 2022-09-22 RX ORDER — ACETAMINOPHEN 325 MG/1
975 TABLET ORAL EVERY 8 HOURS
Qty: 45 TABLET | Refills: 0 | Status: SHIPPED | OUTPATIENT
Start: 2022-09-22 | End: 2023-01-12

## 2022-09-22 RX ORDER — DIAZEPAM 5 MG
5 TABLET ORAL EVERY 6 HOURS PRN
Qty: 1 TABLET | Refills: 0 | Status: SHIPPED | OUTPATIENT
Start: 2022-09-22 | End: 2022-09-22

## 2022-09-22 RX ADMIN — POLYETHYLENE GLYCOL 3350 17 G: 17 POWDER, FOR SOLUTION ORAL at 08:05

## 2022-09-22 RX ADMIN — OXYCODONE HYDROCHLORIDE 10 MG: 10 TABLET ORAL at 04:35

## 2022-09-22 RX ADMIN — LIDOCAINE PATCH 4% 2 PATCH: 40 PATCH TOPICAL at 08:02

## 2022-09-22 RX ADMIN — DEXAMETHASONE 6 MG: 2 TABLET ORAL at 06:45

## 2022-09-22 RX ADMIN — OXYCODONE HYDROCHLORIDE 10 MG: 10 TABLET ORAL at 09:47

## 2022-09-22 RX ADMIN — SENNOSIDES AND DOCUSATE SODIUM 1 TABLET: 50; 8.6 TABLET ORAL at 08:05

## 2022-09-22 RX ADMIN — ACETAMINOPHEN 975 MG: 325 TABLET, FILM COATED ORAL at 11:14

## 2022-09-22 RX ADMIN — OXYCODONE HYDROCHLORIDE 10 MG: 10 TABLET ORAL at 16:33

## 2022-09-22 RX ADMIN — PRENATAL VITAMINS-IRON FUMARATE 27 MG IRON-FOLIC ACID 0.8 MG TABLET 1 TABLET: at 08:04

## 2022-09-22 RX ADMIN — DIAZEPAM 5 MG: 5 TABLET ORAL at 02:16

## 2022-09-22 RX ADMIN — SERTRALINE 50 MG: 25 TABLET, FILM COATED ORAL at 08:04

## 2022-09-22 RX ADMIN — CYCLOBENZAPRINE 10 MG: 10 TABLET, FILM COATED ORAL at 09:47

## 2022-09-22 RX ADMIN — ACETAMINOPHEN 975 MG: 325 TABLET, FILM COATED ORAL at 02:16

## 2022-09-22 ASSESSMENT — ACTIVITIES OF DAILY LIVING (ADL)
ADLS_ACUITY_SCORE: 20

## 2022-09-22 NOTE — PLAN OF CARE
"/73 (BP Location: Right arm)   Pulse 93   Temp 97.9  F (36.6  C) (Oral)   Resp 16   Ht 1.651 m (5' 5\")   Wt 89.2 kg (196 lb 10.4 oz)   LMP 02/24/2022   SpO2 100%   BMI 32.72 kg/m    Pt vitals stable.Denies SOB, chest pain,dizziness , nausea/vomiting. Pain managed with oxycodone PRN with relief. Pt requested for PT referral and note for work. Both completed before pt left.  Pt. discharged at 1730 to home, and left with personal belongings. Pt. received complete discharge paperwork and all medications as filled by discharge pharmacy. Pt received and signed for the narcotic medication oxycodone. Pt. was given times of last dose for all discharge medications in writing on discharge medication sheets. Discharge teaching included all medication, pain management, activity restrictions, dressing changes, and signs and symptoms of infection. Pt. had no further questions at the time of discharge.    "

## 2022-09-22 NOTE — PLAN OF CARE
"BP 90/51 (BP Location: Left arm)   Pulse 65   Temp 97.7  F (36.5  C) (Oral)   Resp 16   Ht 1.651 m (5' 5\")   Wt 89.2 kg (196 lb 10.4 oz)   LMP 02/24/2022   SpO2 99%   BMI 32.72 kg/m      Patient is A&O x4.   Independent   Patient report baseline numbness and tingling in R leg.   C/o pain in lower back and R hip radiating to R leg. Neuro surgery aware. Pain managed with PRN flexeril, PRN oxy, lido patch, and ice packs.   Residual bladder scan 430, 450, 275. Neuro aware and not concerned.    Plan on discharge today.     Continue with POC.     "

## 2022-09-22 NOTE — UTILIZATION REVIEW
Concurrent stay review; Secondary Review Determination     Under the authority of the Utilization Management Committee, the utilization review process indicated a secondary review on the above patient.  The review outcome is based on review of the medical records, discussions with staff, and applying clinical experience noted on the date of the review.          (x) Observation Status Appropriate - Concurrent stay review    RATIONALE FOR DETERMINATION   31 yo female admitted  For management of L4-5 disc protrusion resulting in spinal stenosis. Also is 29 weeks pregnant. Had laminectomy and microdiscectomy on 9/20/22. Pain controlled on oral analgesics. Discharging today 9/22/2022.    Patient is clinically improving and there is no clear indication to change patient's status to inpatient. The severity of illness, intensity of service provided, expected LOS and risk for adverse outcome make the care appropriate for observation.    This document was produced using voice recognition software     The information on this document is developed by the utilization review team in order for the business office to ensure compliance.  This only denotes the appropriateness of proper admission status and does not reflect the quality of care rendered.         The definitions of Inpatient Status and Observation Status used in making the determination above are those provided in the CMS Coverage Manual, Chapter 1 and Chapter 6, section 70.4.      Sincerely,   Maria M Escobar MD  Utilization Review  Physician Advisor  [facility].

## 2022-09-22 NOTE — PLAN OF CARE
Pt is A/O x4, independent. Pt rated pain 8/10 of lower back and right hip that radiates to right leg, managed with medication, patches, and ice packs. Pt reported numbness/tingling in right leg with no improvement with medication.   Bladder scan 439, 323, 82. Continue to monitor, notify provider if >500ml.  At 0420hrs, right PIV was infiltrated with LR. IV line was removed, elevated, and hot pack was applied.    Left PIV, saline lock.  Continue POC.

## 2022-09-22 NOTE — DISCHARGE SUMMARY
"[facility] Discharge Summary and Instructions    Indigo Lamb MRN# [id number]   Age: 32 year old YOB: 1990     Date of Admission:  2022  Date of Discharge::  2022  Admitting Physician:  Jenn Murray MD  Discharge Physician:  Jenn Murray MD          Admission Diagnoses:   Lumbar disc herniation [M51.26]  S/P lumbar microdiscectomy [Z98.890]          Discharge Diagnosis:     Lumbar disc herniation [M51.26]  S/P lumbar microdiscectomy [Z98.890]     Clinically Significant Risk Factors Present on Admission            # Obesity: Estimated body mass index is 32.72 kg/m  as calculated from the following:    Height as of this encounter: 1.651 m (5' 5\").    Weight as of this encounter: 89.2 kg (196 lb 10.4 oz).             Procedures:   s/p L4-5 microdiscectomy on 2022 with Dr. Jenn Murray.           Brief History of Illness:   Indigo Lamb is a 32 year old  female with no significant PMHx who presented to [place] ED for evaluation of acute right lower extremity radiating pain and numbness with MRI demonstrating L4-5 disc herniation, for which she was transferred to [facility] for Neurosurgical evaluation and care. Patient has elected to undergo above-mentioned procedure.           Hospital Course:   Patient underwent above-mentioned procedure on 2022. The operation was uncomplicated and she was admitted to the floor for routine post operative cares.  On post operative day 2, she was ambulating, voiding without a serrano, eating a regular diet, pain was well controlled and therefore she was discharged to home. Patient did have some post-void retention likely related to her current pregnancy and not caused from her spinal surgery.Patient will follow-up with Neurosurgery in 2 weeks.          Discharge Medications:     Current Discharge Medication List      START taking these medications    Details   acetaminophen (TYLENOL) 325 MG tablet Take 3 tablets (975 mg) by " mouth every 8 hours  Qty: 45 tablet, Refills: 0    Associated Diagnoses: Lumbar disc herniation      cyclobenzaprine (FLEXERIL) 10 MG tablet Take 1 tablet (10 mg) by mouth 3 times daily as needed for muscle spasms  Qty: 15 tablet, Refills: 0    Associated Diagnoses: Lumbar disc herniation      diazepam (VALIUM) 5 MG tablet Take 1 tablet (5 mg) by mouth every 6 hours as needed for muscle spasms  Qty: 1 tablet, Refills: 0    Associated Diagnoses: Lumbar disc herniation      Lidocaine (LIDOCARE) 4 % Patch Place 2 patches onto the skin every 24 hours To prevent lidocaine toxicity, patient should be patch free for 12 hrs daily.  Qty: 3 patch, Refills: 0    Associated Diagnoses: Lumbar disc herniation      ondansetron (ZOFRAN ODT) 4 MG ODT tab Take 1 tablet (4 mg) by mouth every 6 hours as needed for nausea or vomiting  Qty: 15 tablet, Refills: 0    Associated Diagnoses: Lumbar disc herniation      oxyCODONE (ROXICODONE) 5 MG tablet Take 1 tablet (5 mg) by mouth every 4 hours as needed  Qty: 15 tablet, Refills: 0    Associated Diagnoses: Lumbar disc herniation      polyethylene glycol (MIRALAX) 17 g packet Take 17 g by mouth daily  Qty: 20 packet, Refills: 0    Associated Diagnoses: Lumbar disc herniation      senna-docusate (SENOKOT-S/PERICOLACE) 8.6-50 MG tablet Take 1 tablet by mouth 2 times daily  Qty: 20 tablet, Refills: 0    Associated Diagnoses: Lumbar disc herniation         CONTINUE these medications which have NOT CHANGED    Details   clindamycin (CLINDAMAX) 1 % external gel Apply topically 2 times daily  Qty: 60 g, Refills: 2    Associated Diagnoses: Acne vulgaris      Prenatal Vit-Fe Fumarate-FA (PRENATAL PO) Take 1 tablet by mouth daily      sertraline (ZOLOFT) 50 MG tablet Take 1 tablet (50 mg) by mouth daily  Qty: 90 tablet, Refills: 3    Associated Diagnoses: Current mild episode of major depressive disorder without prior episode (H)                     Discharge Instructions and Follow-Up:     Discharge  diet: Regular   Discharge activity: You may advance activity as tolerated. No strenuous exercise or heay lifting greater than 10 lbs for 4 weeks or until seen and cleared in clinic.     Discharge follow-up: Follow-up with Neurosurgery [facility] in 2 weeks for wound check/post-hospital evaluation.    All additional follow-up visits to be determined by Dr. Jenn Murray MD       Wound care: Ok to shower,however no scrubbing of the wound and no soaking of the wound, meaning no bathtubs or swimming pools. Pat dry only. Leave wound open to air.  Patient to have wound checked 2 weeks following surgery.    Wound location: lumbar spine  Closure technique: 3-0 Monocryl   Dressing needs: open to air  Post-op care at follow-up: Keep dry and clean               Please call if you have:  1. increased pain, redness, drainage, swelling at your incision  2. fevers > 101.5 F degrees  3. with any questions or concerns.  You may reach the Neurosurgery clinic at [phone] during regular work hours. ER at [phone].    and ask for the Neurosurgery Resident on call at [phone], during off hours or weekends.         Discharge Disposition:     Discharged to home        ZUNILDA Oquendo, CNP  Neurosurgery  Pager [phone]

## 2022-09-22 NOTE — PROGRESS NOTES
M Antepartum Progress Note    Subjective:   Doing okay, still having numbness and tingling in her right leg with intermittent shooting pain. Pain overall is improving with Tylenol, oxycodone, and flexeril/valium. She has been able to ambulate and overall feels steady on her feet. Voiding has been an issue, having some incomplete emptying. Reports similar episodes in immediate postpartum period secondary to pain after vaginal delivery. Required straight cath and ultimately replacement of serrano catheter.     Objective:  Vitals:    22 1555 22 1600 22 0435 22 0812   BP: 96/46  95/59 90/51   BP Location: Left arm  Left arm Left arm   Patient Position:   Right side    Cuff Size:   Adult Regular    Pulse: 104  85 65   Resp: 16  16 16   Temp: 98.5  F (36.9  C)  97.8  F (36.6  C) 97.7  F (36.5  C)   TempSrc: Oral  Oral Oral   SpO2: 99% 99% 98% 99%   Weight:       Height:         I/O last 3 completed shifts:  In: 5 [I.V.:5]  Out: 850 [Urine:850]    Gen: Resting comfortably in bed, NAD  CV: Well perfused   Resp: Breathing comfortably on room air   Abd: Gravid    FHT: Baseline 140, moderate variability, accels present, no decels  [place]: quiet     Assessment/Plan:   Indigo Lamb is a 32 year old  at 30w0d by LMP consistent with 10w1d US admitted for further management of L4-L5 disc protrusion with resulting severe spinal stenosis, now POD#2 s/p hemilaminectomy and microdiscectomy. Pregnancy has otherwise been largely uncomplicated.      # L4-L5 disc herniation with severe spinal stenosis, s/p hemilaminectomy and microdiscectomy  - Neurosurgery primary; will continue following peripherally  - Pain improving, now having incomplete emptying - asked RN to page neurosurgery team for recommendations due to risk of cauda equina symptoms.   - Post-operative pain management in pregnancy:   - Continue scheduled acetaminophen 975 mg q8h   - Continue PO oxycodone 5-10 mg q4h prn   - Continue Dilaudid IV  0.2 mg q4h prn (not requiring)  - Continue Flexeril 10 mg tid prn  - Continue Valium as needed for pain  - Could consider adding gabapentin for ongoing pain if needed     # Fetal Well-Being   - s/p BMZ dose 1  at 0017, dexamethasone 6 mg q6hrs x4 on   - BID EFM while inpatient - reactive and reassuring   - Rh negative, s/p Rhogam   - No obstetric concerns    # Delivery planning  - Recommend anesthesiology consultation at around 34 weeks gestation to discuss anesthesia options/plan for the time of delivery (will be performed as an outpatient through her primary OB office)    Sarah Sutherland MD  OB/GYN Resident PGY-3  10:32 AM 2022      Physician Attestation   I saw this patient with the resident and agree with the resident/fellow's findings and plan of care as documented in the note.      I personally reviewed vital signs, medications, labs and imaging.    Russo findings: Indigo is feeling better today. She continues to have numbness and tingling in her right leg, and intermittent shooting pain. She notes that she is having difficulty completely emptying her bladder. She is sitting for awhile after voiding as she feels like she still has to go, but is unable to completely empty. She did not have this during her last pregnancy, only following vaginal delivery after experiencing perineal swelling and with neuraxial anesthesia. We discussed that her current urinary retention is not likely related to the pregnancy.     We discussed the medications she is taking during pregnancy. Recommend that she continue to take the narcotic, flexeril and valium as needed to control her acute post-surgical pain. Using these medications in the short term remote form delivery does not pose a risk for  abstinence syndrome; this would only be a concern if she has chronic on-going use of these medications.     We also discussed the recommendation for anesthesia consultation at around 34 weeks gestation to  discuss pain management options during labor and delivery.    Lashon Joel MD  Date of Service (when I saw the patient): 09/22/22

## 2022-09-23 ENCOUNTER — TELEPHONE (OUTPATIENT)
Dept: NEUROSURGERY | Facility: CLINIC | Age: 32
End: 2022-09-23

## 2022-09-23 ENCOUNTER — PATIENT OUTREACH (OUTPATIENT)
Dept: FAMILY MEDICINE | Facility: CLINIC | Age: 32
End: 2022-09-23

## 2022-09-23 NOTE — TELEPHONE ENCOUNTER
"  ED for acute condition Discharge Protocol    \"Hi, my name is Jaida Cain RN, a registered nurse, and I am calling from [facility].  I am calling to follow up and see how things are going for you after your recent emergency visit.\"    Tell me how you are doing now that you are home?\" lots of pain, icing, lidocaine, tylenol, tramadol, flexeril      Discharge Instructions    \"Let's review your discharge instructions.  What is/are the follow-up recommendations?  Pt. Response: follow up with neurosurgery, PT    \"Has an appointment with your primary care provider been scheduled?\"  No (not needed)    Medications    \"Tell me what changed about your medicines when you discharged?\"    Tramadol, oxy, lido patch, ice, flexeril    \"What questions do you have about your medications?\"   None        Call Summary    \"What questions or concerns do you have about your recent visit and your follow-up care?\"     none    \"If you have questions or things don't continue to improve, we encourage you contact us through the main clinic number (give number).  Even if the clinic is not open, triage nurses are available 24/7 to help you.     We would like you to know that our clinic has extended hours (provide information).  We also have urgent care (provide details on closest location and hours/contact info)\"    \"Thank you for your time and take care!\"                "

## 2022-09-23 NOTE — TELEPHONE ENCOUNTER
Writer left message for patient requesting a call back to the clinic.    First attempt.    Beni CONWAY [facility]

## 2022-09-23 NOTE — TELEPHONE ENCOUNTER
What type of discharge? Emergency Department  Risk of Hospital admission or ED visit: 8%  Is a TCM episode required? No  When should the patient follow up with PCP? within 30 days of discharge.    Beni Ardon RN  [facility]

## 2022-09-27 ENCOUNTER — TELEPHONE (OUTPATIENT)
Dept: NEUROSURGERY | Facility: CLINIC | Age: 32
End: 2022-09-27

## 2022-09-28 DIAGNOSIS — M51.26 LUMBAR DISC HERNIATION: ICD-10-CM

## 2022-09-28 RX ORDER — CYCLOBENZAPRINE HCL 10 MG
10 TABLET ORAL 3 TIMES DAILY PRN
Qty: 15 TABLET | Refills: 0 | Status: SHIPPED | OUTPATIENT
Start: 2022-09-28 | End: 2022-10-17

## 2022-09-28 RX ORDER — DIAZEPAM 5 MG
5 TABLET ORAL EVERY 8 HOURS PRN
Qty: 10 TABLET | Refills: 0
Start: 2022-09-28 | End: 2022-10-19

## 2022-10-03 NOTE — PROGRESS NOTES
Neurosurgery Clinic Note    Chief Complaint: 2 week post-op visit    DATE OF VISIT: 10/5/2022    Procedure Date: 09/20/2022     PREOPERATIVE DIAGNOSES:  Lumbar 4-5 disk herniation with radiculopathy.     POSTOPERATIVE DIAGNOSES:  Lumbar 4-5 disk herniation with radiculopathy.     PROCEDURES PERFORMED:    1.  Right hemilaminectomy with microdiscectomy at lumbar 4-5.  2.  Use of intraoperative microscope.     ATTENDING SURGEON:  Jenn Murray MD, PhD     RESIDENT SURGEON:  Cuba Maurer MD     ANESTHESIA:  General.     ESTIMATED BLOOD LOSS:  10 mL     INTRAOPERATIVE FINDINGS:  Large disk herniation was removed en bloc.  Epidural space felt well decompressed at the end of the case.     INDICATIONS FOR PROCEDURE:  Ms. Lamb is a 32-year-old female who is approximately 30 weeks pregnant, presented with progressively worsening lower back pain for the past 3 weeks.  She had an acute change in her symptoms with new right lower extremity radiating pain, numbness, tingling, and weakness in the foot.  MRI demonstrated a large acute disk herniation at the L4-L5 level.  Given the patient's weakness, she was offered the aforementioned procedure.  OB was involved in the procedure for monitoring of the fetus.  After thorough conversation about the risks and benefits of surgery, the patient elected to move forward with the offered surgical procedure.      HPI: Indigo Lamb is a pleasant 32 year old female who is s/p right L4-5 hemilaminectomy, microdiscectomy by Dr. Murray on 9/20/22. Per chart review, patient had pre-op symptoms of acute right leg pain with numbness.     Currently, she is about 2 weeks out from her surgery.  She is about 32 weeks pregnant.  Patient notes the severe back pain she had experienced prior to surgery has resolved.  She does note improvement of the radicular pain down her legs, but does get some occasional zingers.  She continues to have muscle spasms and tightness in her back and legs.  Her right  leg still feels numb, but this is improving as it is mainly now on the lateral aspect of her calf/shin and worse in her pinky toe vs big toe. She does have issues getting comfortable at night and finds it difficult to walk given her leg feels numb.  She denies issues with her incision and has been taking showers, but she would really like to take a bath.     She continues to take Tylenol 4 times per day, Flexeril at bedtime and Valium mostly in the morning.  She does think she will be done with the Flexeril and Valium by the time she is out of her current prescriptions. 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      She has concerns about delivering after having a spinal surgery and what that means for getting an epidural for either a vaginal or caesarian delivery.    Review of Systems   Negative except in HPI    Past Medical History:   Diagnosis Date     Post partum depression 2020       Past Surgical History:   Procedure Laterality Date     HC TOOTH EXTRACTION W/FORCEP      wisdom teeth     HEMILAMINECTOMY, DISCECTOMY LUMBAR ONE LEVEL, COMBINED Right 9/20/2022    Procedure: RIGHT HEMILAMINECTOMY,  WITH MICRODISCECTOMY L4-5;  Surgeon: Jenn Murray MD;  Location: [facility] OR     SURGICAL HISTORY OF -   2008    Bone-patellar-bone left knee anterior cruciate ligament reconstruction       Social History     Socioeconomic History     Marital status:    Tobacco Use     Smoking status: Never Smoker     Smokeless tobacco: Never Used     Tobacco comment: parents smoke   Vaping Use     Vaping Use: Never used   Substance and Sexual Activity     Alcohol use: Not Currently     Comment: rare-quit with pregnancy     Drug use: No     Sexual activity: Yes     Partners: Male     Birth control/protection: Condom     Comment: boyfriend since 2010   Other Topics Concern     Parent/sibling w/ CABG, MI or angioplasty before 65F 55M? No       family history includes Alcoholism in her  maternal grandfather; Allergies in her brother; Anxiety Disorder in her mother; Breast Cancer in her maternal grandmother; Heart Failure in her maternal grandfather; Hypertension in her maternal grandmother; Obesity in her father; Other - See Comments in her paternal grandmother.              Physical Exam   LMP 02/24/2022       Constitutional: Alert, no acute distress.  [place]:  ambulates independently, steady, waddle gait (32 weeks pregnant)    Neurological:    STRENGTH LEFT RIGHT       Hip Flexion     5     5   Knee Extension 5 5   Ankle Dorsiflexion 5 5   Extensor Hallucis Longus 5 3   Plantar Flexion 5 5   Foot eversion 5 5   Foot inversion 5 5     Sensation: decreased sensation on the lateral aspect of right calf/shin, top and lateral side of right foot and toes.    Reflexes Left/Right   Patellar 3+/3+   Ankle 2+/2+       Incision:  Healing well, mild midline scabbing throughout incision.  No erythema, induration or fluctuance.  No drainage noted.      IMAGING:  No new imaging.    MRI 9/19/22 - large disc herniation L4/5 level.        ASSESSMENT & PLAN :  Indigo Lamb is a 32 year old female who is s/p right L4-5 hemilaminectomy, microdiscectomy by Dr. Murray on 9/20/22. Per chart review, patient had pre-op symptoms of acute right leg pain with numbness.    Patient is healing well and notes resolution of her back pain.  She continues to have muscle spasms and intermittent shooters, numbness in her right leg.  The numbness is improved some as it is more localized to the outside of her right calf/shin and foot and not the entire leg and foot as it was prior to surgery.      She is doing well on her current pain regimen.  She will continue to wean off the narcotic medication.  Continue Tylenol, muscle relaxer, ice/heat for pain as needed.     Call if a refill on Flexeril is needed.  No driving while using narcotics or other sedating medications, such as sleep aids, muscle relaxants, etc.     Continue to  increase time ambulating, avoid bending, twisting, strenuous exercise, or heavy lifting (greater than 15 pounds). We will plan for her to remain off work until her next post-op appointment.    Okay to shower and get incision wet.  No submerging/soaking it until it is fully healed (around 4 weeks post-op).     Patient provided with reassurance and education today about the recovery process and expectations.  We discussed that she is early in her recovery process and that it is expected with the amount of compression she had that it will take more time to heal--with pain improving first, followed by weakness, then last numbness.  She should continue to monitor her symptoms and call us if things worsen, but that the numbness, spasms and intermittent shooters is not unexpected at this stage.      We do anticipate that she will be able to proceed with a normal vaginal delivery if that is how she would like to proceed.  She should talk to her anesthesiologist regarding pain control for her delivery and concerns about an epidural.      Patient has scheduled a nonspine PT appointment, which would be okay to keep.  We discussed that we normally would start PT, if needed, after her next appointment.  However, if she would like to meet with a therapist to go over biomechanics in anticipation of labor, that could be arranged. Patient will let us know if she needs anything prior to her next appointment.    Follow up in 4 weeks, no imaging needed at this appointment.    Patient is in agreement with this plan and states no further questions.      Marilyn Delacruz PA-C  Department of Neurosurgery  Office: [phone]

## 2022-10-05 ENCOUNTER — OFFICE VISIT (OUTPATIENT)
Dept: NEUROSURGERY | Facility: CLINIC | Age: 32
End: 2022-10-05
Payer: COMMERCIAL

## 2022-10-05 VITALS
RESPIRATION RATE: 16 BRPM | WEIGHT: 200 LBS | HEART RATE: 84 BPM | BODY MASS INDEX: 33.28 KG/M2 | OXYGEN SATURATION: 97 % | DIASTOLIC BLOOD PRESSURE: 72 MMHG | SYSTOLIC BLOOD PRESSURE: 107 MMHG

## 2022-10-05 DIAGNOSIS — Z98.890 S/P LUMBAR LAMINECTOMY: Primary | ICD-10-CM

## 2022-10-05 PROCEDURE — 99024 POSTOP FOLLOW-UP VISIT: CPT | Performed by: PHYSICIAN ASSISTANT

## 2022-10-05 ASSESSMENT — PAIN SCALES - GENERAL: PAINLEVEL: MODERATE PAIN (4)

## 2022-10-05 NOTE — PATIENT INSTRUCTIONS
Continue current restrictions.  No submerging incision in water until it is fully healed.  Call if you need a work note or a refill on Flexeril.  Follow up in 4 weeks.

## 2022-10-05 NOTE — LETTER
10/5/2022       RE: Indigo Lamb  [id number] [facility] [address]     Dear Colleague,    Thank you for referring your patient, Indigo Lamb, to the [facility] [place] at [facility]. Please see a copy of my visit note below.        Neurosurgery Clinic Note    Chief Complaint: 2 week post-op visit    DATE OF VISIT: 10/5/2022    Procedure Date: 09/20/2022     PREOPERATIVE DIAGNOSES:  Lumbar 4-5 disk herniation with radiculopathy.     POSTOPERATIVE DIAGNOSES:  Lumbar 4-5 disk herniation with radiculopathy.     PROCEDURES PERFORMED:    1.  Right hemilaminectomy with microdiscectomy at lumbar 4-5.  2.  Use of intraoperative microscope.     ATTENDING SURGEON:  Jenn Murray MD, PhD     RESIDENT SURGEON:  Cuba Maurer MD     ANESTHESIA:  General.     ESTIMATED BLOOD LOSS:  10 mL     INTRAOPERATIVE FINDINGS:  Large disk herniation was removed en bloc.  Epidural space felt well decompressed at the end of the case.     INDICATIONS FOR PROCEDURE:  Ms. Lamb is a 32-year-old female who is approximately 30 weeks pregnant, presented with progressively worsening lower back pain for the past 3 weeks.  She had an acute change in her symptoms with new right lower extremity radiating pain, numbness, tingling, and weakness in the foot.  MRI demonstrated a large acute disk herniation at the L4-L5 level.  Given the patient's weakness, she was offered the aforementioned procedure.  OB was involved in the procedure for monitoring of the fetus.  After thorough conversation about the risks and benefits of surgery, the patient elected to move forward with the offered surgical procedure.      HPI: Indigo Lamb is a pleasant 32 year old female who is s/p right L4-5 hemilaminectomy, microdiscectomy by Dr. Murray on 9/20/22. Per chart review, patient had pre-op symptoms of acute right leg pain with numbness.     Currently, she is about 2 weeks out  from her surgery.  She is about 32 weeks pregnant.  Patient notes the severe back pain she had experienced prior to surgery has resolved.  She does note improvement of the radicular pain down her legs, but does get some occasional zingers.  She continues to have muscle spasms and tightness in her back and legs.  Her right leg still feels numb, but this is improving as it is mainly now on the lateral aspect of her calf/shin and worse in her pinky toe vs big toe. She does have issues getting comfortable at night and finds it difficult to walk given her leg feels numb.  She denies issues with her incision and has been taking showers, but she would really like to take a bath.     She continues to take Tylenol 4 times per day, Flexeril at bedtime and Valium mostly in the morning.  She does think she will be done with the Flexeril and Valium by the time she is out of her current prescriptions. 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      She has concerns about delivering after having a spinal surgery and what that means for getting an epidural for either a vaginal or caesarian delivery.    Review of Systems   Negative except in HPI    Past Medical History:   Diagnosis Date     Post partum depression 2020       Past Surgical History:   Procedure Laterality Date     HC TOOTH EXTRACTION W/FORCEP      wisdom teeth     HEMILAMINECTOMY, DISCECTOMY LUMBAR ONE LEVEL, COMBINED Right 9/20/2022    Procedure: RIGHT HEMILAMINECTOMY,  WITH MICRODISCECTOMY L4-5;  Surgeon: Jenn Murray MD;  Location: [facility] OR     SURGICAL HISTORY OF -   2008    Bone-patellar-bone left knee anterior cruciate ligament reconstruction       Social History     Socioeconomic History     Marital status:    Tobacco Use     Smoking status: Never Smoker     Smokeless tobacco: Never Used     Tobacco comment: parents smoke   Vaping Use     Vaping Use: Never used   Substance and Sexual Activity     Alcohol  use: Not Currently     Comment: rare-quit with pregnancy     Drug use: No     Sexual activity: Yes     Partners: Male     Birth control/protection: Condom     Comment: boyfriend since 2010   Other Topics Concern     Parent/sibling w/ CABG, MI or angioplasty before 65F 55M? No       family history includes Alcoholism in her maternal grandfather; Allergies in her brother; Anxiety Disorder in her mother; Breast Cancer in her maternal grandmother; Heart Failure in her maternal grandfather; Hypertension in her maternal grandmother; Obesity in her father; Other - See Comments in her paternal grandmother.              Physical Exam   LMP 02/24/2022       Constitutional: Alert, no acute distress.  [place]amberlain:  ambulates independently, steady, waddle gait (32 weeks pregnant)    Neurological:    STRENGTH LEFT RIGHT       Hip Flexion     5     5   Knee Extension 5 5   Ankle Dorsiflexion 5 5   Extensor Hallucis Longus 5 3   Plantar Flexion 5 5   Foot eversion 5 5   Foot inversion 5 5     Sensation: decreased sensation on the lateral aspect of right calf/shin, top and lateral side of right foot and toes.    Reflexes Left/Right   Patellar 3+/3+   Ankle 2+/2+       Incision:  Healing well, mild midline scabbing throughout incision.  No erythema, induration or fluctuance.  No drainage noted.      IMAGING:  No new imaging.    MRI 9/19/22 - large disc herniation L4/5 level.        ASSESSMENT & PLAN :  Indigo Lamb is a 32 year old female who is s/p right L4-5 hemilaminectomy, microdiscectomy by Dr. Murray on 9/20/22. Per chart review, patient had pre-op symptoms of acute right leg pain with numbness.    Patient is healing well and notes resolution of her back pain.  She continues to have muscle spasms and intermittent shooters, numbness in her right leg.  The numbness is improved some as it is more localized to the outside of her right calf/shin and foot and not the entire leg and foot as it was prior to surgery.      She is doing well  on her current pain regimen.  She will continue to wean off the narcotic medication.  Continue Tylenol, muscle relaxer, ice/heat for pain as needed.     Call if a refill on Flexeril is needed.  No driving while using narcotics or other sedating medications, such as sleep aids, muscle relaxants, etc.     Continue to increase time ambulating, avoid bending, twisting, strenuous exercise, or heavy lifting (greater than 15 pounds). We will plan for her to remain off work until her next post-op appointment.    Okay to shower and get incision wet.  No submerging/soaking it until it is fully healed (around 4 weeks post-op).     Patient provided with reassurance and education today about the recovery process and expectations.  We discussed that she is early in her recovery process and that it is expected with the amount of compression she had that it will take more time to heal--with pain improving first, followed by weakness, then last numbness.  She should continue to monitor her symptoms and call us if things worsen, but that the numbness, spasms and intermittent shooters is not unexpected at this stage.      We do anticipate that she will be able to proceed with a normal vaginal delivery if that is how she would like to proceed.  She should talk to her anesthesiologist regarding pain control for her delivery and concerns about an epidural.      Patient has scheduled a nonspine PT appointment, which would be okay to keep.  We discussed that we normally would start PT, if needed, after her next appointment.  However, if she would like to meet with a therapist to go over biomechanics in anticipation of labor, that could be arranged. Patient will let us know if she needs anything prior to her next appointment.    Follow up in 4 weeks, no imaging needed at this appointment.    Patient is in agreement with this plan and states no further questions.              Again, thank you for allowing me to participate in the care of  your patient.      Sincerely,    RAE PA-C

## 2022-10-06 ENCOUNTER — TELEPHONE (OUTPATIENT)
Dept: NEUROSURGERY | Facility: CLINIC | Age: 32
End: 2022-10-06

## 2022-10-06 ENCOUNTER — PRENATAL OFFICE VISIT (OUTPATIENT)
Dept: OBGYN | Facility: CLINIC | Age: 32
End: 2022-10-06
Payer: COMMERCIAL

## 2022-10-06 VITALS
BODY MASS INDEX: 33.62 KG/M2 | SYSTOLIC BLOOD PRESSURE: 109 MMHG | HEART RATE: 106 BPM | HEIGHT: 65 IN | DIASTOLIC BLOOD PRESSURE: 57 MMHG | WEIGHT: 201.8 LBS | TEMPERATURE: 97.3 F | RESPIRATION RATE: 16 BRPM

## 2022-10-06 DIAGNOSIS — Z34.83 ENCOUNTER FOR SUPERVISION OF OTHER NORMAL PREGNANCY IN THIRD TRIMESTER: Primary | ICD-10-CM

## 2022-10-06 PROCEDURE — 99207 PR PRENATAL VISIT: CPT | Performed by: OBSTETRICS & GYNECOLOGY

## 2022-10-06 RX ORDER — BREAST PUMP
1 EACH MISCELLANEOUS PRN
Qty: 1 EACH | Refills: 0 | Status: SHIPPED | OUTPATIENT
Start: 2022-10-06 | End: 2023-10-12

## 2022-10-06 NOTE — PROGRESS NOTES
Concerns: S/p Hemilaminectomy@ [facility]  Doing well.  No concerns today.  No vaginal bleeding, LOF.  No contractions.  Reportable signs and symptoms discussed.  Discussed kick counts and fetal movement.  Discussed PTL, PROM, and when to call or come in.  Discussed the route of delivery and her anesthesia options   Will review the delivery /Labor options with MFM and anesthesia options here @ [facility] 2 weeks.    Nasim Rodriguez MD

## 2022-10-12 DIAGNOSIS — M51.26 LUMBAR DISC HERNIATION: Primary | ICD-10-CM

## 2022-10-13 NOTE — TELEPHONE ENCOUNTER
PAPERWORK DOCUMENTATION    Paperwork to be completed within 5-7 days AFTER it has been received.     Paperwork should be sent to [facility] via My Chart, Right Fax or In Person.  Snail mail is difficult, Email is not approved by [facility]    How Paperwork is received:  Other    Type of Paperwork: Other     Who is the paperwork for:  Patient    Received Date October 06, 2022    Who Received the paperwork:  Bertha    Form to be Completed by:  Bertha    Anticipated Completion Date: October 13th-17th    Date Completed Form Faxed to Requested Entity: Writer faxed [employer] Form to [employer] @ [phone] on 10/17/2022.  Form scanned into patient's EMR.    RUTH Garcia LPN

## 2022-10-17 ENCOUNTER — HOSPITAL ENCOUNTER (OUTPATIENT)
Dept: PHYSICAL THERAPY | Facility: CLINIC | Age: 32
Setting detail: THERAPIES SERIES
Discharge: HOME | End: 2022-10-17
Payer: COMMERCIAL

## 2022-10-17 DIAGNOSIS — Z98.890 STATUS POST LUMBAR SURGERY: Primary | ICD-10-CM

## 2022-10-17 DIAGNOSIS — M51.26 LUMBAR DISC HERNIATION: ICD-10-CM

## 2022-10-17 PROCEDURE — 97161 PT EVAL LOW COMPLEX 20 MIN: CPT | Mod: GP

## 2022-10-17 PROCEDURE — 97110 THERAPEUTIC EXERCISES: CPT | Mod: GP

## 2022-10-17 PROCEDURE — 97535 SELF CARE MNGMENT TRAINING: CPT | Mod: GP

## 2022-10-17 PROCEDURE — 99207 PR NO BILLABLE SERVICE THIS VISIT: CPT | Performed by: PHYSICIAN ASSISTANT

## 2022-10-17 RX ORDER — CYCLOBENZAPRINE HCL 10 MG
10 TABLET ORAL 3 TIMES DAILY PRN
Qty: 30 TABLET | Refills: 1 | Status: SHIPPED | OUTPATIENT
Start: 2022-10-17 | End: 2023-01-12

## 2022-10-17 NOTE — TELEPHONE ENCOUNTER
Patient requested refill of Flexeril. Called patient to confirm pharmacy.    RAE PA-C on 10/17/2022 at 5:00 PM

## 2022-10-19 ENCOUNTER — PRENATAL OFFICE VISIT (OUTPATIENT)
Dept: OBGYN | Facility: CLINIC | Age: 32
End: 2022-10-19
Payer: COMMERCIAL

## 2022-10-19 VITALS
TEMPERATURE: 97 F | RESPIRATION RATE: 16 BRPM | DIASTOLIC BLOOD PRESSURE: 65 MMHG | WEIGHT: 204.2 LBS | SYSTOLIC BLOOD PRESSURE: 114 MMHG | BODY MASS INDEX: 34.02 KG/M2 | HEIGHT: 65 IN | HEART RATE: 93 BPM

## 2022-10-19 DIAGNOSIS — Z34.83 ENCOUNTER FOR SUPERVISION OF OTHER NORMAL PREGNANCY IN THIRD TRIMESTER: Primary | ICD-10-CM

## 2022-10-19 PROCEDURE — 99207 PR PRENATAL VISIT: CPT | Performed by: OBSTETRICS & GYNECOLOGY

## 2022-10-19 NOTE — PROGRESS NOTES
Concerns: discussion held with Anesthesia Dept here at [facility] and all are comfortable with providing her Obstetric analgesia services here @ [facility].  Doing well.  No concerns today. She still has some residual numbness in the right leg  No vaginal bleeding, LOF.  No contractions.  Reportable signs and symptoms discussed.  Discussed kick counts and fetal movement.  Discussed PTL, PROM, and when to call or come in.  RTC 2 weeks.    MFM appointment next week    Nasim Rodriguez MD

## 2022-10-20 ENCOUNTER — PRE VISIT (OUTPATIENT)
Dept: MATERNAL FETAL MEDICINE | Facility: HOSPITAL | Age: 32
End: 2022-10-20

## 2022-10-26 ENCOUNTER — OFFICE VISIT (OUTPATIENT)
Dept: MATERNAL FETAL MEDICINE | Facility: HOSPITAL | Age: 32
End: 2022-10-26
Attending: OBSTETRICS & GYNECOLOGY
Payer: COMMERCIAL

## 2022-10-26 DIAGNOSIS — M51.26 LUMBAR DISC HERNIATION: Primary | ICD-10-CM

## 2022-10-26 DIAGNOSIS — M51.26 LUMBAR DISC HERNIATION: ICD-10-CM

## 2022-10-26 PROCEDURE — 99214 OFFICE O/P EST MOD 30 MIN: CPT | Performed by: OBSTETRICS & GYNECOLOGY

## 2022-10-26 NOTE — PROGRESS NOTES
Writer placed order for anesthesia consult per Dr. Joel's request as pt had laminectomy in pregnancy. Pt was given phone number to call to schedule either virtual or in person. Amy Greenwood RN

## 2022-10-27 NOTE — PROGRESS NOTES
[facility] Follow up visit    S: Indigo presents today for delivery planning. She is s/p a right L4-5 hemilaminectomy, microdiscectomy at 29w 5d for L4-L5 disc protrusion with resulting severe spinal stenosis. She has been doing well since her surgery. Indigo notes continued numbness of the lateral aspect of her calf and her foot. She is having intermittent muscle cramping. However, she notes that her symptoms have continued to improve since her surgery. Indigo is currently using only flexeril 5 mg at night for control of her symptoms. She had tried to discontinue this recently, but was unable to sleep due to lower extremity cramping and discomfort. She has started physical therapy and has her six week post-op appointment with Neurosurgery scheduled next week.    Indigo shared today that she has concerns regarding the route of delivery, as well as neuraxial anesthesia. Given her current symptoms, she is unsure if she will be able to deliver vaginally. She is concerned that she will have an increase in her symptoms with valsalva during delivery, and may not tolerate vaginal delivery. She also has questions regarding anesthesia during labor and delivery and would like to consult with anesthesia directly. She is also unsure where she desires delivery. Indigo is currently planning delivery at [facility], but is also considering delivery at a tertiary care facility where sub-specialty care is available (OB anesthesia and neurosurgery).     O:  Gen: NAD  Abd: Gravid    Assessment:  32 year old  at 34w 6d who is s/p right L4-5 hemilaminectomy, microdiscectomy by Dr. Murray on 22. Significant improvement in neurologic symptoms, but with residual right lower extremity numbness.     We discussed that delivery recommendations/restrictions were reviewed with Neurosurgery at the time of her initial surgery, and they felt that there would not be a contraindication for a vaginal delivery. This was again confirmed at her two  week post-operative appointment. Given this, the route of delivery should be based on the usual obstetric indications. We discussed the possibility of using an assisted second stage to shorten the second stage of labor if she is unable to effectively valsalva due to discomfort. We also discussed that  section can be considered, although this would be considered an elective  section. We discussed the risks associated with  delivery, including increased risk for infection, bleeding, thromboembolism, and risk to future pregnancies.    We also discussed pain control during labor and delivery. While neuraxial anesthesia (spinal and/or epidural) can be performed, there is an increased risk for an incomplete block due to scar tissue secondary to her surgery limiting diffusion of the anesthetic. If neuraxial anesthesia is not effective, then she would require general anesthesia if  section is performed. We discussed the recommendation for formal anesthesia consultation given her recent back surgery, as well as the residual neurologic deficits. Indigo does desire a consultation with OB anesthesia at [facility].    Recommendations:  -Ultrasound to evaluate fetal growth in your office at around 36 weeks gestation given history of COVID-19 infection in pregnancy and to obtain EFW for delivery planning  -OB Anesthesia consultation (referral sent to [facility] today at Indigo's request)  -Follow up with Neurosurgery next week    Indigo will await her follow up visit with Neurosurgery, consultation with anesthesiology, estimation of fetal weight and her symptoms as the pregnancy progresses to definitively decide on the route of delivery and location of delivery. I provided her my contact information and asked her to reach out to me following her consultations if she has any further questions.    Lashon Joel MD  Specialist in Maternal-Fetal Medicine    The patient had all her questions  answered. She voiced understanding of the plan of care. Thank you for the opportunity to participate in the care of Ms. Lamb. Please do not hesitate to contact us if you may have any questions or concerns.       I spent 5 minutes prior to the visit preparing to see the patient (reviewing medical records and tests).  I spent 25 minutes face-face-to-face with the patient during the visit with the majority (>50%) spent on counseling and coordination of care with the patient and/or family members.  I spent 8 minutes after the visit with the patient documenting the visit in the electronic health record and/or communicating with other health care professionals and/or care coordination.      Total time spent on today's date of service: 33 minutes.

## 2022-10-29 DIAGNOSIS — O98.512 COVID-19 AFFECTING PREGNANCY IN SECOND TRIMESTER: Primary | ICD-10-CM

## 2022-10-29 DIAGNOSIS — U07.1 COVID-19 AFFECTING PREGNANCY IN SECOND TRIMESTER: Primary | ICD-10-CM

## 2022-10-31 NOTE — PROGRESS NOTES
Neurosurgery Clinic Note    Chief Complaint: 6 week post-op visit    DATE OF VISIT: 11/2/2022      Procedure Date: 09/20/2022     PREOPERATIVE DIAGNOSES:  Lumbar 4-5 disk herniation with radiculopathy.     POSTOPERATIVE DIAGNOSES:  Lumbar 4-5 disk herniation with radiculopathy.     PROCEDURES PERFORMED:    1.  Right hemilaminectomy with microdiscectomy at lumbar 4-5.  2.  Use of intraoperative microscope.     ATTENDING SURGEON:  Jenn Murray MD, PhD     RESIDENT SURGEON:  Cuba Maurer MD     ANESTHESIA:  General.     ESTIMATED BLOOD LOSS:  10 mL     INTRAOPERATIVE FINDINGS:  Large disk herniation was removed en bloc.  Epidural space felt well decompressed at the end of the case.     INDICATIONS FOR PROCEDURE:  Ms. Lamb is a 32-year-old female who is approximately 30 weeks pregnant, presented with progressively worsening lower back pain for the past 3 weeks.  She had an acute change in her symptoms with new right lower extremity radiating pain, numbness, tingling, and weakness in the foot.  MRI demonstrated a large acute disk herniation at the L4-L5 level.  Given the patient's weakness, she was offered the aforementioned procedure.  OB was involved in the procedure for monitoring of the fetus.  After thorough conversation about the risks and benefits of surgery, the patient elected to move forward with the offered surgical procedure.        HPI: Indigo Lamb is a pleasant 32 year old female who is s/p right L4-5 hemilaminectomy, microdiscectomy by Dr. Murray on 9/20/22. Per chart review, patient had pre-op symptoms of acute right leg pain with numbness.      She was last seen on 10/5/22, about 2 weeks out from her surgery and 32 weeks pregnant.  She noted resolution of the severe pre-operative back pain, she occasionally notes zingers in her legs.  The muscle spasms and tightness in her back and legs was persistent.  RLE numbness improved, but now on the lateral aspect of her calf/shin and worse in her pinky  toe vs big toe. Continued to have getting comfortable at night and walk because of leg numbness.  She denies issues with her incision and has been taking showers, but she would really like to take a bath.  Taking flexeril, Tylenol and valium.  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  Concerned about getting an epidural for either a vaginal or caesarian delivery in the setting of recent spinal surgery.    Currently patient notes continued numbness on the lateral aspect of her right calf and foot.  She also has difficulty with plantar flexion of her foot.  These deficits make it difficult for her to walk.  She is currently in PT and has exercises to work on strengthening.  Her muscle cramps and leg pain have subsided.  She is no longer taking valium and is only taking 1/2 of a flexeril at night.  She was able to take a bath last week.  She will be scheduling an appointment with anesthesiology to discuss her delivery in the near future.     Review of Systems  Negative except in HPI    Past Medical History:   Diagnosis Date     Post partum depression 2020       Past Surgical History:   Procedure Laterality Date     HC TOOTH EXTRACTION W/FORCEP      wisdom teeth     HEMILAMINECTOMY, DISCECTOMY LUMBAR ONE LEVEL, COMBINED Right 9/20/2022    Procedure: RIGHT HEMILAMINECTOMY,  WITH MICRODISCECTOMY L4-5;  Surgeon: Jenn Murray MD;  Location: [facility] OR     SURGICAL HISTORY OF -   2008    Bone-patellar-bone left knee anterior cruciate ligament reconstruction       Social History     Socioeconomic History     Marital status:    Tobacco Use     Smoking status: Never     Smokeless tobacco: Never     Tobacco comments:     parents smoke   Vaping Use     Vaping Use: Never used   Substance and Sexual Activity     Alcohol use: Not Currently     Comment: rare-quit with pregnancy     Drug use: No     Sexual activity: Yes     Partners: Male     Birth control/protection: Condom      Comment: boyfriend since 2010   Other Topics Concern     Parent/sibling w/ CABG, MI or angioplasty before 65F 55M? No       family history includes Alcoholism in her maternal grandfather; Allergies in her brother; Anxiety Disorder in her mother; Breast Cancer in her maternal grandmother; Heart Failure in her maternal grandfather; Hypertension in her maternal grandmother; Obesity in her father; Other - See Comments in her paternal grandmother.       Physical Exam   LMP 02/24/2022        Constitutional: Alert, no acute distress.  Independence:  ambulates independently    Neurological:     STRENGTH LEFT RIGHT         Hip Flexion       5       5   Knee Extension 5 5   Ankle Dorsiflexion 5 5   Extensor Hallucis Longus 5 3   Plantar Flexion 5 3      Sensation: decreased sensation on the lateral aspect of right calf/shin, top and lateral side of right foot and toes.     Reflexes Left/Right   Patellar 2+/2+   Ankle 2+/2+     Unable to perform right single leg calf raise.        Incision:  healing well.  No concerns.      IMAGING:  No new imaging.      ASSESSMENT:  Indigo Lamb is a 32 year old female who is s/p right L4-5 hemilaminectomy, microdiscectomy by Dr. Murray on 9/20/22. Per chart review, patient had pre-op symptoms of acute right leg pain with numbness.    Patient is now approximately 6 weeks out from surgery and is about 36 weeks pregnant.  She still endorses the numbness in her right calf and foot, but has no return of the muscle spasms or pain in her leg.  She also has weakness with plantar flexion of her right foot and is currently working with PT on this.  We discussed that these symptoms may take up to a year to recover in some cases.      I do think it is okay for her to gradually return to light duty work at this time.  Given she is still having weakness in her foot and numbness, I do not want her to work her normal 12 hour shift or perform the strenuous activities that she normally performs to give her  adequate time to heal.      PLAN:    Continue to use Tylenol, Flexeril, ice/heat for pain.      Okay to increase lifting gradually from the previous 15 pound limit, but nothing greater than 20-25 pounds at this time.  Avoid excessive bending/twisting activities.  Continue with PT.         Follow up in 6 weeks with Dr. Murray to see how you are doing.    RTW note provided.      Patient is in agreement with this plan and states no further questions.        Marilyn Delacruz PA-C  Department of Neurosurgery  Office: [phone]

## 2022-11-01 ENCOUNTER — HOSPITAL ENCOUNTER (OUTPATIENT)
Dept: PHYSICAL THERAPY | Facility: CLINIC | Age: 32
Setting detail: THERAPIES SERIES
Discharge: HOME | End: 2022-11-01
Payer: COMMERCIAL

## 2022-11-01 PROCEDURE — 97110 THERAPEUTIC EXERCISES: CPT | Mod: GP

## 2022-11-02 ENCOUNTER — OFFICE VISIT (OUTPATIENT)
Dept: NEUROSURGERY | Facility: CLINIC | Age: 32
End: 2022-11-02
Payer: COMMERCIAL

## 2022-11-02 VITALS
HEART RATE: 92 BPM | SYSTOLIC BLOOD PRESSURE: 116 MMHG | RESPIRATION RATE: 16 BRPM | BODY MASS INDEX: 33.95 KG/M2 | DIASTOLIC BLOOD PRESSURE: 77 MMHG | OXYGEN SATURATION: 98 % | WEIGHT: 204 LBS

## 2022-11-02 DIAGNOSIS — M51.26 LUMBAR DISC HERNIATION: Primary | ICD-10-CM

## 2022-11-02 DIAGNOSIS — Z98.890 STATUS POST LUMBAR SURGERY: ICD-10-CM

## 2022-11-02 PROCEDURE — 99024 POSTOP FOLLOW-UP VISIT: CPT | Performed by: PHYSICIAN ASSISTANT

## 2022-11-02 ASSESSMENT — PAIN SCALES - GENERAL: PAINLEVEL: MILD PAIN (3)

## 2022-11-02 NOTE — LETTER
November 2, 2022    RE:  Indigo Lamb                              [id number] [facility] [id number]      To whom it may concern:    Indigo Lamb is under my professional care for lumbar disc herniation and surgery.      She is cleared to return to light duty work up to 4 hours per day for three days per week for two weeks, then 4 hours per day for five days per week for two weeks, then return to work full time. Please allow patient take frequent rest breaks and have frequent position changes as needed for her healing.  She is not to lift/push/pull more than 20 pounds and should avoid excessive bending/twisting activities.    We will revise her work status at her next follow up appointment in 6 weeks.      Sincerely,        Marilyn Delacruz PA-C

## 2022-11-02 NOTE — LETTER
11/2/2022       RE: Indigo Lamb  [address] [facility] [address]     Dear Colleague,    Thank you for referring your patient, Indigo Lamb, to the [facility] NEUROSURGERY CLINIC [place] at [facility]. Please see a copy of my visit note below.        Neurosurgery Clinic Note    Chief Complaint: 6 week post-op visit    DATE OF VISIT: 11/2/2022      Procedure Date: 09/20/2022     PREOPERATIVE DIAGNOSES:  Lumbar 4-5 disk herniation with radiculopathy.     POSTOPERATIVE DIAGNOSES:  Lumbar 4-5 disk herniation with radiculopathy.     PROCEDURES PERFORMED:    1.  Right hemilaminectomy with microdiscectomy at lumbar 4-5.  2.  Use of intraoperative microscope.     ATTENDING SURGEON:  Jenn Murray MD, PhD     RESIDENT SURGEON:  Cuba Maurer MD     ANESTHESIA:  General.     ESTIMATED BLOOD LOSS:  10 mL     INTRAOPERATIVE FINDINGS:  Large disk herniation was removed en bloc.  Epidural space felt well decompressed at the end of the case.     INDICATIONS FOR PROCEDURE:  Ms. Lamb is a 32-year-old female who is approximately 30 weeks pregnant, presented with progressively worsening lower back pain for the past 3 weeks.  She had an acute change in her symptoms with new right lower extremity radiating pain, numbness, tingling, and weakness in the foot.  MRI demonstrated a large acute disk herniation at the L4-L5 level.  Given the patient's weakness, she was offered the aforementioned procedure.  OB was involved in the procedure for monitoring of the fetus.  After thorough conversation about the risks and benefits of surgery, the patient elected to move forward with the offered surgical procedure.        HPI: Indigo Lamb is a pleasant 32 year old female who is s/p right L4-5 hemilaminectomy, microdiscectomy by Dr. Murray on 9/20/22. Per chart review, patient had pre-op symptoms of acute right leg pain with numbness.      She was last seen on 10/5/22,  about 2 weeks out from her surgery and 32 weeks pregnant.  She noted resolution of the severe pre-operative back pain, she occasionally notes zingers in her legs.  The muscle spasms and tightness in her back and legs was persistent.  RLE numbness improved, but now on the lateral aspect of her calf/shin and worse in her pinky toe vs big toe. Continued to have getting comfortable at night and walk because of leg numbness.  She denies issues with her incision and has been taking showers, but she would really like to take a bath.  Taking flexeril, Tylenol and valium.  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  Concerned about getting an epidural for either a vaginal or caesarian delivery in the setting of recent spinal surgery.    Currently patient notes continued numbness on the lateral aspect of her right calf and foot.  She also has difficulty with plantar flexion of her foot.  These deficits make it difficult for her to walk.  She is currently in PT and has exercises to work on strengthening.  Her muscle cramps and leg pain have subsided.  She is no longer taking valium and is only taking 1/2 of a flexeril at night.  She was able to take a bath last week.  She will be scheduling an appointment with anesthesiology to discuss her delivery in the near future.     Review of Systems  Negative except in HPI    Past Medical History:   Diagnosis Date     Post partum depression 2020       Past Surgical History:   Procedure Laterality Date     HC TOOTH EXTRACTION W/FORCEP      wisdom teeth     HEMILAMINECTOMY, DISCECTOMY LUMBAR ONE LEVEL, COMBINED Right 9/20/2022    Procedure: RIGHT HEMILAMINECTOMY,  WITH MICRODISCECTOMY L4-5;  Surgeon: Jenn Murray MD;  Location: [facility] OR     SURGICAL HISTORY OF -   2008    Bone-patellar-bone left knee anterior cruciate ligament reconstruction       Social History     Socioeconomic History     Marital status:    Tobacco Use     Smoking  status: Never     Smokeless tobacco: Never     Tobacco comments:     parents smoke   Vaping Use     Vaping Use: Never used   Substance and Sexual Activity     Alcohol use: Not Currently     Comment: rare-quit with pregnancy     Drug use: No     Sexual activity: Yes     Partners: Male     Birth control/protection: Condom     Comment: boyfriend since 2010   Other Topics Concern     Parent/sibling w/ CABG, MI or angioplasty before 65F 55M? No       family history includes Alcoholism in her maternal grandfather; Allergies in her brother; Anxiety Disorder in her mother; Breast Cancer in her maternal grandmother; Heart Failure in her maternal grandfather; Hypertension in her maternal grandmother; Obesity in her father; Other - See Comments in her paternal grandmother.       Physical Exam   LMP 02/24/2022        Constitutional: Alert, no acute distress.  [place]:  ambulates independently    Neurological:     STRENGTH LEFT RIGHT         Hip Flexion       5       5   Knee Extension 5 5   Ankle Dorsiflexion 5 5   Extensor Hallucis Longus 5 3   Plantar Flexion 5 3      Sensation: decreased sensation on the lateral aspect of right calf/shin, top and lateral side of right foot and toes.     Reflexes Left/Right   Patellar 2+/2+   Ankle 2+/2+     Unable to perform right single leg calf raise.        Incision:  healing well.  No concerns.      IMAGING:  No new imaging.      ASSESSMENT:  Indigo Lamb is a 32 year old female who is s/p right L4-5 hemilaminectomy, microdiscectomy by Dr. Murray on 9/20/22. Per chart review, patient had pre-op symptoms of acute right leg pain with numbness.    Patient is now approximately 6 weeks out from surgery and is about 36 weeks pregnant.  She still endorses the numbness in her right calf and foot, but has no return of the muscle spasms or pain in her leg.  She also has weakness with plantar flexion of her right foot and is currently working with PT on this.  We discussed that these symptoms may  take up to a year to recover in some cases.      I do think it is okay for her to gradually return to light duty work at this time.  Given she is still having weakness in her foot and numbness, I do not want her to work her normal 12 hour shift or perform the strenuous activities that she normally performs to give her adequate time to heal.      PLAN:    Continue to use Tylenol, Flexeril, ice/heat for pain.      Okay to increase lifting gradually from the previous 15 pound limit, but nothing greater than 20-25 pounds at this time.  Avoid excessive bending/twisting activities.  Continue with PT.         Follow up in 6 weeks with Dr. Murray to see how you are doing.    RTW note provided.      Patient is in agreement with this plan and states no further questions.        Marilyn Delacruz PA-C  Department of Neurosurgery  Office: [phone]

## 2022-11-02 NOTE — LETTER
November 2, 2022    RE:  Indigo Lamb                              [address] [facility] [address]      To whom it may concern:    Indigo Lamb is under my professional care for lumbar disc herniation and surgery.      She is okay to return to light duty work starting half days three days per week for two weeks, then half days five days per week for two weeks, then return to work full time. Please allow patient take frequent rest breaks and have frequent position changes as needed for her healing.  She is not to lift/push/pull more than 20 pounds and should avoid excessive bending/twisting activities.        Sincerely,        Marilyn Delacruz PA-C

## 2022-11-02 NOTE — PATIENT INSTRUCTIONS
You saw Marilyn Delacruz PA-C today.  Marilyn provided you with a RTW note for light duty.  Observe less than 20-25 pounds and avoid activities that cause you discomfort.  Follow up in 6 weeks.  Please call in the interim if you have any questions or concerns. Good luck with your delivery!

## 2022-11-03 ENCOUNTER — PRENATAL OFFICE VISIT (OUTPATIENT)
Dept: OBGYN | Facility: CLINIC | Age: 32
End: 2022-11-03
Payer: COMMERCIAL

## 2022-11-03 VITALS
BODY MASS INDEX: 34.1 KG/M2 | HEART RATE: 93 BPM | HEIGHT: 65 IN | DIASTOLIC BLOOD PRESSURE: 73 MMHG | WEIGHT: 204.7 LBS | RESPIRATION RATE: 14 BRPM | SYSTOLIC BLOOD PRESSURE: 116 MMHG | TEMPERATURE: 98.4 F

## 2022-11-03 DIAGNOSIS — Z34.83 PRENATAL CARE, SUBSEQUENT PREGNANCY IN THIRD TRIMESTER: Primary | ICD-10-CM

## 2022-11-03 PROCEDURE — 87653 STREP B DNA AMP PROBE: CPT | Performed by: OBSTETRICS & GYNECOLOGY

## 2022-11-03 PROCEDURE — 99207 PR PRENATAL VISIT: CPT | Performed by: OBSTETRICS & GYNECOLOGY

## 2022-11-03 NOTE — NURSING NOTE
"Initial /73 (BP Location: Right arm, Patient Position: Sitting, Cuff Size: Adult Regular)   Pulse 93   Temp 98.4  F (36.9  C) (Tympanic)   Resp 14   Ht 1.651 m (5' 5\")   Wt 92.9 kg (204 lb 11.2 oz)   LMP 02/24/2022   BMI 34.06 kg/m   Estimated body mass index is 34.06 kg/m  as calculated from the following:    Height as of this encounter: 1.651 m (5' 5\").    Weight as of this encounter: 92.9 kg (204 lb 11.2 oz). .      "

## 2022-11-03 NOTE — PROGRESS NOTES
Concerns: she has seen her Neurosurgery team and been cleared for light duty  She will do a Zoom call with Anesthesiology  .  No vaginal bleeding, LOF, contractions.  No HA, RUQ pain, N/V, visual changes.  GBS done today.  Labor precautions discussed. She should be eligible to deliver here if she would like   RTC 1 week.  Prenatal flowsheet information is reviewed.    Nasim Rodriguez MD

## 2022-11-04 LAB — GP B STREP DNA SPEC QL NAA+PROBE: NEGATIVE

## 2022-11-07 NOTE — TELEPHONE ENCOUNTER
FUTURE VISIT INFORMATION      SURGERY INFORMATION:     TBD Dr. Joel OB    Consult: ov 11/3    RECORDS REQUESTED FROM:       Primary Care Provider: [employer]

## 2022-11-08 NOTE — TELEPHONE ENCOUNTER
FUTURE VISIT INFORMATION        SURGERY INFORMATION:     TBD Dr. Joel OB    Consult: ov 11/3     RECORDS REQUESTED FROM:         Primary Care Provider: [employer]

## 2022-11-09 ENCOUNTER — ANESTHESIA EVENT (OUTPATIENT)
Dept: SURGERY | Facility: CLINIC | Age: 32
End: 2022-11-09

## 2022-11-09 ENCOUNTER — PRE VISIT (OUTPATIENT)
Dept: SURGERY | Facility: CLINIC | Age: 32
End: 2022-11-09

## 2022-11-09 ENCOUNTER — VIRTUAL VISIT (OUTPATIENT)
Dept: SURGERY | Facility: CLINIC | Age: 32
End: 2022-11-09
Attending: OBSTETRICS & GYNECOLOGY
Payer: COMMERCIAL

## 2022-11-09 DIAGNOSIS — Z53.9 ERRONEOUS ENCOUNTER--DISREGARD: Primary | ICD-10-CM

## 2022-11-09 DIAGNOSIS — M51.26 LUMBAR DISC HERNIATION: ICD-10-CM

## 2022-11-09 PROCEDURE — 99207 PR NO BILLABLE SERVICE THIS VISIT: CPT | Performed by: NURSE PRACTITIONER

## 2022-11-09 ASSESSMENT — PAIN SCALES - GENERAL: PAINLEVEL: NO PAIN (0)

## 2022-11-09 NOTE — CONSULTS
Anesthesia Consult Note      Reason for consult:   High Risk OB consult  (M51.26) Lumbar disc herniation s/p Right hemilaminectomy with microdiscectomy at lumbar 4-5 with Dr. Murray on 2022 with patient 29w 5d.  Patient with concerns regarding the route of delivery, as well as neuraxial anesthesia.     Date of Encounter: 2022  Referring Physician: Dr. Lashon Coles  Primary Care Physician:  Maylin Monroy Zainab is a 32 year old woman who is currently  36w 6d. She is being seen in our clinic for high risk OB consult due to concern for type of anesthesia depending on the type of delivery in setting of recent lumbar spine surgery.  The patient has an OB history significant for:  acute disk herniation at the L4-L5 level s/p right L4-5 hemilaminectomy, microdiscectomy by Dr. Murray on 22.     Significant improvement in neurologic symptoms, but with residual right lower extremity numbness. She denies any return of the muscle spasms or pain in her leg.  She also has weakness with plantar flexion of her right foot and is currently working with PT on this. She presents to the PAC virtually in anesthesia consultation.     OB Hx:       /parity:      History of complications of pregnancy  No previous pregnancy complications or first pregnancy    History of obstetrical surgery  No previous obstetrical surgery    History of bleeding/coagulopathy  Antibody (+)   Last received Rhogam 2022    History of anesthesia issues in patient or 1st degree relative  No previous issues with anesthesia for the patient or a first degree relative    History is obtained from the patient.     Past Medical History  Past Medical History:   Diagnosis Date     Post partum depression        Past Surgical History  Past Surgical History:   Procedure Laterality Date     HC TOOTH EXTRACTION W/FORCEP      wisdom teeth     HEMILAMINECTOMY, DISCECTOMY LUMBAR ONE LEVEL, COMBINED Right 2022     Procedure: RIGHT HEMILAMINECTOMY,  WITH MICRODISCECTOMY L4-5;  Surgeon: Jenn Murray MD;  [facility]     SURGICAL HISTORY OF -   2008    Bone-patellar-bone left knee anterior cruciate ligament reconstruction       Prior to Admission Medications  Current Outpatient Medications   Medication Sig Dispense Refill     acetaminophen (TYLENOL) 325 MG tablet Take 3 tablets (975 mg) by mouth every 8 hours 45 tablet 0     cyclobenzaprine (FLEXERIL) 10 MG tablet Take 1 tablet (10 mg) by mouth 3 times daily as needed for muscle spasms (Patient taking differently: Take 5 mg by mouth At Bedtime) 30 tablet 1     Prenatal Vit-Fe Fumarate-FA (PRENATAL PO) Take 1 tablet by mouth every morning       sertraline (ZOLOFT) 50 MG tablet Take 1 tablet (50 mg) by mouth daily (Patient taking differently: Take 50 mg by mouth every morning) 90 tablet 3     Misc. Devices (BREAST PUMP) MISC 1 each as needed (Patient not taking: Reported on 10/19/2022) 1 each 0       Menstrual history: Patient's last menstrual period was 02/24/2022.:      Allergies  Allergies   Allergen Reactions     Pcn [Penicillins] Hives and Rash     Blistering on face, all over     Zithromax [Azithromycin Dihydrate] Rash       Social History  Social History     Socioeconomic History     Marital status:      Spouse name: Not on file     Number of children: Not on file     Years of education: Not on file     Highest education level: Not on file   Occupational History     Not on file   Tobacco Use     Smoking status: Never     Smokeless tobacco: Never     Tobacco comments:     parents smoke   Vaping Use     Vaping Use: Never used   Substance and Sexual Activity     Alcohol use: Not Currently     Comment: rare-quit with pregnancy     Drug use: No     Sexual activity: Yes     Partners: Male     Birth control/protection: Condom     Comment: boyfriend since 2010   Other Topics Concern     Parent/sibling w/ CABG, MI or angioplasty before 65F 55M? No   Social  History Narrative     Not on file     Social Determinants of Health     Financial Resource Strain: Not on file   Food Insecurity: Not on file   Transportation Needs: Not on file   Physical Activity: Not on file   Stress: Not on file   Social Connections: Not on file   Intimate Partner Violence: Not on file   Housing Stability: Not on file       Family History  Family History   Problem Relation Age of Onset     Anxiety Disorder Mother      Obesity Father      Allergies Brother      Hypertension Maternal Grandmother      Breast Cancer Maternal Grandmother      Heart Failure Maternal Grandfather      Alcoholism Maternal Grandfather      Other - See Comments Paternal Grandmother         father adopted     C.A.D. No family hx of      Diabetes No family hx of      Cerebrovascular Disease No family hx of      Cancer - colorectal No family hx of        The complete review of systems is negative other than noted in the HPI or here. Anesthesia Evaluation   Pt has had prior anesthetic. Type: General.    History of anesthetic complications   Reports being awake for intubation as aneshtesia had not quite hit with recent spine surgery>>>PTSD.  .    ROS/MED HX  ENT/Pulmonary:  - neg pulmonary ROS     Neurologic: Comment: S/p Right L4-5 hemilaminectomy microdiscectomy at   29w 5d.Right calf and foot still numb.        Cardiovascular:  - neg cardiovascular ROS     METS/Exercise Tolerance: >4 METS Comment: Prior to Spine surgery 9/2022, METs >4.  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    Hematologic:  - neg hematologic  ROS     Musculoskeletal:  - neg musculoskeletal ROS     GI/Hepatic:  - neg GI/hepatic ROS     Renal/Genitourinary:  - neg Renal ROS     Endo:  - neg endo ROS     Psychiatric/Substance Use:     (+) psychiatric history depression  (-) alcohol abuse history and chronic opioid use history   Infectious Disease: Comment: Completed COVID vaccine.     COVID (+) 2/2022:  Fever, aches and fatigue.  No  residual effects.  - neg infectious disease ROS     Malignancy:  - neg malignancy ROS     Other: Comment:  36w 6d             Virtual visit -  No vitals were obtained    Physical Exam  Constitutional: Awake, alert, cooperative, no apparent distress, and appears stated age.  HENT: Normocephalic  Respiratory: Regular respirations.  Easy, quiet breathing.   Cardiovascular: Coloring appears appropriate.   Musculoskeletal: Full ROM of neck.   Neurologic: Awake, alert, oriented to name, place and time.   Neuropsychiatric: Calm, cooperative. Normal affect.     Labs / testing: (personally reviewed)  Lab Results   Component Value Date    WBC 8.5 08/15/2022    WBC 11.7 2020     Lab Results   Component Value Date    RBC 3.83 08/15/2022    RBC 4.08 2020     Lab Results   Component Value Date    HGB 11.6 08/15/2022    HGB 12.0 2020     Lab Results   Component Value Date    HCT 35.4 08/15/2022    HCT 36.1 2020     Lab Results   Component Value Date    MCV 92 08/15/2022    MCV 89 2020     Lab Results   Component Value Date    MCH 30.3 08/15/2022    MCH 29.4 2020     Lab Results   Component Value Date    MCHC 32.8 08/15/2022    MCHC 33.2 2020     Lab Results   Component Value Date    RDW 13.5 08/15/2022    RDW 13.5 2020     Lab Results   Component Value Date     08/15/2022     2020       Last Comprehensive Metabolic Panel:  Sodium   Date Value Ref Range Status   2018 139 133 - 144 mmol/L Final     Potassium   Date Value Ref Range Status   2018 4.3 3.4 - 5.3 mmol/L Final     Chloride   Date Value Ref Range Status   2018 107 94 - 109 mmol/L Final     Carbon Dioxide   Date Value Ref Range Status   2018 26 20 - 32 mmol/L Final     Anion Gap   Date Value Ref Range Status   2018 6 3 - 14 mmol/L Final     Glucose   Date Value Ref Range Status   2018 95 70 - 99 mg/dL Final     Comment:     Non Fasting     GLUCOSE BY METER POCT    Date Value Ref Range Status   09/22/2022 118 (H) 70 - 99 mg/dL Final     Urea Nitrogen   Date Value Ref Range Status   07/23/2018 14 7 - 30 mg/dL Final     Creatinine   Date Value Ref Range Status   07/23/2018 0.82 0.52 - 1.04 mg/dL Final     GFR Estimate   Date Value Ref Range Status   07/23/2018 84 >60 mL/min/1.7m2 Final     Comment:     Non  GFR Calc     Calcium   Date Value Ref Range Status   07/23/2018 8.9 8.5 - 10.1 mg/dL Final         Outside records reviewed from:  Care Everywhere    Assessment  Indigo Lamb is a 32 year old female seen as a PAC referral for risk assessment and optimization for anesthesia.    Plan/Recommendations  Pt will be optimized for the proposed procedure.  See below for details on the assessment, risk, and preoperative recommendations    NEUROLOGY  - No history of TIA, CVA or seizure     - Disk herniation of L4-L5 with symptoms of acute right leg pain with numbness.  S/p L4-5 hemilaminectomy, microdiscectomy by Dr. Murray on 9/20/22.  Preoperative pain has resolved with slow improvement with PT in the numbness on the lateral aspect of her right calf and foot.  Dfficulty with plantar flexion of her foot.   -Post Op delirium risk factors:  No risk identified    ENT  - No current airway concerns.  Will need to be reassessed day of surgery.  Mallampati: Unable to assess  TM: Unable to assess    CARDIAC  - Denies known coronary artery disease.  Denies cardiac symptoms.    - METS (Metabolic Equivalents)>4 prior to lumbar spine surgery.  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  Now on light duty 2/2 spine surgery.     - RCRI-Very low risk: Class 1 0.4% complication rate            Total Score: 0        PULMONARY  ROSETTA Low Risk            Total Score: 1    ROSETTA: Often tired      - Denies asthma or inhaler use  - Tobacco History    History   Smoking Status     Never   Smokeless Tobacco     Never       GI  - Denies GERD  PONV Medium Risk  Total Score: 2         "   1 AN PONV: Pt is Female    1 AN PONV: Patient is not a current smoker        /RENAL  - Difficulty with urinary retention with post void residual requiring repeated straight catheterization after delivery with her first child with significant tear. Recurrence of urinary retention with post void residual after recent spine surgery requiring catheterization.    - Baseline Creatinine  See above       ENDOCRINE   - BMI: Estimated body mass index is 34.06 kg/m  as calculated from the following:    Height as of 11/3/22: 1.651 m (5' 5\").    Weight as of 11/3/22: 92.9 kg (204 lb 11.2 oz).  Overweight (BMI 25.0-29.9)  - No history of Diabetes Mellitus    HEME  VTE Low Risk 0.26%            Total Score: 0      - No history of abnormal bleeding or antiplatelet use.  - Hgb 11.6 with Antibody (+). Rhogam on 9/7/2022.    PSYCH  - Depression, doing well on sertraline.     ANESTHESIA  - Repots traumatic experience with recent anesthesia for spine surgery-reports she was still awake when she was intubated for her recent spine surgery.      - Difficult IV start requiring ultrasound.     -  Tentative Obstetrical Anesthesia Treatment plan developed in collaboration with the attending anesthesiologist Dr. Lopez.  Genaro Maya or Kimberly Ribera from OB Anesthesia will be calling patient to discuss anesthesia options further with patient.         Please refer to the physical examination documented by the anesthesiologist in the anesthesia record on the day of surgery.    Video-Visit Details    Type of service:  Video Visit    Provider received verbal consent for a Video Visit from the patient? Yes    Video Start Time: 15:40  Video End Time (time video stopped): 16:00    Originating Location (pt. Location): Home  Distant Location (provider location): Off-site    Mode of Communication:  Video Conference via [employer]  On the day of service:     Prep time: 16 minutes  Visit time: 20 minutes  Documentation time: 20 " minutes  ------------------------------------------  Total time: 56 minutes    ZUNILDA Bauer CNP    Preoperative Assessment Center  [facility]  Office phone: [phone]  Fax: [phone]

## 2022-11-09 NOTE — PROGRESS NOTES
Indigo is a 32 year old who is being evaluated via a billable video visit.      How would you like to obtain your AVS? MyChart        HPI         Review of Systems     Objective    Vitals - Patient Reported  Pain Score: No Pain (0)        Physical Exam         SAIGE Street LPN

## 2022-11-09 NOTE — H&P
Pre-Operative H & P     CC:  Preoperative exam to assess for increased cardiopulmonary risk while undergoing surgery and anesthesia.    OPENED IN ERROR - PLEASE SEE ANESTHESIA CONSULT DONE THE SAME DAY.     Allergies  Allergies   Allergen Reactions     Pcn [Penicillins] Hives and Rash     Blistering on face, all over     Zithromax [Azithromycin Dihydrate] Rash       Social History  Social History     Socioeconomic History     Marital status:      Spouse name: Not on file     Number of children: Not on file     Years of education: Not on file     Highest education level: Not on file   Occupational History     Not on file   Tobacco Use     Smoking status: Never     Smokeless tobacco: Never     Tobacco comments:     parents smoke   Vaping Use     Vaping Use: Never used   Substance and Sexual Activity     Alcohol use: Not Currently     Comment: rare-quit with pregnancy     Drug use: No     Sexual activity: Yes     Partners: Male     Birth control/protection: Condom     Comment: boyfriend since 2010   Other Topics Concern     Parent/sibling w/ CABG, MI or angioplasty before 65F 55M? No   Social History Narrative     Not on file     Social Determinants of Health     Financial Resource Strain: Not on file   Food Insecurity: Not on file   Transportation Needs: Not on file   Physical Activity: Not on file   Stress: Not on file   Social Connections: Not on file   Intimate Partner Violence: Not on file   Housing Stability: Not on file       Family History  Family History   Problem Relation Age of Onset     Anxiety Disorder Mother      Obesity Father      Allergies Brother      Hypertension Maternal Grandmother      Breast Cancer Maternal Grandmother      Heart Failure Maternal Grandfather      Alcoholism Maternal Grandfather      Other - See Comments Paternal Grandmother         father adopted     C.A.D. No family hx of      Diabetes No family hx of      Cerebrovascular Disease No family hx of      Cancer - colorectal  No family hx of        Review of Systems  The complete review of systems is negative other than noted in the HPI or here.

## 2022-11-10 ENCOUNTER — HOSPITAL ENCOUNTER (OUTPATIENT)
Dept: ULTRASOUND IMAGING | Facility: CLINIC | Age: 32
Discharge: HOME | End: 2022-11-10
Attending: OBSTETRICS & GYNECOLOGY | Admitting: OBSTETRICS & GYNECOLOGY
Payer: COMMERCIAL

## 2022-11-10 DIAGNOSIS — O98.512 COVID-19 AFFECTING PREGNANCY IN SECOND TRIMESTER: ICD-10-CM

## 2022-11-10 DIAGNOSIS — U07.1 COVID-19 AFFECTING PREGNANCY IN SECOND TRIMESTER: ICD-10-CM

## 2022-11-10 PROCEDURE — 76816 OB US FOLLOW-UP PER FETUS: CPT

## 2022-11-11 ENCOUNTER — PRENATAL OFFICE VISIT (OUTPATIENT)
Dept: OBGYN | Facility: CLINIC | Age: 32
End: 2022-11-11
Payer: COMMERCIAL

## 2022-11-11 VITALS
WEIGHT: 206 LBS | RESPIRATION RATE: 16 BRPM | DIASTOLIC BLOOD PRESSURE: 60 MMHG | HEART RATE: 91 BPM | SYSTOLIC BLOOD PRESSURE: 119 MMHG | TEMPERATURE: 97.6 F | BODY MASS INDEX: 34.32 KG/M2 | HEIGHT: 65 IN

## 2022-11-11 DIAGNOSIS — Z34.83 PRENATAL CARE, SUBSEQUENT PREGNANCY IN THIRD TRIMESTER: Primary | ICD-10-CM

## 2022-11-11 PROCEDURE — 99207 PR PRENATAL VISIT: CPT | Performed by: OBSTETRICS & GYNECOLOGY

## 2022-11-11 NOTE — PROGRESS NOTES
"[facility]   OB/GYN Clinic    CC: Return OB     Subjective:    Indigo is a 32 year old  at 37w1d by Patient's last menstrual period was 2022. who presents for return OB visit. She reports feeling well. Denies uterine cramping, vaginal bleeding or leaking, dysuria. +fetal movement. Discussed her recent back surgery @30wks, its sxs, and how it may affect the pain control plan.      Objective:  /60 (BP Location: Left arm, Patient Position: Chair, Cuff Size: Adult Regular)   Pulse 91   Temp 97.6  F (36.4  C) (Tympanic)   Resp 16   Ht 1.651 m (5' 5\")   Wt 93.4 kg (206 lb)   LMP 2022   BMI 34.28 kg/m      Estimated body mass index is 34.28 kg/m  as calculated from the following:    Height as of this encounter: 1.651 m (5' 5\").    Weight as of this encounter: 93.4 kg (206 lb).    Physical Exam:  Gen: Pleasant, talkative female in no apparent distress   Respiratory: breathing comfortably on room air   Cardiac: Warm and well-perfused.   GI: Abd soft and non-tender, gravid  : unremarkable cervical exam, see flow sheet for measurments  MSK: Grossly normal movement of all four extremities  Psych: mood and affect bright   Lower extremity: trace pitting edema    Fetal dop tones: 140 bpm  Fundal height: 36.5cm    Assessment/Plan:   32 year old  at 37w1d by LMP of Patient's last menstrual period was 2022. who presents for follow-up OB visit.   1) Continue prenatal care  2) Will defer Covid-19 booster and flu vaccine  3) Covid + in pregnancy, f/u US @36wks wnl.  3)Reviewed 3rd trimester precautions (call clinic or present to OB triage/ED with vaginal bleeding, cramping, decreased fetal movement, inability to take po)  4) Delivery plan: aiming for  and serrano cath due to urinary retention last delivery, breastfeeding and bottle feeding, pp contraception via progesterone pills, described options for pain management in labor - continue to discuss with patient pending " recs from [facility] OB anesthesia. Dr. Rodriguez spoke with [facility] anesthesia here and they are ok with her delivering here.     Return to clinic in 1 week. Labor and FM precautions reviewed.     This patient was seen and discussed with my attending physician.  Maximiliano Dash, M3       I agree with the medical student's documentation above and have edited it for accuracy. I was present for and performed the physical exam and interview of the patient myself. All medical decision-making was performed by me. If a procedure was performed, that was performed by me. Additionally, if billing is based on time, I am only using the time that I personally spent with the patient in my time statement.     Charlene Celis MD  OB/GYN

## 2022-11-11 NOTE — PROGRESS NOTES
"Initial /60 (BP Location: Left arm, Patient Position: Chair, Cuff Size: Adult Regular)   Pulse 91   Temp 97.6  F (36.4  C) (Tympanic)   Resp 16   Ht 1.651 m (5' 5\")   Wt 93.4 kg (206 lb)   LMP 02/24/2022   BMI 34.28 kg/m   Estimated body mass index is 34.28 kg/m  as calculated from the following:    Height as of this encounter: 1.651 m (5' 5\").    Weight as of this encounter: 93.4 kg (206 lb). .      "

## 2022-11-17 ENCOUNTER — PRENATAL OFFICE VISIT (OUTPATIENT)
Dept: OBGYN | Facility: CLINIC | Age: 32
End: 2022-11-17
Payer: COMMERCIAL

## 2022-11-17 VITALS
SYSTOLIC BLOOD PRESSURE: 115 MMHG | HEIGHT: 65 IN | WEIGHT: 208 LBS | BODY MASS INDEX: 34.66 KG/M2 | TEMPERATURE: 98.7 F | HEART RATE: 109 BPM | DIASTOLIC BLOOD PRESSURE: 69 MMHG | RESPIRATION RATE: 18 BRPM

## 2022-11-17 DIAGNOSIS — Z98.890 HISTORY OF BACK SURGERY: ICD-10-CM

## 2022-11-17 DIAGNOSIS — Z34.83 PRENATAL CARE, SUBSEQUENT PREGNANCY IN THIRD TRIMESTER: Primary | ICD-10-CM

## 2022-11-17 PROCEDURE — 99207 PR PRENATAL VISIT: CPT | Performed by: ADVANCED PRACTICE MIDWIFE

## 2022-11-17 NOTE — NURSING NOTE
"Initial /69 (BP Location: Right arm, Patient Position: Chair, Cuff Size: Adult Regular)   Pulse 109   Temp 98.7  F (37.1  C) (Tympanic)   Resp 18   Ht 1.651 m (5' 5\")   Wt 94.3 kg (208 lb)   LMP 02/24/2022   BMI 34.61 kg/m   Estimated body mass index is 34.61 kg/m  as calculated from the following:    Height as of this encounter: 1.651 m (5' 5\").    Weight as of this encounter: 94.3 kg (208 lb). .          "

## 2022-11-17 NOTE — PROGRESS NOTES
Feeling well.  Baby is active. Denies any leaking of fluid, vaginal bleeding, regular uterine contractions, or headaches or other concerns.  Continues to heal after back surgery.    Reviewed to call for contractions, loss of fluid, vaginal bleeding, decreased fetal movement or any other questions or concerns.    RTC in 1 weeks.  Michelle Lynn, BRIAN, APRN, CNM

## 2022-11-18 ENCOUNTER — HOSPITAL ENCOUNTER (EMERGENCY)
Facility: CLINIC | Age: 32
Discharge: HOME | End: 2022-11-18
Attending: PHYSICIAN ASSISTANT | Admitting: PHYSICIAN ASSISTANT
Payer: COMMERCIAL

## 2022-11-18 VITALS
RESPIRATION RATE: 18 BRPM | SYSTOLIC BLOOD PRESSURE: 104 MMHG | HEART RATE: 134 BPM | TEMPERATURE: 99.4 F | OXYGEN SATURATION: 98 % | DIASTOLIC BLOOD PRESSURE: 67 MMHG

## 2022-11-18 DIAGNOSIS — J10.1 INFLUENZA A: ICD-10-CM

## 2022-11-18 LAB
DEPRECATED S PYO AG THROAT QL EIA: NEGATIVE
FLUAV RNA SPEC QL NAA+PROBE: POSITIVE
FLUBV RNA RESP QL NAA+PROBE: NEGATIVE
GROUP A STREP BY PCR: NOT DETECTED
SARS-COV-2 RNA RESP QL NAA+PROBE: NEGATIVE

## 2022-11-18 PROCEDURE — G0463 HOSPITAL OUTPT CLINIC VISIT: HCPCS | Mod: CS | Performed by: PHYSICIAN ASSISTANT

## 2022-11-18 PROCEDURE — 87636 SARSCOV2 & INF A&B AMP PRB: CPT | Performed by: PHYSICIAN ASSISTANT

## 2022-11-18 PROCEDURE — 99213 OFFICE O/P EST LOW 20 MIN: CPT | Mod: CS | Performed by: PHYSICIAN ASSISTANT

## 2022-11-18 PROCEDURE — C9803 HOPD COVID-19 SPEC COLLECT: HCPCS | Performed by: PHYSICIAN ASSISTANT

## 2022-11-18 PROCEDURE — 87651 STREP A DNA AMP PROBE: CPT | Performed by: PHYSICIAN ASSISTANT

## 2022-11-18 ASSESSMENT — ENCOUNTER SYMPTOMS
MYALGIAS: 1
APPETITE CHANGE: 1
FATIGUE: 1
COUGH: 1
RHINORRHEA: 1
CHILLS: 1
GASTROINTESTINAL NEGATIVE: 1
FEVER: 0
ACTIVITY CHANGE: 1
WHEEZING: 0
SORE THROAT: 1
DIAPHORESIS: 0
CARDIOVASCULAR NEGATIVE: 1
NEUROLOGICAL NEGATIVE: 1
SHORTNESS OF BREATH: 0
EYES NEGATIVE: 1

## 2022-11-18 ASSESSMENT — ACTIVITIES OF DAILY LIVING (ADL): ADLS_ACUITY_SCORE: 35

## 2022-11-18 NOTE — ED PROVIDER NOTES
History     Chief Complaint   Patient presents with     Pharyngitis     Sore throat, cough beginning two days ago     HPI  Indigo Lamb is a 32 year old female who presents today with sore throat, cough, myalgia, feeling run down for the past 2-3 days. No fevers, shortness of breath, chest pain, abdominal pain, nausea/vomiting, diarrhea, or rash. Patient denies ear pain or drainage. Patient is currently 38 weeks pregnant with normal OB appt yesterday. She has been taking tylenol for symptoms.  No known exposures but states that her son started with symptoms a couple days prior to her symptoms starting.    Allergies:  Allergies   Allergen Reactions     Pcn [Penicillins] Hives and Rash     Blistering on face, all over     Zithromax [Azithromycin Dihydrate] Rash       Problem List:    Patient Active Problem List    Diagnosis Date Noted     Lumbar disc herniation 09/19/2022     Priority: Medium     Prenatal care, subsequent pregnancy 04/05/2022     Priority: Medium     Rh negative state in antepartum period 11/25/2020     Priority: Medium     Vestibular dysfunction, unspecified laterality 08/28/2019     Priority: Medium     Normal MRI.  Saw [facility] 8/9/19 and referred to ENT       Family history of nephrolithiasis 06/02/2014     Priority: Medium     Dysmenorrhea 10/19/2012     Priority: Medium     Better with oral contraceptive pills       Recurrent UTI 10/19/2012     Priority: Medium     CARDIOVASCULAR SCREENING; LDL GOAL LESS THAN 160 05/29/2012     Priority: Medium        Past Medical History:    Past Medical History:   Diagnosis Date     Lumbar disc herniation      Post partum depression 2020       Past Surgical History:    Past Surgical History:   Procedure Laterality Date     HC TOOTH EXTRACTION W/FORCEP      wisdom teeth     HEMILAMINECTOMY, DISCECTOMY LUMBAR ONE LEVEL, COMBINED Right 9/20/2022    Procedure: RIGHT HEMILAMINECTOMY,  WITH MICRODISCECTOMY L4-5;  Surgeon: Jenn Murray MD;   Location: [facility] OR     SURGICAL HISTORY OF -   2008    Bone-patellar-bone left knee anterior cruciate ligament reconstruction       Family History:    Family History   Problem Relation Age of Onset     Anxiety Disorder Mother      Obesity Father      Allergies Brother      Hypertension Maternal Grandmother      Breast Cancer Maternal Grandmother      Heart Failure Maternal Grandfather      Alcoholism Maternal Grandfather      Other - See Comments Paternal Grandmother         father adopted     C.A.D. No family hx of      Diabetes No family hx of      Cerebrovascular Disease No family hx of      Cancer - colorectal No family hx of        Social History:  Marital Status:   [2]  Social History     Tobacco Use     Smoking status: Never     Smokeless tobacco: Never     Tobacco comments:     parents smoke   Vaping Use     Vaping Use: Never used   Substance Use Topics     Alcohol use: Not Currently     Comment: rare-quit with pregnancy     Drug use: No        Medications:    acetaminophen (TYLENOL) 325 MG tablet  Prenatal Vit-Fe Fumarate-FA (PRENATAL PO)  sertraline (ZOLOFT) 50 MG tablet  cyclobenzaprine (FLEXERIL) 10 MG tablet  Misc. Devices (BREAST PUMP) MISC          Review of Systems   Constitutional: Positive for activity change, appetite change, chills and fatigue. Negative for diaphoresis and fever.   HENT: Positive for congestion, rhinorrhea and sore throat. Negative for ear pain.    Eyes: Negative.    Respiratory: Positive for cough. Negative for shortness of breath and wheezing.    Cardiovascular: Negative.    Gastrointestinal: Negative.    Genitourinary: Negative.    Musculoskeletal: Positive for myalgias.   Skin: Negative.    Neurological: Negative.    All other systems reviewed and are negative.      Physical Exam   BP: 105/47  Pulse: (!) 134  Temp: 99.4  F (37.4  C)  Resp: 18  SpO2: 98 %      Physical Exam  Vitals and nursing note reviewed.   Constitutional:       General: She is not in acute distress.      Appearance: Normal appearance. She is normal weight. She is not ill-appearing, toxic-appearing or diaphoretic.   HENT:      Right Ear: Tympanic membrane and ear canal normal.      Left Ear: Tympanic membrane and ear canal normal.      Nose: Congestion present.      Mouth/Throat:      Mouth: Mucous membranes are moist.      Pharynx: Oropharynx is clear. Posterior oropharyngeal erythema (minimal) present. No oropharyngeal exudate.   Eyes:      General: No scleral icterus.     Extraocular Movements: Extraocular movements intact.      Conjunctiva/sclera: Conjunctivae normal.      Pupils: Pupils are equal, round, and reactive to light.   Cardiovascular:      Rate and Rhythm: Normal rate and regular rhythm.      Heart sounds: Normal heart sounds.   Pulmonary:      Effort: Pulmonary effort is normal.      Breath sounds: Normal breath sounds.   Musculoskeletal:      Cervical back: Normal range of motion and neck supple. No rigidity.   Lymphadenopathy:      Cervical: Cervical adenopathy present.   Skin:     General: Skin is warm.      Capillary Refill: Capillary refill takes less than 2 seconds.      Findings: No rash.   Neurological:      General: No focal deficit present.      Mental Status: She is alert and oriented to person, place, and time.   Psychiatric:         Mood and Affect: Mood normal.         Behavior: Behavior normal.         Thought Content: Thought content normal.         Judgment: Judgment normal.         ED Course                 Procedures             Critical Care time:  none               Results for orders placed or performed during the hospital encounter of 11/18/22 (from the past 24 hour(s))   Streptococcus A Rapid Scr w Reflx to PCR    Specimen: Throat; Swab   Result Value Ref Range    Group A Strep antigen Negative Negative   Symptomatic; Yes; 11/16/2022 Influenza A/B & SARS-CoV2 (COVID-19) Virus PCR Multiplex Nasopharyngeal    Specimen: Nasopharyngeal; Swab   Result Value Ref Range    Influenza A  PCR Positive (A) Negative    Influenza B PCR Negative Negative    SARS CoV2 PCR Negative Negative    Narrative    Testing was performed using the laly SARS-CoV-2 & Influenza A/B Assay on the [employer] System. This test should be ordered for the detection of SARS-CoV-2 and influenza viruses in individuals who meet clinical and/or epidemiological criteria. Test performance is unknown in asymptomatic patients. This test is for in vitro diagnostic use under the FDA EUA for laboratories certified under CLIA to perform moderate and/or high complexity testing. This test has not been FDA cleared or approved. A negative result does not rule out the presence of PCR inhibitors in the specimen or target RNA in concentration below the limit of detection for the assay. If only one viral target is positive but coinfection with multiple targets is suspected, the sample should be re-tested with another FDA cleared, approved or authorized test, if coinfection would change clinical management. [facility] are certified under the Clinical Laboratory Improvement Amendments of 1988 (CLIA-88) as qualified to perform moderate and/or high complexity laboratory testing.   Group A Streptococcus PCR Throat Swab    Specimen: Throat; Swab   Result Value Ref Range    Group A strep by PCR Not Detected Not Detected    Narrative    The Xpert Xpress Strep A test, performed on the [employer], is a rapid, qualitative in vitro diagnostic test for the detection of Streptococcus pyogenes (Group A ß-hemolytic Streptococcus, Strep A) in throat swab specimens from patients with signs and symptoms of pharyngitis. The Xpert Xpress Strep A test can be used as an aid in the diagnosis of Group A Streptococcal pharyngitis. The assay is not intended to monitor treatment for Group A Streptococcus infections. The Xpert Xpress Strep A test utilizes an automated real-time polymerase chain reaction (PCR) to detect Streptococcus  pyogenes DNA.       Medications - No data to display    Assessments & Plan (with Medical Decision Making)     I have reviewed the nursing notes.    I have reviewed the findings, diagnosis, plan and need for follow up with the patient.    Indigo Lamb is a 32 year old female who presents today with sore throat, cough, myalgia, feeling run down for the past 2-3 days. No fevers, shortness of breath, chest pain, abdominal pain, nausea/vomiting, diarrhea, or rash. Patient denies ear pain or drainage. Patient is currently 38 weeks pregnant with normal OB appt yesterday. She has been taking tylenol for symptoms.  No known exposures but states that her son started with symptoms a couple days prior to her symptoms starting.    See exam findings above.  Patient does not appear toxic and in no acute distress.  Influenza A came back positive today.  COVID and strep today were negative.  Throat culture sent and currently pending.  Discussed Tamiflu for treatment of influenza but patient states she declined this at this time.  Symptomatic treatments discussed and given on discharge paperwork.  Patient discharged in stable condition informed to go to the emergency department symptoms worsen or change.  Patient is slightly tachycardic today unfortunately nurse forgot to recheck patient's pulse at time of discharge.  I suspect that the tachycardia is likely related to low-grade temp and influenza A coming back positive.  Recommend close follow-up with primary care doctor and OB/GYN.  Patient in agreement with plan and discharged in stable condition.    Discharge Medication List as of 11/18/2022  1:25 PM          Final diagnoses:   Influenza A       11/18/2022   [facility] EMERGENCY DEPT

## 2022-11-18 NOTE — DISCHARGE INSTRUCTIONS
Tylenol, increase fluids, rest, nasal saline sprays, cool humidifier, will warm compresses to the sinuses, over-the-counter cough medication that safe during pregnancy is fine for cough.    Contagious until you are fever free for 24 hours off of Tylenol and ibuprofen.    Make sure that you stay well-hydrated    Go to the emergency department if symptoms worsen or change   none

## 2022-11-19 ENCOUNTER — HEALTH MAINTENANCE LETTER (OUTPATIENT)
Age: 32
End: 2022-11-19

## 2022-11-21 ENCOUNTER — HOSPITAL ENCOUNTER (OUTPATIENT)
Dept: PHYSICAL THERAPY | Facility: CLINIC | Age: 32
Setting detail: THERAPIES SERIES
Discharge: HOME | End: 2022-11-21
Payer: COMMERCIAL

## 2022-11-21 PROCEDURE — 97110 THERAPEUTIC EXERCISES: CPT | Mod: GP | Performed by: PHYSICAL THERAPIST

## 2022-11-28 ENCOUNTER — PRENATAL OFFICE VISIT (OUTPATIENT)
Dept: OBGYN | Facility: CLINIC | Age: 32
End: 2022-11-28
Payer: COMMERCIAL

## 2022-11-28 VITALS
HEART RATE: 86 BPM | WEIGHT: 206 LBS | TEMPERATURE: 98.3 F | BODY MASS INDEX: 34.32 KG/M2 | DIASTOLIC BLOOD PRESSURE: 72 MMHG | RESPIRATION RATE: 16 BRPM | HEIGHT: 65 IN | SYSTOLIC BLOOD PRESSURE: 128 MMHG

## 2022-11-28 DIAGNOSIS — Z34.83 PRENATAL CARE, SUBSEQUENT PREGNANCY IN THIRD TRIMESTER: Primary | ICD-10-CM

## 2022-11-28 PROCEDURE — 99207 PR PRENATAL VISIT: CPT | Performed by: OBSTETRICS & GYNECOLOGY

## 2022-11-28 PROCEDURE — 59426 ANTEPARTUM CARE ONLY: CPT | Performed by: OBSTETRICS & GYNECOLOGY

## 2022-11-28 NOTE — PROGRESS NOTES
"Initial /72 (BP Location: Left arm, Patient Position: Chair, Cuff Size: Adult Regular)   Pulse 86   Temp 98.3  F (36.8  C) (Tympanic)   Resp 16   Ht 1.651 m (5' 5\")   Wt 93.4 kg (206 lb)   LMP 02/24/2022   BMI 34.28 kg/m   Estimated body mass index is 34.28 kg/m  as calculated from the following:    Height as of this encounter: 1.651 m (5' 5\").    Weight as of this encounter: 93.4 kg (206 lb). .      "

## 2022-11-29 NOTE — PROGRESS NOTES
"[facility]   OB/GYN Clinic     CC: Return OB      Subjective:     Indigo is a 32 year old  at 39w4d by Patient's last menstrual period was 2022. who presents for return OB visit. She reports feeling well. Denies uterine cramping, vaginal bleeding or leaking, dysuria. +fetal movement.      Objective:  /72 (BP Location: Left arm, Patient Position: Chair, Cuff Size: Adult Regular)   Pulse 86   Temp 98.3  F (36.8  C) (Tympanic)   Resp 16   Ht 1.651 m (5' 5\")   Wt 93.4 kg (206 lb)   LMP 2022   BMI 34.28 kg/m       Physical Exam:  Gen: Pleasant, talkative female in no apparent distress   Respiratory: breathing comfortably on room air   Cardiac: Warm and well-perfused.   GI: Abd soft and non-tender, gravid  : unremarkable cervical exam, see flow sheet for measurments  MSK: Grossly normal movement of all four extremities  Psych: mood and affect bright   Lower extremity: trace pitting edema     See OB flowsheet     Assessment/Plan:   32 year old  at 39w5d by LMP of Patient's last menstrual period was 2022. who presents for follow-up OB visit.   1) Continue prenatal care  2) Will defer Covid-19 booster and flu vaccine  3) Covid + in pregnancy, f/u US @36wks wnl.  3)Reviewed 3rd trimester precautions (call clinic or present to OB triage/ED with vaginal bleeding, cramping, decreased fetal movement, inability to take po)  4) Delivery plan: aiming for  and serrano cath due to urinary retention last delivery, breastfeeding and bottle feeding, pp contraception via progesterone pills, described options for pain management in labor - continue to discuss with patient pending recs from [facility] OB anesthesia. Dr. Rodriguez spoke with [facility] anesthesia here and they are ok with her delivering here. Planning epidural.      Return to clinic in 1 week. Labor and FM precautions reviewed. Knows recommendation for IOL at 41wks.     Charlene Celis MD  OB/GYN   "

## 2022-11-30 ENCOUNTER — ANESTHESIA (OUTPATIENT)
Dept: OBGYN | Facility: CLINIC | Age: 32
End: 2022-11-30
Payer: COMMERCIAL

## 2022-11-30 ENCOUNTER — ANESTHESIA EVENT (OUTPATIENT)
Dept: OBGYN | Facility: CLINIC | Age: 32
End: 2022-11-30
Payer: COMMERCIAL

## 2022-11-30 ENCOUNTER — HOSPITAL ENCOUNTER (INPATIENT)
Facility: CLINIC | Age: 32
LOS: 1 days | Discharge: HOME HEALTH | End: 2022-12-01
Attending: OBSTETRICS & GYNECOLOGY | Admitting: OBSTETRICS & GYNECOLOGY
Payer: COMMERCIAL

## 2022-11-30 PROBLEM — Z37.9 NORMAL LABOR: Status: ACTIVE | Noted: 2022-11-30

## 2022-11-30 LAB
ABO/RH(D): NORMAL
ABO/RH(D): NORMAL
ANTIBODY SCREEN: NEGATIVE
FETAL BLEED SCREEN: NORMAL
HGB BLD-MCNC: 12.8 G/DL (ref 11.7–15.7)
HOLD SPECIMEN: NORMAL
PLATELET # BLD AUTO: 205 10E3/UL (ref 150–450)
SARS-COV-2 RNA RESP QL NAA+PROBE: NEGATIVE
SPECIMEN EXPIRATION DATE: NORMAL
SPECIMEN EXPIRATION DATE: NORMAL
T PALLIDUM AB SER QL: NONREACTIVE

## 2022-11-30 PROCEDURE — 250N000009 HC RX 250

## 2022-11-30 PROCEDURE — 3E0R3BZ INTRODUCTION OF ANESTHETIC AGENT INTO SPINAL CANAL, PERCUTANEOUS APPROACH: ICD-10-PCS | Performed by: STUDENT IN AN ORGANIZED HEALTH CARE EDUCATION/TRAINING PROGRAM

## 2022-11-30 PROCEDURE — 36415 COLL VENOUS BLD VENIPUNCTURE: CPT

## 2022-11-30 PROCEDURE — 250N000011 HC RX IP 250 OP 636: Performed by: STUDENT IN AN ORGANIZED HEALTH CARE EDUCATION/TRAINING PROGRAM

## 2022-11-30 PROCEDURE — 250N000011 HC RX IP 250 OP 636

## 2022-11-30 PROCEDURE — 370N000003 HC ANESTHESIA WARD SERVICE

## 2022-11-30 PROCEDURE — 250N000013 HC RX MED GY IP 250 OP 250 PS 637

## 2022-11-30 PROCEDURE — 85049 AUTOMATED PLATELET COUNT: CPT

## 2022-11-30 PROCEDURE — 59409 OBSTETRICAL CARE: CPT | Mod: GC | Performed by: OBSTETRICS & GYNECOLOGY

## 2022-11-30 PROCEDURE — 85018 HEMOGLOBIN: CPT

## 2022-11-30 PROCEDURE — 120N000002 HC R&B MED SURG/OB UMMC

## 2022-11-30 PROCEDURE — G0463 HOSPITAL OUTPT CLINIC VISIT: HCPCS

## 2022-11-30 PROCEDURE — 722N000001 HC LABOR CARE VAGINAL DELIVERY SINGLE

## 2022-11-30 PROCEDURE — 250N000009 HC RX 250: Performed by: STUDENT IN AN ORGANIZED HEALTH CARE EDUCATION/TRAINING PROGRAM

## 2022-11-30 PROCEDURE — 00HU33Z INSERTION OF INFUSION DEVICE INTO SPINAL CANAL, PERCUTANEOUS APPROACH: ICD-10-PCS | Performed by: STUDENT IN AN ORGANIZED HEALTH CARE EDUCATION/TRAINING PROGRAM

## 2022-11-30 PROCEDURE — 0KQM0ZZ REPAIR PERINEUM MUSCLE, OPEN APPROACH: ICD-10-PCS | Performed by: OBSTETRICS & GYNECOLOGY

## 2022-11-30 PROCEDURE — 86850 RBC ANTIBODY SCREEN: CPT

## 2022-11-30 PROCEDURE — 86780 TREPONEMA PALLIDUM: CPT

## 2022-11-30 PROCEDURE — 85461 HEMOGLOBIN FETAL: CPT

## 2022-11-30 PROCEDURE — 258N000003 HC RX IP 258 OP 636

## 2022-11-30 PROCEDURE — 86901 BLOOD TYPING SEROLOGIC RH(D): CPT

## 2022-11-30 PROCEDURE — U0005 INFEC AGEN DETEC AMPLI PROBE: HCPCS

## 2022-11-30 PROCEDURE — 10907ZC DRAINAGE OF AMNIOTIC FLUID, THERAPEUTIC FROM PRODUCTS OF CONCEPTION, VIA NATURAL OR ARTIFICIAL OPENING: ICD-10-PCS | Performed by: OBSTETRICS & GYNECOLOGY

## 2022-11-30 RX ORDER — TRANEXAMIC ACID 10 MG/ML
1 INJECTION, SOLUTION INTRAVENOUS EVERY 30 MIN PRN
Status: DISCONTINUED | OUTPATIENT
Start: 2022-11-30 | End: 2022-11-30 | Stop reason: HOSPADM

## 2022-11-30 RX ORDER — OXYTOCIN 10 [USP'U]/ML
10 INJECTION, SOLUTION INTRAMUSCULAR; INTRAVENOUS
Status: DISCONTINUED | OUTPATIENT
Start: 2022-11-30 | End: 2022-11-30

## 2022-11-30 RX ORDER — MODIFIED LANOLIN
OINTMENT (GRAM) TOPICAL
Status: DISCONTINUED | OUTPATIENT
Start: 2022-11-30 | End: 2022-12-01 | Stop reason: HOSPADM

## 2022-11-30 RX ORDER — OXYTOCIN 10 [USP'U]/ML
INJECTION, SOLUTION INTRAMUSCULAR; INTRAVENOUS
Status: DISCONTINUED
Start: 2022-11-30 | End: 2022-11-30 | Stop reason: HOSPADM

## 2022-11-30 RX ORDER — KETOROLAC TROMETHAMINE 30 MG/ML
30 INJECTION, SOLUTION INTRAMUSCULAR; INTRAVENOUS
Status: DISCONTINUED | OUTPATIENT
Start: 2022-11-30 | End: 2022-11-30

## 2022-11-30 RX ORDER — SODIUM CHLORIDE, SODIUM LACTATE, POTASSIUM CHLORIDE, CALCIUM CHLORIDE 600; 310; 30; 20 MG/100ML; MG/100ML; MG/100ML; MG/100ML
INJECTION, SOLUTION INTRAVENOUS
Status: COMPLETED
Start: 2022-11-30 | End: 2022-11-30

## 2022-11-30 RX ORDER — OXYTOCIN/0.9 % SODIUM CHLORIDE 30/500 ML
340 PLASTIC BAG, INJECTION (ML) INTRAVENOUS CONTINUOUS PRN
Status: DISCONTINUED | OUTPATIENT
Start: 2022-11-30 | End: 2022-12-01 | Stop reason: HOSPADM

## 2022-11-30 RX ORDER — OXYTOCIN/0.9 % SODIUM CHLORIDE 30/500 ML
340 PLASTIC BAG, INJECTION (ML) INTRAVENOUS CONTINUOUS PRN
Status: DISCONTINUED | OUTPATIENT
Start: 2022-11-30 | End: 2022-11-30 | Stop reason: HOSPADM

## 2022-11-30 RX ORDER — HYDROCORTISONE 25 MG/G
CREAM TOPICAL 3 TIMES DAILY PRN
Status: DISCONTINUED | OUTPATIENT
Start: 2022-11-30 | End: 2022-12-01 | Stop reason: HOSPADM

## 2022-11-30 RX ORDER — SODIUM CHLORIDE, SODIUM LACTATE, POTASSIUM CHLORIDE, CALCIUM CHLORIDE 600; 310; 30; 20 MG/100ML; MG/100ML; MG/100ML; MG/100ML
INJECTION, SOLUTION INTRAVENOUS CONTINUOUS PRN
Status: DISCONTINUED | OUTPATIENT
Start: 2022-11-30 | End: 2022-11-30 | Stop reason: HOSPADM

## 2022-11-30 RX ORDER — LIDOCAINE HYDROCHLORIDE 10 MG/ML
INJECTION, SOLUTION EPIDURAL; INFILTRATION; INTRACAUDAL; PERINEURAL
Status: DISCONTINUED
Start: 2022-11-30 | End: 2022-11-30 | Stop reason: HOSPADM

## 2022-11-30 RX ORDER — MISOPROSTOL 200 UG/1
800 TABLET ORAL
Status: DISCONTINUED | OUTPATIENT
Start: 2022-11-30 | End: 2022-11-30 | Stop reason: HOSPADM

## 2022-11-30 RX ORDER — SERTRALINE HYDROCHLORIDE 25 MG/1
50 TABLET, FILM COATED ORAL EVERY MORNING
Status: DISCONTINUED | OUTPATIENT
Start: 2022-12-01 | End: 2022-12-01 | Stop reason: HOSPADM

## 2022-11-30 RX ORDER — MISOPROSTOL 200 UG/1
400 TABLET ORAL
Status: DISCONTINUED | OUTPATIENT
Start: 2022-11-30 | End: 2022-11-30 | Stop reason: HOSPADM

## 2022-11-30 RX ORDER — OXYTOCIN 10 [USP'U]/ML
10 INJECTION, SOLUTION INTRAMUSCULAR; INTRAVENOUS
Status: DISCONTINUED | OUTPATIENT
Start: 2022-11-30 | End: 2022-12-01 | Stop reason: HOSPADM

## 2022-11-30 RX ORDER — MISOPROSTOL 200 UG/1
400 TABLET ORAL
Status: DISCONTINUED | OUTPATIENT
Start: 2022-11-30 | End: 2022-12-01 | Stop reason: HOSPADM

## 2022-11-30 RX ORDER — FENTANYL CITRATE 50 UG/ML
100 INJECTION, SOLUTION INTRAMUSCULAR; INTRAVENOUS
Status: DISCONTINUED | OUTPATIENT
Start: 2022-11-30 | End: 2022-12-01 | Stop reason: HOSPADM

## 2022-11-30 RX ORDER — NALOXONE HYDROCHLORIDE 0.4 MG/ML
0.4 INJECTION, SOLUTION INTRAMUSCULAR; INTRAVENOUS; SUBCUTANEOUS
Status: DISCONTINUED | OUTPATIENT
Start: 2022-11-30 | End: 2022-11-30 | Stop reason: HOSPADM

## 2022-11-30 RX ORDER — ACETAMINOPHEN 325 MG/1
650 TABLET ORAL EVERY 4 HOURS PRN
Status: DISCONTINUED | OUTPATIENT
Start: 2022-11-30 | End: 2022-12-01 | Stop reason: HOSPADM

## 2022-11-30 RX ORDER — MISOPROSTOL 200 UG/1
800 TABLET ORAL
Status: DISCONTINUED | OUTPATIENT
Start: 2022-11-30 | End: 2022-12-01 | Stop reason: HOSPADM

## 2022-11-30 RX ORDER — PROCHLORPERAZINE 25 MG
25 SUPPOSITORY, RECTAL RECTAL EVERY 12 HOURS PRN
Status: DISCONTINUED | OUTPATIENT
Start: 2022-11-30 | End: 2022-11-30 | Stop reason: HOSPADM

## 2022-11-30 RX ORDER — OXYTOCIN 10 [USP'U]/ML
10 INJECTION, SOLUTION INTRAMUSCULAR; INTRAVENOUS
Status: DISCONTINUED | OUTPATIENT
Start: 2022-11-30 | End: 2022-11-30 | Stop reason: HOSPADM

## 2022-11-30 RX ORDER — ONDANSETRON 4 MG/1
4 TABLET, ORALLY DISINTEGRATING ORAL EVERY 6 HOURS PRN
Status: DISCONTINUED | OUTPATIENT
Start: 2022-11-30 | End: 2022-11-30 | Stop reason: HOSPADM

## 2022-11-30 RX ORDER — TRANEXAMIC ACID 10 MG/ML
1 INJECTION, SOLUTION INTRAVENOUS EVERY 30 MIN PRN
Status: DISCONTINUED | OUTPATIENT
Start: 2022-11-30 | End: 2022-12-01 | Stop reason: HOSPADM

## 2022-11-30 RX ORDER — METHYLERGONOVINE MALEATE 0.2 MG/ML
200 INJECTION INTRAVENOUS
Status: DISCONTINUED | OUTPATIENT
Start: 2022-11-30 | End: 2022-12-01 | Stop reason: HOSPADM

## 2022-11-30 RX ORDER — FENTANYL/ROPIVACAINE/NS/PF 2MCG/ML-.1
PLASTIC BAG, INJECTION (ML) EPIDURAL
Status: DISCONTINUED | OUTPATIENT
Start: 2022-11-30 | End: 2022-11-30 | Stop reason: HOSPADM

## 2022-11-30 RX ORDER — CARBOPROST TROMETHAMINE 250 UG/ML
250 INJECTION, SOLUTION INTRAMUSCULAR
Status: DISCONTINUED | OUTPATIENT
Start: 2022-11-30 | End: 2022-11-30 | Stop reason: HOSPADM

## 2022-11-30 RX ORDER — METOCLOPRAMIDE 10 MG/1
10 TABLET ORAL EVERY 6 HOURS PRN
Status: DISCONTINUED | OUTPATIENT
Start: 2022-11-30 | End: 2022-11-30 | Stop reason: HOSPADM

## 2022-11-30 RX ORDER — NALOXONE HYDROCHLORIDE 0.4 MG/ML
0.2 INJECTION, SOLUTION INTRAMUSCULAR; INTRAVENOUS; SUBCUTANEOUS
Status: DISCONTINUED | OUTPATIENT
Start: 2022-11-30 | End: 2022-11-30 | Stop reason: HOSPADM

## 2022-11-30 RX ORDER — PROCHLORPERAZINE MALEATE 10 MG
10 TABLET ORAL EVERY 6 HOURS PRN
Status: DISCONTINUED | OUTPATIENT
Start: 2022-11-30 | End: 2022-11-30 | Stop reason: HOSPADM

## 2022-11-30 RX ORDER — IBUPROFEN 800 MG/1
800 TABLET, FILM COATED ORAL EVERY 6 HOURS PRN
Status: DISCONTINUED | OUTPATIENT
Start: 2022-11-30 | End: 2022-12-01 | Stop reason: HOSPADM

## 2022-11-30 RX ORDER — IBUPROFEN 800 MG/1
800 TABLET, FILM COATED ORAL
Status: DISCONTINUED | OUTPATIENT
Start: 2022-11-30 | End: 2022-11-30

## 2022-11-30 RX ORDER — DOCUSATE SODIUM 100 MG/1
100 CAPSULE, LIQUID FILLED ORAL DAILY
Status: DISCONTINUED | OUTPATIENT
Start: 2022-11-30 | End: 2022-12-01 | Stop reason: HOSPADM

## 2022-11-30 RX ORDER — LIDOCAINE 40 MG/G
CREAM TOPICAL
Status: DISCONTINUED | OUTPATIENT
Start: 2022-11-30 | End: 2022-11-30 | Stop reason: HOSPADM

## 2022-11-30 RX ORDER — MISOPROSTOL 200 UG/1
TABLET ORAL
Status: DISCONTINUED
Start: 2022-11-30 | End: 2022-11-30 | Stop reason: HOSPADM

## 2022-11-30 RX ORDER — NALBUPHINE HYDROCHLORIDE 10 MG/ML
2.5-5 INJECTION, SOLUTION INTRAMUSCULAR; INTRAVENOUS; SUBCUTANEOUS EVERY 6 HOURS PRN
Status: DISCONTINUED | OUTPATIENT
Start: 2022-11-30 | End: 2022-11-30

## 2022-11-30 RX ORDER — ONDANSETRON 2 MG/ML
4 INJECTION INTRAMUSCULAR; INTRAVENOUS EVERY 6 HOURS PRN
Status: DISCONTINUED | OUTPATIENT
Start: 2022-11-30 | End: 2022-11-30 | Stop reason: HOSPADM

## 2022-11-30 RX ORDER — CITRIC ACID/SODIUM CITRATE 334-500MG
30 SOLUTION, ORAL ORAL
Status: DISCONTINUED | OUTPATIENT
Start: 2022-11-30 | End: 2022-11-30 | Stop reason: HOSPADM

## 2022-11-30 RX ORDER — CARBOPROST TROMETHAMINE 250 UG/ML
250 INJECTION, SOLUTION INTRAMUSCULAR
Status: DISCONTINUED | OUTPATIENT
Start: 2022-11-30 | End: 2022-12-01 | Stop reason: HOSPADM

## 2022-11-30 RX ORDER — METHYLERGONOVINE MALEATE 0.2 MG/ML
200 INJECTION INTRAVENOUS
Status: DISCONTINUED | OUTPATIENT
Start: 2022-11-30 | End: 2022-11-30 | Stop reason: HOSPADM

## 2022-11-30 RX ORDER — BISACODYL 10 MG
10 SUPPOSITORY, RECTAL RECTAL DAILY PRN
Status: DISCONTINUED | OUTPATIENT
Start: 2022-11-30 | End: 2022-12-01 | Stop reason: HOSPADM

## 2022-11-30 RX ORDER — METOCLOPRAMIDE HYDROCHLORIDE 5 MG/ML
10 INJECTION INTRAMUSCULAR; INTRAVENOUS EVERY 6 HOURS PRN
Status: DISCONTINUED | OUTPATIENT
Start: 2022-11-30 | End: 2022-11-30 | Stop reason: HOSPADM

## 2022-11-30 RX ORDER — FENTANYL CITRATE-0.9 % NACL/PF 10 MCG/ML
100 PLASTIC BAG, INJECTION (ML) INTRAVENOUS EVERY 5 MIN PRN
Status: DISCONTINUED | OUTPATIENT
Start: 2022-11-30 | End: 2022-11-30 | Stop reason: HOSPADM

## 2022-11-30 RX ORDER — OXYTOCIN/0.9 % SODIUM CHLORIDE 30/500 ML
100-340 PLASTIC BAG, INJECTION (ML) INTRAVENOUS CONTINUOUS PRN
Status: DISCONTINUED | OUTPATIENT
Start: 2022-11-30 | End: 2022-11-30

## 2022-11-30 RX ORDER — OXYTOCIN/0.9 % SODIUM CHLORIDE 30/500 ML
1-24 PLASTIC BAG, INJECTION (ML) INTRAVENOUS CONTINUOUS
Status: DISCONTINUED | OUTPATIENT
Start: 2022-11-30 | End: 2022-11-30 | Stop reason: HOSPADM

## 2022-11-30 RX ORDER — OXYTOCIN/0.9 % SODIUM CHLORIDE 30/500 ML
PLASTIC BAG, INJECTION (ML) INTRAVENOUS
Status: DISCONTINUED
Start: 2022-11-30 | End: 2022-11-30 | Stop reason: HOSPADM

## 2022-11-30 RX ADMIN — IBUPROFEN 800 MG: 800 TABLET ORAL at 17:45

## 2022-11-30 RX ADMIN — Medication: at 05:52

## 2022-11-30 RX ADMIN — ACETAMINOPHEN 650 MG: 325 TABLET, FILM COATED ORAL at 17:45

## 2022-11-30 RX ADMIN — HUMAN RHO(D) IMMUNE GLOBULIN 300 MCG: 1500 SOLUTION INTRAMUSCULAR; INTRAVENOUS at 19:22

## 2022-11-30 RX ADMIN — LIDOCAINE HYDROCHLORIDE 10 ML: 10 INJECTION, SOLUTION EPIDURAL; INFILTRATION; INTRACAUDAL; PERINEURAL at 10:16

## 2022-11-30 RX ADMIN — Medication 2 MILLI-UNITS/MIN: at 08:02

## 2022-11-30 RX ADMIN — SODIUM CHLORIDE, POTASSIUM CHLORIDE, SODIUM LACTATE AND CALCIUM CHLORIDE 1000 ML: 600; 310; 30; 20 INJECTION, SOLUTION INTRAVENOUS at 04:00

## 2022-11-30 RX ADMIN — FENTANYL CITRATE 100 MCG: 50 INJECTION INTRAMUSCULAR; INTRAVENOUS at 04:01

## 2022-11-30 RX ADMIN — ACETAMINOPHEN 650 MG: 325 TABLET, FILM COATED ORAL at 23:43

## 2022-11-30 RX ADMIN — Medication 8 ML: at 05:15

## 2022-11-30 RX ADMIN — KETOROLAC TROMETHAMINE 30 MG: 30 INJECTION, SOLUTION INTRAMUSCULAR at 10:51

## 2022-11-30 ASSESSMENT — ACTIVITIES OF DAILY LIVING (ADL)
ADLS_ACUITY_SCORE: 19

## 2022-11-30 NOTE — PLAN OF CARE
Data: Indigo Lamb transferred to [id number] via wheelchair at 1230. Baby transferred via parent's arms.  Action: Receiving unit notified of transfer: Yes. Patient and family notified of room change. Report given to Shasta MOULTON RN at 1145. Belongings sent to receiving unit. Accompanied by Registered Nurse. Oriented patient to surroundings. Call light within reach. ID bands double-checked with receiving RN.  Response: Patient tolerated transfer and is stable.

## 2022-11-30 NOTE — PROGRESS NOTES
[facility]  Labor Progress Note    S:   Patient reports feeling some pressure with contractions.     O:   Patient Vitals for the past 4 hrs:   BP Temp Temp src Resp SpO2   22 0800 103/52 98  F (36.7  C) Oral -- 99 %   22 0700 96/52 -- -- -- 97 %   22 0638 109/57 -- -- 18 99 %   22 0606 110/66 -- -- -- 97 %   22 0605 -- -- -- -- 97 %   22 0600 113/70 -- -- -- 98 %   22 0555 112/65 -- -- -- 98 %   22 0550 117/70 -- -- -- 98 %   22 0545 119/60 -- -- -- 98 %   22 0540 118/70 -- -- -- 99 %   22 0535 131/59 -- -- -- 100 %   22 0530 117/69 98.9  F (37.2  C) Oral -- 100 %       SVE: /-2  Membranes: AROM w/ clear fluid    FHT  Baseline: 130  Variability: Moderate  Accels: Present  Decels: Occasional variable  [place]: Q2min    A/P:  Indigo Lamb is a 32 year old  at 39w6d by LMP c/w 10w1d US, admitted for spontaneous labor.    # Labor  - Pain: Epidural working well  - GBS neg  - S/p AROM  - Continue to titrate pitocin per protocol    #Spinal stenosis, s/p microdiscectomy, hemilaminectomy of L4-5  - Okay for epidural per anesthesia    #Fetal Well Being  - Overall Category I FHT  - Continuous EFM  - Interventions PRN  - EFW 7#  - Cephalic by BSUS    Anticipate     Vic Hester MD MPH  Ob/Gyn Resident, PGY-2  2022 9:26 AM

## 2022-11-30 NOTE — PLAN OF CARE
Goal Outcome Evaluation:  Pitocin started for irregular contraction pattern. Pitocin started per orders. Discussed starting pitocin with patient, patient given time to ask questions, patient agreed with plan. Will closely monitor uterine contractions and FHT's. Plan to increase Pitocin as indicated per protocol. Anticipate . Notify provider of progressing labor or maternal/fetal distress.

## 2022-11-30 NOTE — L&D DELIVERY NOTE
OB Vaginal Delivery Note    Indigo Lamb MRN# [id number]   Age: 32 year old YOB: 1990     L&D Delivery Note:     Indigo Lamb is a 32 year old now  who presented at 39w6d for spontaneous labor.  She was augmented with pitocin and AROM.  Labor course was uncomplicated.  Epidural was administered for pain control. She progressed to complete, pushed with good maternal effort, and had a spontaneous vaginal delivery of a viable female infant in OA position.  Tight double nuchal cord was noted and reduced after delivery.  Apgars of 8 and 9 with weight pending.  The cord was double clamped after 60 seconds and cut.  A cord segment and cord blood were obtained.  30U of IV pitocin was started. The placenta was then delivered using gentle traction and suprapubic pressure.  The uterus was noted to be firm after fundal massage.  The perineum was assessed for lacerations revealing a second degree tear which was repaired in standard fashion using 3-0 Vicryl suture. There was additionally a right sided labial tear just lateral to the clitoris and urethra. This was repaired with 4-0 Vicryl in a running fashion. On final examination of the perineum, the repair was noted to be hemostatic.  Total EBL was 150 mL with QBL pending.  The placenta appeared intact with a 3V umbilical cord.  Dr. Sanches was present for the entire procedure.     Brittany Yip MD  OB/GYN PGY-2  22 10:49 AM    GA: 39w6d  GP:   Labor Complications: None   EBL:   mL  Delivery QBL:    Delivery Type: Vaginal, Spontaneous   ROM to Delivery Time: (Delivered) Minutes: 51   Weight:     1 Minute 5 Minute 10 Minute   Apgar Totals: 8   9        BRITTANY YIP;CLEMENTINA LAMB     Delivery Details:  Indigo Lamb, a 32 year old  female delivered a viable infant with apgars of 8  and 9 . Patient was fully dilated and pushing after   hours   minutes in active labor. Delivery was via vaginal, spontaneous  to a  sterile field under epidural  anesthesia. Infant delivered in vertex    occiput  anterior  position. Anterior and posterior shoulders delivered without difficulty. The cord was clamped, cut twice and 3 vessels  were noted. Cord blood was obtained in routine fashion with the following disposition: lab .      Cord complications: nuchal   Placenta delivered at 2022 10:18 AM . Placental disposition was Hospital disposal . Fundal massage performed and fundus found to be firm.     Episiotomy: none    Perineum, vagina, cervix were inspected, and the following lacerations were noted:   Perineal lacerations: 2nd     labial laceration: right           Any lacerations were repaired in the usual fashion as above.    Excellent hemostasis was noted. Needle count correct. Infant and patient in delivery room in good and stable condition.        Zainab Willayssa [[id number]]      Labor Event Times      Labor onset date: 22 Onset time: 11:30 AM   Dilation complete date: 22 Complete time:  9:55 AM   Start pushing date/time: 2022 0959          Labor Events     labor?: No   steroids: Unknown  Labor Type: Spontaneous, Augmentation  Predominate monitoring during 1st stage: continuous electronic fetal monitoring     Antibiotics received during labor?: No       Rupture date/time: 22 0920   Rupture type: Artificial Rupture of Membranes  Fluid color: Clear     Augmentation: Oxytocin, AROM  Augmentation date/time: 22 0802     1:1 continuous labor support provided by?: RN           Delivery/Placenta Date and Time      Delivery Date: 22 Delivery Time: 10:11 AM   Placenta Date/Time: 2022 10:18 AM  Oxytocin given at the time of delivery: after delivery of baby  Delivering clinician: Amy Sanches MD   Other personnel present at delivery:  Provider Role   Vic Hester MD Cunningham, Michelle, ZORAIDA              Vaginal Counts       Initial count performed by 2 team  members:  Two Team Members   MARTA Sanches         Needles Suture Needles Sponges (RETIRED) Instruments   Initial counts 2  5    Added to count       Relief counts       Final counts               Placed during labor Accounted for at the end of labor   FSE No    IUPC No    Cervidil No                              Apgars    Living status: Living   1 Minute 5 Minute 10 Minute 15 Minute 20 Minute   Skin color: 0  1       Heart rate: 2  2       Reflex irritability: 2  2       Muscle tone: 2  2       Respiratory effort: 2  2       Total: 8  9       Apgars assigned by: ALBERT REYES RN       Cord      Vessels: 3 Vessels    Cord Complications: Nuchal   Nuchal Intervention: delivered through         Nuchal cord description: tight nuchal cord         Cord Blood Disposition: Lab    Gases Sent?: No    Delayed cord clamping?: Yes    Cord Clamping Delay (seconds):  seconds    Stem cell collection?: No           [place] Resuscitation    Methods: None   Care at Delivery: Stimulated to cry. Slow to pink up. Good HR and respirations. Skin to skin with mother.       Labor Events and Shoulder Dystocia    Fetal Tracing Prior to Delivery: Category 1  Shoulder dystocia present?: Neg       Delivery (Maternal) (Provider to Complete) (778569)    Episiotomy: None  Perineal lacerations: 2nd Repaired?: Yes     Labial laceration: right Repaired?: Yes   Repair suture: 4-0 Vicryl, 3-0 Vicryl  Genital tract inspection done: Pos       Blood Loss  Mother: Indigo Lamb #[id number]     Start of Mother's Information      Delivery Blood Loss  22 1130 - 22 1058      None                 End of Mother's Information  Mother: Indigo Lamb #[id number]                Delivery - Provider to Complete (206050)    Delivering clinician: Amy Sanches MD  Delivery Type (Choose the 1 that will go to the Birth History): Vaginal, Spontaneous                         Other personnel:  Provider Role   Vic Hester MD     Alva Montes, ZORAIDA                     Placenta    Date/Time: 11/30/2022 10:18 AM  Removal: Expressed  Disposition: Hospital disposal             Anesthesia    Method: Epidural  Cervical dilation at placement: 4-7                    Presentation and Position    Presentation: Vertex     Occiput Anterior                     Vic Hester MD    Staff MD Note  I was present and scrubbed for the entire procedure noted above.  I agree with the description above and any necessary changes have been made by me.  Amy Sanches MD

## 2022-11-30 NOTE — DISCHARGE SUMMARY
VAGINAL DELIVERY DISCHARGE SUMMARY    Indigo Lamb  : 1990  MRN: [id number]    Admit date: 2022  Discharge date: 22      Admit Dx:   - 32 year old  at 39w6d   - Spontaneous labor  - Severe spinal stenosis i/s/o lumbar disc herniation s/p right hemilaminectomy and microdiscectomy at lumbar 4-5  - Rh negative   - COVID in pregnancy    Discharge Dx:  - Same as above, s/p     Procedures:  - Spontaneous vaginal delivery  - Epidural analgesia    Admit HPI:  Ms. Indigo Lamb is a  at 39w6d who presented in spontaneous labor on 2022. Please see her admit H&P for full details of her PMH, PSH, Meds, Allergies and exam on admit.    Consults:  Anesthesia  Lactation    Hospital course:  Indigo Lamb was admitted to the hospital on 2022 for the above listed indications.    Indigo Lamb is a 32 year old now  who presented at 39w6d for spontaneous labor.  She was augmented with pitocin and AROM.  Labor course was uncomplicated.  Epidural was administered for pain control. She progressed to complete, pushed with good maternal effort, and had a spontaneous vaginal delivery of a viable female infant in OA position.  Tight double nuchal cord was noted and reduced after delivery.  Apgars of 8 and 9 with weight pending.  The cord was double clamped after 60 seconds and cut.  A cord segment and cord blood were obtained.  30U of IV pitocin was started. The placenta was then delivered using gentle traction and suprapubic pressure.  The uterus was noted to be firm after fundal massage.  The perineum was assessed for lacerations revealing a second degree tear which was repaired in standard fashion using 3-0 Vicryl suture. There was additionally a right sided labial tear just lateral to the clitoris and urethra. This was repaired with 4-0 Vicryl in a running fashion. On final examination of the perineum, the repair was noted to be hemostatic.   mL.  The  placenta appeared intact with a 3V umbilical cord.  Dr. Sanches was present for the entire procedure.     Please see her Delivery Summary for full details regarding her delivery.    Her postpartum course was uncomplicated. On PPD#1, she was meeting all of her postpartum goals and deemed stable for discharge. She was voiding without difficulty, tolerating a regular diet without nausea and vomiting, her pain was well controlled on oral pain medicines and her lochia was appropriate. Her hemoglobin prior to delivery was 12.8 and was 11.6 on PPD#1. Her Rh status was negative, and Rhogam was given PTD. She was rubella immune and a vaccine was therefore not indicated.    HGB  Recent Labs   Lab 22  0726 22  0306   HGB 11.6* 12.8       Discharge Medications:     Review of your medicines      START taking      Dose / Directions   ibuprofen 600 MG tablet  Commonly known as: ADVIL/MOTRIN  Used for:  (spontaneous vaginal delivery)      Dose: 600 mg  Take 1 tablet (600 mg) by mouth every 6 hours as needed for moderate pain (4-6) Start after delivery  Quantity: 60 tablet  Refills: 0     senna-docusate 8.6-50 MG tablet  Commonly known as: SENOKOT-S/PERICOLACE  Used for:  (spontaneous vaginal delivery)      Dose: 1 tablet  Take 1 tablet by mouth daily Start after delivery.  Quantity: 100 tablet  Refills: 0        CONTINUE these medicines which may have CHANGED, or have new prescriptions. If we are uncertain of the size of tablets/capsules you have at home, strength may be listed as something that might have changed.      Dose / Directions   * acetaminophen 325 MG tablet  Commonly known as: TYLENOL  This may have changed: Another medication with the same name was added. Make sure you understand how and when to take each.  Used for: Lumbar disc herniation      Dose: 975 mg  Take 3 tablets (975 mg) by mouth every 8 hours  Quantity: 45 tablet  Refills: 0     * acetaminophen 325 MG tablet  Commonly known as: TYLENOL  This  may have changed: You were already taking a medication with the same name, and this prescription was added. Make sure you understand how and when to take each.  Used for:  (spontaneous vaginal delivery)      Dose: 650 mg  Take 2 tablets (650 mg) by mouth every 6 hours as needed for mild pain Start after Delivery.  Quantity: 100 tablet  Refills: 0     sertraline 50 MG tablet  Commonly known as: ZOLOFT  This may have changed: when to take this  Used for: Current mild episode of major depressive disorder without prior episode (H)      Dose: 50 mg  Take 1 tablet (50 mg) by mouth daily  Quantity: 90 tablet  Refills: 3         * This list has 2 medication(s) that are the same as other medications prescribed for you. Read the directions carefully, and ask your doctor or other care provider to review them with you.            CONTINUE these medicines which have NOT CHANGED      Dose / Directions   breast pump Misc  Used for: Encounter for supervision of other normal pregnancy in third trimester      Dose: 1 each  1 each as needed  Quantity: 1 each  Refills: 0     cyclobenzaprine 10 MG tablet  Commonly known as: FLEXERIL  Used for: Lumbar disc herniation      Dose: 10 mg  Take 1 tablet (10 mg) by mouth 3 times daily as needed for muscle spasms  Quantity: 30 tablet  Refills: 1     PRENATAL PO      Dose: 1 tablet  Take 1 tablet by mouth every morning  Refills: 0           Where to get your medicines      These medications were sent to [facility] [address]  [address] [address]    Phone: [phone]     acetaminophen 325 MG tablet    ibuprofen 600 MG tablet    senna-docusate 8.6-50 MG tablet         Discharge/Disposition:  Indigo Lamb was discharged to home in stable condition with the following instructions/medications:  1) Call for temperature > 100.4, bright red vaginal bleeding >1 pad an hour x 2 hours, foul smelling vaginal discharge, pain not  controlled by usual oral pain meds, persistent nausea and vomiting not controlled on medications  2) She declined contraception.  3) For feeding she decided to breast feed.  4) She was instructed to follow-up with her primary OB in 2 and 6 weeks for a routine postpartum visit.    Vic Hester MD, MPH  Ob/Gyn Resident, PGY-2    I have seen, examined, and counseled the patient on the day of discharge. I have reviewed and edited the summary.  Carrie Thompson

## 2022-11-30 NOTE — PLAN OF CARE
Data: Patient presented to [facility] at 0003.   Reason for maternal/fetal assessment per patient is Rule Out Labor  .  Patient is a . Prenatal record reviewed.      OB History    Para Term  AB Living   2 1 1 0 0 1   SAB IAB Ectopic Multiple Live Births   0 0 0 0 1      # Outcome Date GA Lbr Shalom/2nd Weight Sex Delivery Anes PTL Lv   2 Current            1 Term 20 41w0d 12:57 / 02:16 3.32 kg (7 lb 5.1 oz) M Vag-Spont EPI N LINSEY      Name: Clarence      Apgar1: 8  Apgar5: 9   . Medical history:   Past Medical History:   Diagnosis Date     Lumbar disc herniation      Post partum depression    . Gestational Age 39w6d. VSS. Fetal movement present. Patient denies cramping, backache, vaginal discharge, pelvic pressure, UTI symptoms, GI problems, bloody show, vaginal bleeding, edema, headache, visual disturbances, epigastric or URQ pain, abdominal pain, rupture of membranes. Support persons Mello (spouse) present.  Action: Verbal consent for EFM. Triage assessment completed. EFM applied for 1 hour. Uterine assessment is soft, non-tender, hilario regularly. Fetal assessment: Presumed adequate fetal oxygenation documented (see flow record).   Response: Dr. Mejia informed of pt arrival. Assessed pt. Cervix was checked and observed pt for 1.5 hours before checking again. Plan per provider is to admit pt for labor. Patient verbalized agreement with plan. Patient transferred to room 481 ambulatory, oriented to room and call light.

## 2022-11-30 NOTE — PROGRESS NOTES
[facility]  Labor Progress Note    S:   Patient reports feeling more comfortable with epidural    O:   Patient Vitals for the past 4 hrs:   BP Temp Temp src Resp SpO2   2206 110/66 -- -- -- 97 %   22 0605 -- -- -- -- 97 %   22 0600 113/70 -- -- -- 98 %   22 0555 112/65 -- -- -- 98 %   22 0550 117/70 -- -- -- 98 %   22 0545 119/60 -- -- -- 98 %   22 0540 118/70 -- -- -- 99 %   22 0535 131/59 -- -- -- 100 %   22 0530 117/69 98.9  F (37.2  C) Oral -- 100 %   22 0525 119/65 -- -- -- --   22 0523 127/62 -- -- -- 100 %   22 0521 116/66 -- -- -- --   22 0519 115/67 -- -- -- --   22 0518 110/73 -- -- -- 100 %   22 0516 110/63 -- -- -- --   22 0513 122/65 -- -- -- 100 %   22 0512 118/74 -- -- -- --   22 0508 -- -- -- -- 100 %   22 0503 -- -- -- -- 100 %   22 0458 -- -- -- -- 98 %   22 0240 114/79 97.9  F (36.6  C) Oral 16 --       SVE: /-2  Membranes: intact    FHT  Baseline: 130  Variability: Moderate  Accels: Present  Decels: Absent  [place]: 2-3 in 10 minutes    A/P:  Indigo Lamb is a 32 year old  at 39w6d by LMP c/w 10w1d US, here for early labor.    #Early Labor  - Admit to labor and delivery for expectant management  - SVE: /-2 > 4--/-2>/2  - Pain: Planning epidural  - GBS neg;  - Plan: Start pitocin augmentation    #Spinal stenosis, s/p microdiscectomy, hemilaminectomy of L4-5  - Okay for epidural per anesthesia    #Fetal Well Being  - Category I FHT  - Continuous EFM  - Interventions PRN  - EFW 7#  - Cephalic by BSUS    Addie Mejia MD  Ob/Gyn Resident, PGY-2  2022 6:38 AM

## 2022-11-30 NOTE — ANESTHESIA PROCEDURE NOTES
"Dural puncture epidural Procedure Note    Pre-Procedure   Staff -        Anesthesiologist:  Kimberly Tamez MD       Performed By: anesthesiologist       Location: OB       Pre-Anesthestic Checklist: patient identified, IV checked, risks and benefits discussed, informed consent, monitors and equipment checked, pre-op evaluation, at physician/surgeon's request and post-op pain management  Timeout:       Correct Patient: Yes        Correct Procedure: Yes        Correct Site: Yes        Correct Position: Yes   Procedure Documentation  Procedure: dural puncture epidural       Patient Position: sitting       Patient Prep/Sterile Barriers: sterile gloves, mask, patient draped       Skin prep: Chloraprep       Local skin infiltrated with 3 mL of 1% lidocaine.        Insertion Site: L3-4. (midline approach).       Technique: LORT saline        JOVI at 5.5 cm.       Needle Gauge: 17.        Needle Length (Inches): 3.5        Spinal Needle Type: Pencan       Spinal Needle (gauge): 25        Spinal Needle Length (inches): 4.69       Catheter: 19 G.          Catheter threaded easily.         5 cm epidural space.         Threaded 10.5 cm at skin.         # of attempts: 1 and  # of redirects:  0    Assessment/Narrative         Paresthesias: No.       Test dose of 3 mL at 05:10 CST.         Test dose negative, 3 minutes after injection, for signs of intravascular, subdural, or intrathecal injection.       Insertion/Infusion Method: LORT saline       Aspiration negative for Heme or CSF via Epidural Catheter.       Sensory Level Left: T10.       Sensory Level Right: T10.       CSF fluid: with Spinal needle.CSF fluid removed: with Epidural needle - not with Epidural needle.      FOR [facility] ([facility]) ONLY:   Pain Team Contact information: please page the Pain Team Via [employer]. Search \"Pain\". During daytime hours, please page the attending first. At night please page the resident first.    "

## 2022-11-30 NOTE — H&P
L&D History and Physical   2022  Indigo Lamb  [id number]      HPI:   Indigo Lamb is a 32 year old  at 39w6d by LMP c/w 10w1d US here with complaints of contractions. Pregnancy complicated as below.    She states that she has been feeling contractions every 5-6 mins with bloody discharge that started around 8 pm yesterday after cervical exam and membrane stripping in clinic. Denies LOF. Good FM. She denies F/C, HA, vision changes, loss of taste/smell, cough, sore throat, CP, SOB, RUQ pain, N/V, dysuria, constipation, diarrhea, increased edema.    Her pregnancy has been complicated by:  - Severe spinal stenosis i/s/o lumbar disc herniation s/p right hemilaminectomy and microdiscectomy at lumbar 4-5 w/ Dr. Murray on 22  - Rh neg  - COVID in pregnancy    OBHX:   OB History    Para Term  AB Living   2 1 1 0 0 1   SAB IAB Ectopic Multiple Live Births   0 0 0 0 1      # Outcome Date GA Lbr Shlaom/2nd Weight Sex Delivery Anes PTL Lv   2 Current            1 Term 20 41w0d 12:57 / 02:16 3.32 kg (7 lb 5.1 oz) M Vag-Spont EPI N LINSEY      Name: Clarence      Apgar1: 8  Apgar5: 9       Past Medical History:   Diagnosis Date     Lumbar disc herniation      Post partum depression        Past Surgical History:   Procedure Laterality Date     HC TOOTH EXTRACTION W/FORCEP      wisdom teeth     HEMILAMINECTOMY, DISCECTOMY LUMBAR ONE LEVEL, COMBINED Right 2022    Procedure: RIGHT HEMILAMINECTOMY,  WITH MICRODISCECTOMY L4-5;  Surgeon: Jenn Murray MD;  Location: [facility]     SURGICAL HISTORY OF -       Bone-patellar-bone left knee anterior cruciate ligament reconstruction       Medications:   No current facility-administered medications on file prior to encounter.  acetaminophen (TYLENOL) 325 MG tablet, Take 3 tablets (975 mg) by mouth every 8 hours  cyclobenzaprine (FLEXERIL) 10 MG tablet, Take 1 tablet (10 mg) by mouth 3 times daily as needed for muscle  spasms  Prenatal Vit-Fe Fumarate-FA (PRENATAL PO), Take 1 tablet by mouth every morning  sertraline (ZOLOFT) 50 MG tablet, Take 1 tablet (50 mg) by mouth daily (Patient taking differently: Take 50 mg by mouth every morning)  Misc. Devices (BREAST PUMP) MISC, 1 each as needed (Patient not taking: Reported on 10/19/2022)        Allergies   Allergen Reactions     Pcn [Penicillins] Hives and Rash     Blistering on face, all over     Zithromax [Azithromycin Dihydrate] Rash       Family History   Problem Relation Age of Onset     Anxiety Disorder Mother      Obesity Father      Allergies Brother      Hypertension Maternal Grandmother      Breast Cancer Maternal Grandmother      Heart Failure Maternal Grandfather      Alcoholism Maternal Grandfather      Other - See Comments Paternal Grandmother         father adopted     C.A.D. No family hx of      Diabetes No family hx of      Cerebrovascular Disease No family hx of      Cancer - colorectal No family hx of        SocialHX:   Social History     Tobacco Use     Smoking status: Never     Smokeless tobacco: Never     Tobacco comments:     parents smoke   Vaping Use     Vaping Use: Never used   Substance Use Topics     Alcohol use: Not Currently     Comment: rare-quit with pregnancy     Drug use: No       ROS: 10-point ROS negative except as indicated in HPI.    Physical Exam:  Vitals:    11/30/22 0545 11/30/22 0550 11/30/22 0555 11/30/22 0600   BP: 119/60 117/70 112/65 113/70   BP Location:       Patient Position:       Cuff Size:       Resp:       Temp:       TempSrc:       SpO2: 98% 98% 98% 98%     General: alert, oriented female, resting in bed in NAD  CV: regular rate and rhythm  Lungs: clear bilaterally, no crackles or wheezes.  Abdomen: soft, gravid, non-tender, EFW 8#.  Extremities: bilateral lower extremities non-tender with trace edema    SVE: 4/80/-2  Membranes: Intact    Presentation: Cephalic by BSUS    FHT  Baseline: 130  Variability: Moderate  Accels:  Present  Decels: Absent  Mullen:  in 10 minutes    Prenatal ultrasounds:  Growth US (11/10) EFW 32%ile    Prenatal Labs:   Lab Results   Component Value Date    ABO A 12/05/2020    RH Neg 12/05/2020    AS Negative 11/30/2022    HEPBANG Nonreactive 05/06/2022    CHPCRT Negative 04/20/2020    GCPCRT Negative 04/20/2020    HGB 12.8 11/30/2022       GBS Status:   Lab Results   Component Value Date    GBS Negative 10/29/2020       Lab Results   Component Value Date    PAP NIL 01/15/2021       Labs:   Results for orders placed or performed during the hospital encounter of 11/30/22 (from the past 24 hour(s))   ABO/Rh type and screen    Narrative    The following orders were created for panel order ABO/Rh type and screen.  Procedure                               Abnormality         Status                     ---------                               -----------         ------                     Adult Type and Screen[[id number]]                            Final result                 Please view results for these tests on the individual orders.   Platelet count   Result Value Ref Range    Platelet Count 205 150 - 450 10e3/uL   Hemoglobin   Result Value Ref Range    Hemoglobin 12.8 11.7 - 15.7 g/dL   Adult Type and Screen   Result Value Ref Range    ABO/RH(D) A NEG     Antibody Screen Negative Negative    SPECIMEN EXPIRATION DATE [id number]    Extra Tube    Narrative    The following orders were created for panel order Extra Tube.  Procedure                               Abnormality         Status                     ---------                               -----------         ------                     Extra Green Top (Lithium...[[id number]]                      Final result                 Please view results for these tests on the individual orders.   Extra Green Top (Lithium Heparin) Tube   Result Value Ref Range    Hold Specimen [facility]    Asymptomatic COVID-19 Virus (Coronavirus) by PCR Nose    Specimen: Nose; Swab   Result  Value Ref Range    SARS CoV2 PCR Negative Negative    Narrative    Testing was performed using the Xpert Xpress SARS-CoV-2 Assay on the Cepheid Gene-Xpert Instrument Systems. Additional information about this Emergency Use Authorization (EUA) assay can be found via the Lab Guide. This test should be ordered for the detection of SARS-CoV-2 in individuals who meet SARS-CoV-2 clinical and/or epidemiological criteria as well as from individuals without symptoms or other reasons to suspect COVID-19. Test performance for asymptomatic patients has only been established in anterior nasal swab specimens. This test is for in vitro diagnostic use under the FDA EUA for laboratories certified under CLIA to perform high complexity testing. This test has not been FDA cleared or approved. A negative result does not rule out the presence of PCR inhibitors in the specimen or target RNA concentration below the limit of detection for the assay. The possibility of a false negative should be considered if the patient's recent exposure or clinical presentation suggests COVID-19. This test was validated by the [facility]. This laboratory is certified under the Clinical Laboratory Improvement Amendments (CLIA) as qualified to perform high complexity laboratory testing.         A/P:   32 year old  at 39w6d by LMP c/w 10w1d US who presents for early labor. Pregnancy complicated by disc herniation s/p hemilaminectomy and microdiscectomy at L4-5 for which patient was initially advised to deliver at [facility] for access to anesthesia familiar with her surgery as she is planning to have an epidural. Admit for expectant management.    #Early Labor  - Admit to labor and delivery for expectant management  - SVE: 80/-2 > 4-/-90/-2  - Pain: Planning epidural  - GBS neg  - Plan: Continue expectant management, possible augmentation with pitocin    #Fetal Well Being  - Category I FHT  - Continuous EFM  -  Interventions PRN  - EFW 8#    #Spinal stenosis, s/p surgery  - Okay for epidural per anesthesia    #PNC:     - BMI 34  - Rh neg, Rubella immune, GCT wnl  - Hep B Antigen neg  - GBS neg  - Other infectious labs neg    Patient seen and care plan discussed under supervision of Dr. Magdaleno.    Addie Mejia MD  Ob/Gyn Resident, PGY-2  11/30/2022 6:06 AM     I personally examined and evaluated Indigo Lamb on 11/30/2022.  I discussed the patient with Dr. Mejia and agree with the presentation, exam and plan of care documented in this note with edits by me.   Maty Magdaleno MD MPH

## 2022-11-30 NOTE — PLAN OF CARE
Goal Outcome Evaluation:      of viable Female with Dr Sanches in attendance. Nursery RN Anna MONTOYA present. Infant with spontaneous cry to mothers abdomen, dried and stimulated. Apgars 8/9. Placenta delivered without complications, pitocin bolused,  laceration,  repairs done. Sarah cares provided. Mother and baby in stable condition.

## 2022-11-30 NOTE — PROGRESS NOTES
Patient arrived to [facility] unit via wheelchair at 1230,with belongings, accompanied by spouse/ significant other, with infant in arms. Received report from Alva MÁRQUEZ RN and checked bands. Unit and room orientation started. Call light given; no concerns present at this time. Continue with plan of care.

## 2022-11-30 NOTE — PROVIDER NOTIFICATION
11/30/22 0952   Provider Notification   Provider Name/Title Dr Hester   Method of Notification Electronic Page   Pt reporting alot of pressure during ctx, is uncomfortable. Requesting a SVE.

## 2022-11-30 NOTE — ANESTHESIA PREPROCEDURE EVALUATION
Anesthesia Pre-Procedure Evaluation    Patient: Indigo Lamb   MRN: [id number] : 1990        Procedure : Procedure(s):  OB anesthesia consult           Past Medical History:   Diagnosis Date     Lumbar disc herniation      Post partum depression       Past Surgical History:   Procedure Laterality Date     HC TOOTH EXTRACTION W/FORCEP      wisdom teeth     HEMILAMINECTOMY, DISCECTOMY LUMBAR ONE LEVEL, COMBINED Right 2022    Procedure: RIGHT HEMILAMINECTOMY,  WITH MICRODISCECTOMY L4-5;  Surgeon: Jenn Murray MD;  Location: [facility]     SURGICAL HISTORY OF -       Bone-patellar-bone left knee anterior cruciate ligament reconstruction      Allergies   Allergen Reactions     Pcn [Penicillins] Hives and Rash     Blistering on face, all over     Zithromax [Azithromycin Dihydrate] Rash      Social History     Tobacco Use     Smoking status: Never     Smokeless tobacco: Never     Tobacco comments:     parents smoke   Substance Use Topics     Alcohol use: Not Currently     Comment: rare-quit with pregnancy      Wt Readings from Last 1 Encounters:   22 93.4 kg (206 lb)        Anesthesia Evaluation   Pt has had prior anesthetic. Type: OB Labor Epidural and General.    No history of anesthetic complications       ROS/MED HX  ENT/Pulmonary:  - neg pulmonary ROS     Neurologic:       Cardiovascular:  - neg cardiovascular ROS     METS/Exercise Tolerance: >4 METS    Hematologic:  - neg hematologic  ROS     Musculoskeletal:  - neg musculoskeletal ROS     GI/Hepatic:  - neg GI/hepatic ROS     Renal/Genitourinary:  - neg Renal ROS     Endo:  - neg endo ROS     Psychiatric/Substance Use:  - neg psychiatric ROS     Infectious Disease:  - neg infectious disease ROS     Malignancy:  - neg malignancy ROS     Other:      (+) Possibly pregnant, ,         Physical Exam    Airway        Mallampati: IV   TM distance: > 3 FB   Neck ROM: full   Mouth opening: > 3 cm    Respiratory Devices and Support          Dental       (+) chipped      Cardiovascular   cardiovascular exam normal          Pulmonary   pulmonary exam normal            Other findings: Numbness of the lateral part of the right calf, decreased sensation to touch. Weakness with plantar flexion of her right foot    OUTSIDE LABS:  CBC:   Lab Results   Component Value Date    WBC 8.5 08/15/2022    WBC 7.4 05/06/2022    HGB 12.8 11/30/2022    HGB 11.6 (L) 08/15/2022    HCT 35.4 08/15/2022    HCT 39.8 05/06/2022     11/30/2022     08/15/2022     BMP:   Lab Results   Component Value Date     07/23/2018    POTASSIUM 4.3 07/23/2018    CHLORIDE 107 07/23/2018    CO2 26 07/23/2018    BUN 14 07/23/2018    CR 0.82 07/23/2018     (H) 09/22/2022     (H) 09/21/2022     COAGS:   Lab Results   Component Value Date    PTT 60 (H) 09/20/2022    INR 1.01 09/20/2022     POC:   Lab Results   Component Value Date    HCG Negative 05/30/2014     HEPATIC:   Lab Results   Component Value Date    ALBUMIN 3.3 (L) 07/23/2018    PROTTOTAL 7.0 07/23/2018    ALT 19 07/23/2018    AST 15 07/23/2018    ALKPHOS 63 07/23/2018    BILITOTAL 0.5 07/23/2018     OTHER:   Lab Results   Component Value Date    LACT 1.3 09/20/2022    KATY 8.9 07/23/2018    TSH 1.83 07/23/2018    SED 6 04/27/2005       Anesthesia Plan    ASA Status:  2   NPO Status:  ELEVATED Aspiration Risk/Unknown    Anesthesia Type: Epidural.              Consents    Anesthesia Plan(s) and associated risks, benefits, and realistic alternatives discussed. Questions answered and patient/representative(s) expressed understanding.    - Discussed:     - Discussed with:  Patient         Postoperative Care            Comments:    Other Comments: - Severe spinal stenosis i/s/o lumbar disc herniation s/p right hemilaminectomy and microdiscectomy at lumbar 4-5 w/ Dr. Murray on 9/20/22  - COVID in pregnancy       H&P reviewed: Unable to attach VIRTUAL H&P to encounter due to EHR limitations. Appropriate H&P  reviewed. The physical exam performed by anesthesia during this surgical encounter serves as the physical portion of that virtual H&P.  Any significant changes noted within this preop evaluation.     neg OB ROS.         Kimberly Tamez MD

## 2022-12-01 VITALS
RESPIRATION RATE: 16 BRPM | SYSTOLIC BLOOD PRESSURE: 121 MMHG | TEMPERATURE: 97.8 F | DIASTOLIC BLOOD PRESSURE: 76 MMHG | OXYGEN SATURATION: 98 % | HEART RATE: 87 BPM

## 2022-12-01 LAB — HGB BLD-MCNC: 11.6 G/DL (ref 11.7–15.7)

## 2022-12-01 PROCEDURE — 85018 HEMOGLOBIN: CPT

## 2022-12-01 PROCEDURE — 36415 COLL VENOUS BLD VENIPUNCTURE: CPT

## 2022-12-01 PROCEDURE — 250N000013 HC RX MED GY IP 250 OP 250 PS 637

## 2022-12-01 RX ORDER — IBUPROFEN 600 MG/1
600 TABLET, FILM COATED ORAL EVERY 6 HOURS PRN
Qty: 60 TABLET | Refills: 0 | Status: SHIPPED | OUTPATIENT
Start: 2022-12-01 | End: 2023-01-12

## 2022-12-01 RX ORDER — ACETAMINOPHEN 325 MG/1
650 TABLET ORAL EVERY 6 HOURS PRN
Qty: 100 TABLET | Refills: 0 | Status: SHIPPED | OUTPATIENT
Start: 2022-12-01 | End: 2023-01-12

## 2022-12-01 RX ORDER — AMOXICILLIN 250 MG
1 CAPSULE ORAL DAILY
Qty: 100 TABLET | Refills: 0 | Status: SHIPPED | OUTPATIENT
Start: 2022-12-01 | End: 2023-01-12

## 2022-12-01 RX ADMIN — DOCUSATE SODIUM 100 MG: 100 CAPSULE, LIQUID FILLED ORAL at 09:55

## 2022-12-01 RX ADMIN — IBUPROFEN 800 MG: 800 TABLET ORAL at 09:55

## 2022-12-01 RX ADMIN — ACETAMINOPHEN 650 MG: 325 TABLET, FILM COATED ORAL at 09:55

## 2022-12-01 RX ADMIN — IBUPROFEN 800 MG: 800 TABLET ORAL at 16:40

## 2022-12-01 RX ADMIN — ACETAMINOPHEN 650 MG: 325 TABLET, FILM COATED ORAL at 05:38

## 2022-12-01 RX ADMIN — ACETAMINOPHEN 650 MG: 325 TABLET, FILM COATED ORAL at 15:09

## 2022-12-01 RX ADMIN — IBUPROFEN 800 MG: 800 TABLET ORAL at 02:13

## 2022-12-01 RX ADMIN — SERTRALINE HYDROCHLORIDE 50 MG: 25 TABLET ORAL at 09:54

## 2022-12-01 ASSESSMENT — ACTIVITIES OF DAILY LIVING (ADL)
ADLS_ACUITY_SCORE: 19

## 2022-12-01 NOTE — CONSULTS
Provided supportive check in at bedside to assess history of PMAD.  Indigo reports she had PPD after the birth of her two year old.  She started taking medication a year ago and plans to continue medication management for symptoms.  She is not currently in therapy but is open to this resource if needed.  She has no current concerns for mood and understands when to contact her PCP for symptom management.    Maylin ELIZALDEW, MSW, [facility]  Maternal Child Health

## 2022-12-01 NOTE — PLAN OF CARE
"Goal Outcome Evaluation:      Plan of Care Reviewed With: patient    Overall Patient Progress: improvingOverall Patient Progress: improving         VSS and postpartum assessments WDL.  Up ad horacio with steady gait and independent with cares.  Bonding well with infant.  Breastfeeding on cue independently, pt also supplementing with donor milk as pt is concerned that she is not producing enough breastmilk.  Pain managed with Tylenol and Ibuprofen.  PIV in L-hand SL.  Mello present and supportive.  Will continue with postpartum cares and education per plan of care.     Problem: Plan of Care - These are the overarching goals to be used throughout the patient stay.    Goal: Plan of Care Review  Description: The Plan of Care Review/Shift note should be completed every shift.  The Outcome Evaluation is a brief statement about your assessment that the patient is improving, declining, or no change.  This information will be displayed automatically on your shift note.  Outcome: Progressing  Flowsheets (Taken 12/1/2022 0527)  Plan of Care Reviewed With: patient  Overall Patient Progress: improving  Goal: Patient-Specific Goal (Individualized)  Description: You can add care plan individualizations to a care plan. Examples of Individualization might be:  \"Parent requests to be called daily at 9am for status\", \"I have a hard time hearing out of my right ear\", or \"Do not touch me to wake me up as it startles me\".  Outcome: Progressing  Goal: Absence of Hospital-Acquired Illness or Injury  Outcome: Progressing  Intervention: Prevent Skin Injury  Recent Flowsheet Documentation  Taken 12/1/2022 0213 by Marissa Duong, RN  Body Position: position changed independently  Goal: Optimal Comfort and Wellbeing  Outcome: Progressing  Intervention: Monitor Pain and Promote Comfort  Recent Flowsheet Documentation  Taken 12/1/2022 0213 by Marissa Duong, RN  Pain Management Interventions: medication (see MAR)  Intervention: Provide " Person-Centered Care  Recent Flowsheet Documentation  Taken 12/1/2022 0213 by Marissa Duong, RN  Trust Relationship/Rapport:   care explained   questions answered   questions encouraged  Goal: Readiness for Transition of Care  Outcome: Progressing     Problem: Postpartum (Vaginal Delivery)  Goal: Successful Maternal Role Transition  Outcome: Progressing  Goal: Hemostasis  Outcome: Progressing  Goal: Absence of Infection Signs and Symptoms  Outcome: Progressing  Goal: Anesthesia/Sedation Recovery  Outcome: Progressing  Goal: Optimal Pain Control and Function  Outcome: Progressing  Intervention: Prevent or Manage Pain  Recent Flowsheet Documentation  Taken 12/1/2022 0213 by Marissa Duong, RN  Pain Management Interventions: medication (see MAR)  Goal: Effective Urinary Elimination  Outcome: Progressing

## 2022-12-01 NOTE — LACTATION NOTE
This note was copied from a baby's chart.  Consult for: History of low milk supply    Delivery Information: Baby Ml was born at 39.6 weeks via vaginal delivery on 22 at 1011.    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 She has a history of low milk supply with her first son. She shares her milk did not come in until day 11 and she predominantly pumped and bottle fed her milk due to latch challenges. She had concerns about a tongue tie.  Infant had elevated bilirubin as well.  Indigo is recovering from spine surgery in September. She is able to position herself comfortably for breastfeeding. ?    Maternal Breast Exam: Indigo noted breast growth and sensitivity in early pregnancy. She denies any history of breast/chest injury or surgery. She has been able to hand express colostrum. ?  ?  Infant information: Baby Ml has age appropriate output. She has been waking regularly to breastfeeding. ?    Weight Change Since Birth: -4.9%    Oral exam of baby: Ml has a higher arched palate. She has a palpable lingual frenulum but appears to have good tongue movement and function.     Feeding Assessment: Indigo was able to latch Ml in the football position and was able to achieve a comfortable latch. Ml appears to have a deep, asymmetric latch and was heard swallowing.  Indigo has been hand expressing colostrum and is pumping due to her history of delayed milk supply.      Education: early feeding cues, benefits of feeding on cue, breastfeeding positions, signs breastfeeding is going well (comfortable latch, age appropriate output and weight loss, swallowing heard at the breast), satiety cues, expected  output,  weight loss, nutritive vs non-nutritive sucking, benefits of skin to skin, the Second Night, benefits of breast massage and hand expression of colostrum,  inpatient lactation support and outpatient lactation resources.      Plan: Continue breastfeeding on cue with RN  support as needed with a goal of 8-12 feedings per day. Encourage frequent skin to skin, breast massage and hand expression.     Encouraged pumping 4-6 times per day due to history of milk not coming in until 11 days postpartum. She plans to follow up with outpatient LC at [facility] after discharge. Indigo has a Spectra pump to use at home.

## 2022-12-01 NOTE — PLAN OF CARE
Goal Outcome Evaluation:  Pt stable. Pain well controlled with PRN tylenol and ibuprofen. Breastfeeding independently. Reviewed AVS. Declined home medications. Discharged to home with spouse and baby.

## 2022-12-01 NOTE — PLAN OF CARE
[id number]:  Pt stable and independent in room.  Bonding well with infant.  Breastfeeding on cue independently with good latch.  Pain managed with tylenol and ibuprofen per MAR.  EDS=0.  , Mello present and supportive.  Will continue with postpartum cares and education per plan of care.  Hoping to discharge home this evening after infant 24 hour labs result.       Plan of Care Reviewed With: patient    Overall Patient Progress: improvingOverall Patient Progress: improving

## 2022-12-01 NOTE — PLAN OF CARE
Goal Outcome Evaluation:  VSS. PP assessment WDL. Pain is well controlled with ibuprofen and tylenol. Pt is voiding spontaneously and reports passing gas. Nipples were a little dry so lanolin was given for pt to apply in between breastfeed sessions. She plans to breastfeed and is pumping to stimulate milk supply.  is at bedside and is supportive.     Continue with plan of care.

## 2022-12-01 NOTE — PLAN OF CARE
Goal Outcome Evaluation: [id number]      Plan of Care Reviewed With: patient      Data: VSS, postpartum assessments WNL. She is voiding without difficulty, up ad horacio, bowel sounds faint but present, eating and drinking normally. Perineum appears to be healing well, lochia WNL, no s/s infection. Breastfeeding infant with minimal assistance. Taking Tylenol and Ibuprofen for pain and using medicated pads. Reports good pain management.   Action: Education provided on plan of care and discharge tasks.  Response: Pt is agreeable with her plan of care. Positive attachment behaviors observed with infant. Support person, spouse Mello, present. Anticipate discharge per care plan.

## 2022-12-01 NOTE — PROGRESS NOTES
"Post Partum Progress Note  PPD#1    Subjective:  She states that she is resting comfortably in bed this morning. She complains of feeling very tired. Pain is improving and well controlled on current medication regimen. She is tolerating PO intake. Lochia present and minimal.  She is voiding without difficulty. She has passed flatus and has not had a BM. She is ambulating without dizziness or difficulty.  She denies headache, changes in vision, nausea/vomiting, chest pain, shortness of breath, RUQ pain, or worsening edema.  Plans to breast feed. Desires discharge to home today.    Objective:  Vital signs:  Temp: 97.8  F (36.6  C) Temp src: Oral BP: 132/85 Pulse: 76   Resp: 17 SpO2: 98 % O2 Device: None (Room air)        Estimated body mass index is 34.28 kg/m  as calculated from the following:    Height as of 22: 1.651 m (5' 5\").    Weight as of 22: 93.4 kg (206 lb).    General: NAD, resting comfortably  CV: Regular rate, well perfused.   Pulm: Normal respiratory effort.  Abd: Soft, non-tender, non-distended. Fundus is firm and 2 cm below the umbilicus.    Ext: 1+ lower extremity edema bilaterally. No calf tenderness.    Assessment/Plan:  Indigo Lamb is a 32 year old  female who is PPD#1 s/p . Doing well postpartum    # Postpartum:  - Encourage routine post-partum goals including ambulation   - PNC: Rh neg. Rubella immune. Rhogam eval pending  - Pain: controlled on oral medications  - Heme: Hgb 12.8> >AM Hgb pending.  If <10 will discharge home with iron. No signs or symptoms of acute blood loss anemia at this time.  - GI: continue anti-emetics and stool softeners as needed.  - : Rice removed. Voiding spontaneously.  - Infant: Stable in room  - Feeding: Plans on breast feeding.  - BC: Plans on condoms. Previously has used POPs but would like to avoid at this time    Anticipate discharge to home on PPD#1    Hair Casey MD  OBGYN PGY-3  2022 7:09 AM    I have seen " and examined the patient without the resident. I have reviewed, edited, and agree with the note.   My findings are:patient hopes to leave today after baby cleared for discharge.   Carrie Thompson MD

## 2022-12-02 ENCOUNTER — PATIENT OUTREACH (OUTPATIENT)
Dept: FAMILY MEDICINE | Facility: CLINIC | Age: 32
End: 2022-12-02

## 2022-12-02 NOTE — TELEPHONE ENCOUNTER
This ED admission was for a vaginal birth.  Will be followed by OBGYN.  This follow up encounter is closed.    Beni CONWAY [facility]

## 2022-12-02 NOTE — TELEPHONE ENCOUNTER
What type of discharge? Inpatient  Risk of Hospital admission or ED visit: 12%  Is a TCM episode required? No  When should the patient follow up with PCP? within 30 days of discharge.    Beni Ardon RN  [facility]

## 2022-12-13 ENCOUNTER — PRENATAL OFFICE VISIT (OUTPATIENT)
Dept: OBGYN | Facility: CLINIC | Age: 32
End: 2022-12-13
Payer: COMMERCIAL

## 2022-12-13 VITALS
HEART RATE: 75 BPM | HEIGHT: 65 IN | WEIGHT: 187.2 LBS | BODY MASS INDEX: 31.19 KG/M2 | TEMPERATURE: 97.3 F | SYSTOLIC BLOOD PRESSURE: 123 MMHG | RESPIRATION RATE: 16 BRPM | DIASTOLIC BLOOD PRESSURE: 74 MMHG

## 2022-12-13 PROCEDURE — 99212 OFFICE O/P EST SF 10 MIN: CPT | Performed by: OBSTETRICS & GYNECOLOGY

## 2022-12-13 ASSESSMENT — ANXIETY QUESTIONNAIRES
5. BEING SO RESTLESS THAT IT IS HARD TO SIT STILL: NOT AT ALL
2. NOT BEING ABLE TO STOP OR CONTROL WORRYING: NOT AT ALL
1. FEELING NERVOUS, ANXIOUS, OR ON EDGE: NOT AT ALL
3. WORRYING TOO MUCH ABOUT DIFFERENT THINGS: NOT AT ALL
GAD7 TOTAL SCORE: 0
GAD7 TOTAL SCORE: 0
IF YOU CHECKED OFF ANY PROBLEMS ON THIS QUESTIONNAIRE, HOW DIFFICULT HAVE THESE PROBLEMS MADE IT FOR YOU TO DO YOUR WORK, TAKE CARE OF THINGS AT HOME, OR GET ALONG WITH OTHER PEOPLE: NOT DIFFICULT AT ALL
7. FEELING AFRAID AS IF SOMETHING AWFUL MIGHT HAPPEN: NOT AT ALL
6. BECOMING EASILY ANNOYED OR IRRITABLE: NOT AT ALL

## 2022-12-13 ASSESSMENT — PATIENT HEALTH QUESTIONNAIRE - PHQ9
SUM OF ALL RESPONSES TO PHQ QUESTIONS 1-9: 2
5. POOR APPETITE OR OVEREATING: NOT AT ALL

## 2022-12-13 NOTE — PROGRESS NOTES
"SUBJECTIVE:  Indigo Lamb is a 32 year old female  here for a postpartum visit.  She had a  on 2022 delivering a healthy baby girl weighing 7 lbs 0 oz at term.      delivery complications:  No  breast feeding:  Yes,   bladder problems:  No  bowel problems/hemorrhoids:  No  episiotomy/laceration/incision healed? No  vaginal flow:  None  Lyons Switch:  No  contraception:  IUD  emotional adjustment:  doing well and happy  back to work:      OBJECTIVE:  Blood pressure 123/74, pulse 75, temperature 97.3  F (36.3  C), resp. rate 16, height 1.651 m (5' 5\"), weight 84.9 kg (187 lb 3.2 oz), last menstrual period 2022, currently breastfeeding.   General - pleasant female in no acute distress.  No exam today    ASSESSMENT:  normal postpartum exam    PLAN:  Discussed kegel exercises   May resume normal activities without restrictions  Pap smear was not  done today    The patient will use IUD for birth control. Full counseling was provided, and all questions answered. Compliance is strongly emphasized.  Return to clinic in 4 weeks      Nasim Rodriguez MD    "

## 2022-12-22 ENCOUNTER — HOSPITAL ENCOUNTER (OUTPATIENT)
Dept: PHYSICAL THERAPY | Facility: CLINIC | Age: 32
Setting detail: THERAPIES SERIES
Discharge: HOME | End: 2022-12-22
Payer: COMMERCIAL

## 2022-12-22 PROCEDURE — 97112 NEUROMUSCULAR REEDUCATION: CPT | Mod: GP | Performed by: PHYSICAL THERAPIST

## 2022-12-30 ENCOUNTER — MEDICAL CORRESPONDENCE (OUTPATIENT)
Dept: HEALTH INFORMATION MANAGEMENT | Facility: CLINIC | Age: 32
End: 2022-12-30

## 2023-01-12 ENCOUNTER — PRENATAL OFFICE VISIT (OUTPATIENT)
Dept: OBGYN | Facility: CLINIC | Age: 33
End: 2023-01-12
Payer: COMMERCIAL

## 2023-01-12 VITALS
SYSTOLIC BLOOD PRESSURE: 116 MMHG | HEART RATE: 71 BPM | HEIGHT: 65 IN | TEMPERATURE: 98.5 F | RESPIRATION RATE: 14 BRPM | WEIGHT: 180.2 LBS | BODY MASS INDEX: 30.02 KG/M2 | DIASTOLIC BLOOD PRESSURE: 76 MMHG

## 2023-01-12 PROBLEM — Z37.9 NORMAL LABOR: Status: RESOLVED | Noted: 2022-11-30 | Resolved: 2023-01-12

## 2023-01-12 PROBLEM — Z34.80 PRENATAL CARE, SUBSEQUENT PREGNANCY: Status: RESOLVED | Noted: 2022-04-05 | Resolved: 2023-01-12

## 2023-01-12 ASSESSMENT — PATIENT HEALTH QUESTIONNAIRE - PHQ9
SUM OF ALL RESPONSES TO PHQ QUESTIONS 1-9: 1
5. POOR APPETITE OR OVEREATING: NOT AT ALL

## 2023-01-12 ASSESSMENT — ANXIETY QUESTIONNAIRES
GAD7 TOTAL SCORE: 0
IF YOU CHECKED OFF ANY PROBLEMS ON THIS QUESTIONNAIRE, HOW DIFFICULT HAVE THESE PROBLEMS MADE IT FOR YOU TO DO YOUR WORK, TAKE CARE OF THINGS AT HOME, OR GET ALONG WITH OTHER PEOPLE: NOT DIFFICULT AT ALL
3. WORRYING TOO MUCH ABOUT DIFFERENT THINGS: NOT AT ALL
5. BEING SO RESTLESS THAT IT IS HARD TO SIT STILL: NOT AT ALL
GAD7 TOTAL SCORE: 0
6. BECOMING EASILY ANNOYED OR IRRITABLE: NOT AT ALL
1. FEELING NERVOUS, ANXIOUS, OR ON EDGE: NOT AT ALL
7. FEELING AFRAID AS IF SOMETHING AWFUL MIGHT HAPPEN: NOT AT ALL
2. NOT BEING ABLE TO STOP OR CONTROL WORRYING: NOT AT ALL

## 2023-01-12 NOTE — PROGRESS NOTES
"[facility]    Post Partum Office Visit    CC: Post partum visit    HPI: Indigo Lamb is a 32 year old  who presents for a 6 week post-partum visit.  Patient delivered on 22, at [facility] at 39w6d gestation.  Patient delivered via , with second degree perineal and right labial lacerations.  Complications During Labor: None    Patient doing well since delivery. Lochia subsided 2 weeks ago. Breastfeeding, going well.      Information  Sex: female  Complications: none  Feeding Method: breast milk    Maternal Information  Postpartum Depression: denies   Resumed Datil: no  Last Pap Smear: 2021 NIL  Birth Control Method:condoms    ROS:  General/Constitutional:  Denies chills or fever  Respiratory: Denies shortness of breath, cough, wheezing   Cardiovascular: Denies chest pain, exertional pain, irregular heartbeat  Gastrointestinal:  Denies abdominal pain, constipation, nausea, or vomiting  Genitourinary: Denies hematuria, difficulty urinating, frequency, or dysuria   Skin: Denies dry skin, itching, rash, new skin lesions  Musculoskeletal: Denies aching muscles or joints, swelling in joints, back pain, shoulder pain, or painful feet, no peripheral edema  Psychiatric: Denies post partum depression    Physical Exam:   Vitals:    23 1533   BP: 116/76   BP Location: Left arm   Patient Position: Sitting   Cuff Size: Adult Regular   Pulse: 71   Resp: 14   Temp: 98.5  F (36.9  C)   TempSrc: Tympanic   Weight: 81.7 kg (180 lb 3.2 oz)   Height: 1.651 m (5' 5\")      Estimated body mass index is 29.99 kg/m  as calculated from the following:    Height as of this encounter: 1.651 m (5' 5\").    Weight as of this encounter: 81.7 kg (180 lb 3.2 oz).    General appearance: well-hydrated, A&O x 3, no apparent distress  Lungs: Equal expansion bilaterally, no accessory muscle use  Heart: No heaves or thrills. No peripheral varicosities  Abdomen: Soft, non-tender, " non-distended. No rebound, rigidity, or guarding.  Extremities: no edema  Neuro: CN II-XII grossly intact  Genitourinary:  External genitalia: no erythema, no lesions. Right labia minora healed from laceration, some minor disruption in architecture   Anus and Perineum: Unremarkable, no visible lesions  Vagina: Normal, healthy pink mucosa without any lesions. Physiologic vaginal discharge.   Cervix: normal appearance, no cervical motion tenderness.   Uterus: involuted, normal size, shape and consistency.   Adnexa: no masses or tenderness bilaterally.        Assessment and Plan:     Encounter Diagnosis   Name Primary?     Routine postpartum follow-up Yes       Appropriate post partum recovery.    Plan for condoms for contraception.     Plan for routine GYN cares, PAP smear due 2024.     Jessica Thacker,

## 2023-01-12 NOTE — NURSING NOTE
"Initial /76 (BP Location: Left arm, Patient Position: Sitting, Cuff Size: Adult Regular)   Pulse 71   Temp 98.5  F (36.9  C) (Tympanic)   Resp 14   Ht 1.651 m (5' 5\")   Wt 81.7 kg (180 lb 3.2 oz)   LMP 02/24/2022   Breastfeeding Yes   BMI 29.99 kg/m   Estimated body mass index is 29.99 kg/m  as calculated from the following:    Height as of this encounter: 1.651 m (5' 5\").    Weight as of this encounter: 81.7 kg (180 lb 3.2 oz). .      "

## 2023-03-28 ENCOUNTER — MEDICAL CORRESPONDENCE (OUTPATIENT)
Dept: HEALTH INFORMATION MANAGEMENT | Facility: CLINIC | Age: 33
End: 2023-03-28
Payer: COMMERCIAL

## 2023-04-08 ENCOUNTER — E-VISIT (OUTPATIENT)
Dept: URGENT CARE | Facility: CLINIC | Age: 33
End: 2023-04-08
Payer: COMMERCIAL

## 2023-04-08 DIAGNOSIS — R07.0 THROAT PAIN: Primary | ICD-10-CM

## 2023-04-08 DIAGNOSIS — Z20.818 STREPTOCOCCUS EXPOSURE: ICD-10-CM

## 2023-04-08 PROCEDURE — 99421 OL DIG E/M SVC 5-10 MIN: CPT | Performed by: PHYSICIAN ASSISTANT

## 2023-04-08 RX ORDER — CEPHALEXIN 500 MG/1
500 CAPSULE ORAL 2 TIMES DAILY
Qty: 20 CAPSULE | Refills: 0 | Status: SHIPPED | OUTPATIENT
Start: 2023-04-08 | End: 2023-04-18

## 2023-04-08 NOTE — PATIENT INSTRUCTIONS
Indigo,   I am going to treat you for presumed strep throat due to your symptoms & household exposure to strep throat. A prescription for Keflex was sent. Feel better soon!    1) Increase rest and fluid intake.  2) Take Tylenol 650mg every 4 hours or ibuprofen 600mg every 6 hours by mouth for pain/fever.  Do not exceed 4000mg of acetaminophen or 2400mg of ibuprofen from any source in a 24 hour period.  Taking Tylenol and ibuprofen together may be helpful in reducing pain.   3) Strep infection is considered contagious until treated for 24 hours; avoid attending school or work during contagious period.  4) Complete full course of antibiotics.   5) Replace toothbrush after being on the antibiotic for 48 hours to avoid reinfection   6) Return if not resolved in one week or sooner if worsening.    Strep Throat  Strep throat is a throat infection caused by a bacteria called group A Streptococcus bacteria (group A strep). The bacteria live in the nose and throat. Strep throat is contagious and spreads easily from person to person through airborne droplets when an infected person coughs, sneezes, or talks. Good hand washing is important to help prevent the spread of this illness.  Children diagnosed with strep throat should not attend school or  until they have been taking antibiotics and had no fever for 24 hours.  Strep throat mainly affects school-aged children between 5 and 15 years of age, but can affect adults too. When it isn't treated, it can lead to serious problems including rheumatic fever (an inflammation of the joints and heart) and kidney damage.    How is strep throat spread?  Strep throat can be easily spread from an infected person's saliva by:    Drinking and eating after them    Sharing a straw, cup, toothbrushes, and eating utensils  When to go to the emergency room (ER)  Call 911 if you have trouble breathing or swallowing, or signs of dehydration (no urination in 8 hours, or dark urine, dry  mouth).  When to call your healthcare provider  Call your healthcare provider if you have finished the treatment for strep throat and have:    Joint pain or swelling    Ear pain or pressure    Headaches    Rash    Fever      Easing strep throat symptoms  These tips can help ease your symptoms:    Use Tylenol or ibuprofen.     Eat easy-to-swallow foods, such as soup, applesauce, popsicles, cold drinks, milk shakes, and yogurt. Avoid spicy or acidic foods.    Use a cool-mist humidifier in your bedroom.    Gargle with saltwater. Mix 1/4 teaspoon salt in 1 cup (8 oz) of warm water.   Date Last Reviewed: 1/1/2017 2000-2019 [employer]. [address] [place] [address], [place] [address]. All rights reserved. This information is not intended as a substitute for professional medical care. Always follow your healthcare professional's instructions.

## 2023-04-09 ENCOUNTER — HEALTH MAINTENANCE LETTER (OUTPATIENT)
Age: 33
End: 2023-04-09

## 2023-05-30 ENCOUNTER — MEDICAL CORRESPONDENCE (OUTPATIENT)
Dept: HEALTH INFORMATION MANAGEMENT | Facility: CLINIC | Age: 33
End: 2023-05-30
Payer: COMMERCIAL

## 2023-10-11 ASSESSMENT — PATIENT HEALTH QUESTIONNAIRE - PHQ9
SUM OF ALL RESPONSES TO PHQ QUESTIONS 1-9: 0
SUM OF ALL RESPONSES TO PHQ QUESTIONS 1-9: 0
10. IF YOU CHECKED OFF ANY PROBLEMS, HOW DIFFICULT HAVE THESE PROBLEMS MADE IT FOR YOU TO DO YOUR WORK, TAKE CARE OF THINGS AT HOME, OR GET ALONG WITH OTHER PEOPLE: NOT DIFFICULT AT ALL

## 2023-10-12 ENCOUNTER — VIRTUAL VISIT (OUTPATIENT)
Dept: OBGYN | Facility: CLINIC | Age: 33
End: 2023-10-12
Payer: COMMERCIAL

## 2023-10-12 DIAGNOSIS — Z30.019 ENCOUNTER FOR FEMALE BIRTH CONTROL: Primary | ICD-10-CM

## 2023-10-12 PROCEDURE — 99213 OFFICE O/P EST LOW 20 MIN: CPT | Mod: 93 | Performed by: PHYSICIAN ASSISTANT

## 2023-10-12 RX ORDER — NORGESTIMATE AND ETHINYL ESTRADIOL 0.25-0.035
1 KIT ORAL DAILY
Qty: 114 TABLET | Refills: 3 | Status: SHIPPED | OUTPATIENT
Start: 2023-10-12

## 2023-10-12 NOTE — PROGRESS NOTES
Indigo is a 33 year old who is being evaluated via a billable telephone visit.      What phone number would you like to be contacted at? [phone]  How would you like to obtain your AVS? Latrice    Distant Location (provider location):  On-site    Patient at Home  She was informed that I am in my office and the patient's privacy and HIPAA is still in affect with this telephone encounter.       Subjective   Indigo is a 33 year old, presenting for the following health issues:  Consult (Birth control)      HPI   Patient states she is 10 months postpartum and would like to get back on birth control. She has researched all of her options and would like to continue with oral birth control because it has worked well for her in the past. She states after having kids her periods have become heavier and with more cramping, so she is hoping getting back on birth control will help with these symptoms.     LMP: 2 weeks ago  Breast feeding: not breast feeding.   Birth control currently: condoms   Birth control in the past: sprintec which worked well. She would like to get back on this medication.     She does not smoke, no migraines with aura, no personal or family history of blood clots, no HTN or liver disease, or cardiac disease.       Review of Systems   All other systems reviewed and are negative.           Objective       Vitals:  No vitals were obtained today due to virtual visit.    Physical Exam   healthy, alert, and no distress  PSYCH: Alert and oriented times 3; coherent speech, normal   rate and volume, able to articulate logical thoughts, able   to abstract reason, no tangential thoughts, no hallucinations   or delusions  Her affect is normal  RESP: No cough, no audible wheezing, able to talk in full sentences  Remainder of exam unable to be completed due to telephone visits      DIAGNOSIS:     (Z30.019) Encounter for female birth control  (primary encounter diagnosis)  Comment: patient would like to get back on  oral birth control and has had good results with sprintec in the past. Patient without contraindications for OCP.   Plan: norgestimate-ethinyl estradiol (ORTHO-CYCLEN)         0.25-35 MG-MCG tablet        Side effects, risks, and benefits discussed today. Patient will plan to start her blood pressure after her next menses.   Return to clinic in 1 year for annual exam and birth control refill.     Phone call duration: 11 minutes    Start time: 9:14 am  End time: 9:25 am     Answers submitted by the patient for this visit:  Patient Health Questionnaire (Submitted on 10/11/2023)  If you checked off any problems, how difficult have these problems made it for you to do your work, take care of things at home, or get along with other people?: Not difficult at all  PHQ9 TOTAL SCORE: 0

## 2023-10-16 NOTE — PROGRESS NOTES
DISCHARGE  Reason for Discharge: Patient chooses to discontinue therapy.    Equipment Issued: none    Discharge Plan: Patient to continue home program.    Referring Provider:  David Bay     12/22/22 1500   Appointment Info   Signing clinician's name / credentials Mello Kapoor PT [facility]   Visits Used 4   Subjective Report   Subjective Report had her baby naturally a couple weeks ago.  not really any pain.  still has a lot of weakness.   Objective Measure 1   Objective Measure hip abd, ER, HS 4-/5, hip ext 4/5, knee ext 4+/5, hip flex 4+/5, hip IR 4/5, gastroc 2/5, dorsiflexion 4+/5, great toe 3+/5,   Objective Measure 2   Objective Measure gait, mod limp, WBOS, no push off.   Treatment Interventions (PT)   Interventions Neuromuscular Re-education   Neuromuscular Re-education   Neuromuscular re-ed of mvmt, balance, coord, kinesthetic sense, posture, proprioception minutes (07776) 25   Skilled Intervention stability and strength   Patient Response/Progress feels fatigue   Treatment Detail Exercise Name: Ball Exercise Supine Bridging - Reps: 15-25 - Sessions: 1, Notes: can cross arms  Exercise Name: Ball Exercise Supine Straight Leg Raise  Exercise Name: Bridging On Ball With Hamstring Curls (Large Ball)  Exercise Name: Supine Ball Walkout  Exercise Name: Prone Ball Walkout  Exercise Name: Ball Stabilization Prone Alternating Arm and Leg Extension - Peds  Exercise Name: Ball Exercise Prone Push Up  Exercise Name: Side Plank Modified Knees - Sessions: 1, Notes: one minute total  Exercise Name: Ball Exercise Seated Marching - Peds, supine isometric oblique, heel raises AAROM.   Plan   Plan for next session progress in a couple weeks   Ortho Goal 1   Goal Identifier STG   Goal Description Pt will have decreased lateral sway & hip ROT in gait w/ or w/o SPC in order to prevent further pelvic instability associated w/ pain or weakness.   Goal Progress Pt has visibly little/no lateral sway w/ amb w/o AD in clinic today,  hip/pelvic ROT visible but not impacting LBP per pt   Target Date 11/07/22   Date Met 11/02/22   Ortho Goal 2   Goal Identifier LTG   Goal Description Pt will have R great toe MMT 5/5 in order to improve gait pattern.   Goal Progress Continuing today to work on R LE (including R great toe) strengthening & coordination   Target Date 11/28/22   Ortho Goal 3   Goal Identifier LTG   Goal Description Pt will have glut med MMT 4+/5 or greater in order to improve pelvic stability during pregnancy & postpartum.   Goal Progress Progressed HEP today to include standing hip ABD (that didn't increase LBP or pelvic pressure on stance leg)   Target Date 11/28/22   Ortho Goal 4   Goal Identifier STG   Goal Description Pt will be independent in HEP in order to continue strengthening & stretching at home.

## 2023-11-30 NOTE — TELEPHONE ENCOUNTER
Please see message below. Neuro ref pended.    Thank you  Alva ROBERTS RN     acetaminophen 325 mg oral tablet: 2 tab(s) orally every 6 hours  benzonatate 100 mg oral capsule: 1 cap(s) orally 3 times a day as needed for  cough  busPIRone 30 mg oral tablet: 1 tab(s) orally 3 times a day  diazePAM 5 mg oral tablet: 1 tab(s) orally every 6 hours, As Needed -Anxiety - for anxiety MDD:4 tabs daily  lamoTRIgine 150 mg oral tablet: 1 tab(s) orally once a day  Reglan 10 mg oral tablet: 1 tab(s) orally once a day as needed for headache  sertraline 100 mg oral tablet: 1 tab(s) orally once a day

## 2024-02-06 ENCOUNTER — PATIENT OUTREACH (OUTPATIENT)
Dept: OBGYN | Facility: CLINIC | Age: 34
End: 2024-02-06
Payer: COMMERCIAL

## 2024-02-06 NOTE — LETTER
February 6, 2024      Indigo Lamb  [address] [facility] [address]              Dear Indigo,    To ensure we are providing the best quality care, we have reviewed your chart and see that you are due for:    Cervical Cancer Screening:    Please call Dept: [phone] to schedule an Annual Exam with Pap Smear.    If you have completed the tests outside of [facility], please have the results forwarded to our office Fax # [phone]. We will update the chart for your primary Physician to review before your next annual physical.    Thank you for trusting us with your health care.        Sincerely,      Jessica Thacker DO

## 2024-02-06 NOTE — TELEPHONE ENCOUNTER
Panel Management Review        Health Maintenance List    Health Maintenance   Topic Date Due    ADVANCE CARE PLANNING  Never done    YEARLY PREVENTIVE VISIT  03/06/2021    INFLUENZA VACCINE (1) 09/01/2023    COVID-19 Vaccine (3 - 2023-24 season) 09/01/2023    PAP FOLLOW-UP  01/15/2024    PHQ-9  04/12/2024    DTAP/TDAP/TD IMMUNIZATION (5 - Td or Tdap) 09/07/2032    HEPATITIS C SCREENING  Completed    HIV SCREENING  Completed    DEPRESSION ACTION PLAN  Completed    HPV IMMUNIZATION  Completed    HEPATITIS B IMMUNIZATION  Completed    Pneumococcal Vaccine: Pediatrics (0 to 5 Years) and At-Risk Patients (6 to 64 Years)  Aged Out    IPV IMMUNIZATION  Aged Out    MENINGITIS IMMUNIZATION  Aged Out    RSV MONOCLONAL ANTIBODY  Aged Out    PAP  Discontinued       Composite cancer screening  Chart review shows that this patient is due/due soon for the following Pap Smear  Lab Results   Component Value Date    PAP NIL 01/15/2021     Past Surgical History:   Procedure Laterality Date    HC TOOTH EXTRACTION W/FORCEP      wisdom teeth    HEMILAMINECTOMY, DISCECTOMY LUMBAR ONE LEVEL, COMBINED Right 9/20/2022    Procedure: RIGHT HEMILAMINECTOMY,  WITH MICRODISCECTOMY L4-5;  Surgeon: Jenn Murray MD;  Location: [facility] OR    SURGICAL HISTORY OF -   2008    Bone-patellar-bone left knee anterior cruciate ligament reconstruction       Is hysterectomy listed in surgical history? No   Is mastectomy listed in surgical history? No     Summary:    Patient is due/failing the following:   Pap Smear    Action needed: Patient needs office visit for Annual Exam with pap smear.    Type of outreach:  Sent [email or web address] message.      Staff Signature:  Amy Almonte CMA

## 2024-04-29 ENCOUNTER — HOSPITAL ENCOUNTER (EMERGENCY)
Facility: CLINIC | Age: 34
Discharge: HOME | End: 2024-04-29
Payer: COMMERCIAL

## 2024-04-29 VITALS
OXYGEN SATURATION: 98 % | SYSTOLIC BLOOD PRESSURE: 111 MMHG | TEMPERATURE: 98.6 F | HEART RATE: 93 BPM | RESPIRATION RATE: 16 BRPM | DIASTOLIC BLOOD PRESSURE: 62 MMHG

## 2024-04-29 DIAGNOSIS — B34.9 VIRAL ILLNESS: ICD-10-CM

## 2024-04-29 LAB
FLUAV RNA SPEC QL NAA+PROBE: NEGATIVE
FLUBV RNA RESP QL NAA+PROBE: NEGATIVE
GROUP A STREP BY PCR: NOT DETECTED
RSV RNA SPEC NAA+PROBE: NEGATIVE
SARS-COV-2 RNA RESP QL NAA+PROBE: NEGATIVE

## 2024-04-29 PROCEDURE — 99213 OFFICE O/P EST LOW 20 MIN: CPT

## 2024-04-29 PROCEDURE — G0463 HOSPITAL OUTPT CLINIC VISIT: HCPCS

## 2024-04-29 PROCEDURE — 87637 SARSCOV2&INF A&B&RSV AMP PRB: CPT

## 2024-04-29 PROCEDURE — 87651 STREP A DNA AMP PROBE: CPT

## 2024-04-29 ASSESSMENT — COLUMBIA-SUICIDE SEVERITY RATING SCALE - C-SSRS
6. HAVE YOU EVER DONE ANYTHING, STARTED TO DO ANYTHING, OR PREPARED TO DO ANYTHING TO END YOUR LIFE?: NO
1. IN THE PAST MONTH, HAVE YOU WISHED YOU WERE DEAD OR WISHED YOU COULD GO TO SLEEP AND NOT WAKE UP?: NO
2. HAVE YOU ACTUALLY HAD ANY THOUGHTS OF KILLING YOURSELF IN THE PAST MONTH?: NO

## 2024-04-29 ASSESSMENT — ACTIVITIES OF DAILY LIVING (ADL): ADLS_ACUITY_SCORE: 35

## 2024-04-29 NOTE — ED PROVIDER NOTES
History     Chief Complaint   Patient presents with    Pharyngitis     HPI  Indigo Lamb is a 33 year old female who presents to urgent care with chief complaint of sore throat.  Acute onset of sore throat developed few days ago along with fever.  Patient has been treating fevers with ibuprofen and Tylenol.  She denies rashes, otalgia, abdominal discomfort, nausea, vomiting, dysphagia.  She does allergy to penicillins and azithromycin.    Allergies:  Allergies   Allergen Reactions    Pcn [Penicillins] Hives and Rash     Blistering on face, all over    Zithromax [Azithromycin Dihydrate] Rash       Problem List:    Patient Active Problem List    Diagnosis Date Noted    Lumbar disc herniation 09/19/2022     Priority: Medium    Rh negative state in antepartum period 11/25/2020     Priority: Medium    Vestibular dysfunction, unspecified laterality 08/28/2019     Priority: Medium     Normal MRI.  Saw [facility] 8/9/19 and referred to ENT      Family history of nephrolithiasis 06/02/2014     Priority: Medium    Dysmenorrhea 10/19/2012     Priority: Medium     Better with oral contraceptive pills      Recurrent UTI 10/19/2012     Priority: Medium    CARDIOVASCULAR SCREENING; LDL GOAL LESS THAN 160 05/29/2012     Priority: Medium        Past Medical History:    Past Medical History:   Diagnosis Date    Lumbar disc herniation     Post partum depression 2020       Past Surgical History:    Past Surgical History:   Procedure Laterality Date    HC TOOTH EXTRACTION W/FORCEP      wisdom teeth    HEMILAMINECTOMY, DISCECTOMY LUMBAR ONE LEVEL, COMBINED Right 9/20/2022    Procedure: RIGHT HEMILAMINECTOMY,  WITH MICRODISCECTOMY L4-5;  Surgeon: Jenn Murray MD;  Location: [facility] OR    SURGICAL HISTORY OF -   2008    Bone-patellar-bone left knee anterior cruciate ligament reconstruction       Family History:    Family History   Problem Relation Age of Onset    Anxiety Disorder Mother     Obesity Father     Allergies  Brother     Hypertension Maternal Grandmother     Breast Cancer Maternal Grandmother     Heart Failure Maternal Grandfather     Alcoholism Maternal Grandfather     Other - See Comments Paternal Grandmother         father adopted    C.A.D. No family hx of     Diabetes No family hx of     Cerebrovascular Disease No family hx of     Cancer - colorectal No family hx of        Social History:  Marital Status:   [2]  Social History     Tobacco Use    Smoking status: Never    Smokeless tobacco: Never    Tobacco comments:     parents smoke   Vaping Use    Vaping status: Never Used   Substance Use Topics    Alcohol use: Not Currently     Comment: rare-quit with pregnancy    Drug use: No        Medications:    norgestimate-ethinyl estradiol (ORTHO-CYCLEN) 0.25-35 MG-MCG tablet          Review of Systems  Pertinent ROS in HPI, all other systems reviewed and are negative.  Physical Exam   BP: 111/62  Pulse: 93  Temp: 98.6  F (37  C)  Resp: 16  SpO2: 98 %      Physical Exam  Vitals and nursing note reviewed.   Constitutional:       General: She is not in acute distress.     Appearance: She is not ill-appearing, toxic-appearing or diaphoretic.   HENT:      Head: Normocephalic.      Right Ear: Tympanic membrane and ear canal normal.      Left Ear: Tympanic membrane and ear canal normal.      Nose: No congestion or rhinorrhea.      Mouth/Throat:      Mouth: Mucous membranes are moist.      Tonsils: Tonsillar exudate present. No tonsillar abscesses. 2+ on the right. 2+ on the left.   Cardiovascular:      Rate and Rhythm: Normal rate and regular rhythm.   Pulmonary:      Effort: Pulmonary effort is normal.      Breath sounds: Normal breath sounds.   Musculoskeletal:      Cervical back: Normal range of motion.   Lymphadenopathy:      Cervical: Cervical adenopathy present.   Skin:     General: Skin is warm.      Findings: No rash.   Neurological:      General: No focal deficit present.      Mental Status: She is alert.    Psychiatric:         Mood and Affect: Mood normal.         Behavior: Behavior normal.         ED Course          Results for orders placed or performed during the hospital encounter of 04/29/24 (from the past 24 hour(s))   Group A Streptococcus PCR Throat Swab    Specimen: Throat; Swab   Result Value Ref Range    Group A strep by PCR Not Detected Not Detected    Narrative    The Xpert Xpress Strep A test, performed on the [employer]  Instrument Systems, is a rapid, qualitative in vitro diagnostic test for the detection of Streptococcus pyogenes (Group A ß-hemolytic Streptococcus, Strep A) in throat swab specimens from patients with signs and symptoms of pharyngitis. The Xpert Xpress Strep A test can be used as an aid in the diagnosis of Group A Streptococcal pharyngitis. The assay is not intended to monitor treatment for Group A Streptococcus infections. The Xpert Xpress Strep A test utilizes an automated real-time polymerase chain reaction (PCR) to detect Streptococcus pyogenes DNA.   Symptomatic Influenza A/B, RSV, & SARS-CoV2 PCR (COVID-19) Nose    Specimen: Nose; Swab   Result Value Ref Range    Influenza A PCR Negative Negative    Influenza B PCR Negative Negative    RSV PCR Negative Negative    SARS CoV2 PCR Negative Negative    Narrative    Testing was performed using the Xpert Xpress CoV2/Flu/RSV Assay on the [employer] Instrument. This test should be ordered for the detection of SARS-CoV-2, influenza, and RSV viruses in individuals who meet clinical and/or epidemiological criteria. Test performance is unknown in asymptomatic patients. This test is for in vitro diagnostic use under the FDA EUA for laboratories certified under CLIA to perform high or moderate complexity testing. This test has not been FDA cleared or approved. A negative result does not rule out the presence of PCR inhibitors in the specimen or target RNA in concentration below the limit of detection for the assay. If only one viral  target is positive but coinfection with multiple targets is suspected, the sample should be re-tested with another FDA cleared, approved, or authorized test, if coinfection would change clinical management. This test was validated by the [facility]. These laboratories are certified under the Clinical Laboratory Improvement Amendments of 1988 (CLIA-88) as qualified to perform high complexity laboratory testing.       Medications - No data to display    Assessments & Plan (with Medical Decision Making)     I have reviewed the nursing notes.    I have reviewed the findings, diagnosis, plan and need for follow up with the patient.    Medical Decision Making    Patient is a 33-year-old female presenting to urgent care with concerns for strep throat.  She reports 2 days of sore throat and fever.She denies rashes, otalgia, abdominal discomfort, nausea, vomiting, dysphagia.  She does allergy to penicillins and azithromycin.    Exam as above.  Patient in no acute distress and afebrile.  Exam significant for tonsillar exudates with bilateral tonsils 2+.    Viral swab and group A strep swabs obtained.  Labs individually reviewed revealing negative group A strep, COVID-19, influenza, RSV.    Patient should consider mono testing if symptoms do not improve in the next 3 to 5 days.    Recommended increase fluid intake, rest, ibuprofen and Tylenol for fevers.  Over-the-counter cold and flu medications of nasal congestion develops.  Trial salt water throat rinses.    Patient is agreeable to plan and all questions were answered.  Patient discharged home.        Discharge Medication List as of 4/29/2024 12:28 PM          Final diagnoses:   Viral illness       4/29/2024   [facility] EMERGENCY DEPT       Naty Elias PA-C  04/29/24 [id number]

## 2024-05-08 ENCOUNTER — TELEPHONE (OUTPATIENT)
Dept: FAMILY MEDICINE | Facility: CLINIC | Age: 34
End: 2024-05-08
Payer: COMMERCIAL

## 2024-05-08 NOTE — TELEPHONE ENCOUNTER
Patient Quality Outreach    Patient is due for the following:   Cervical Cancer Screening - PAP Needed  Physical Preventive Adult Physical    Next Steps:   Schedule a Adult Preventative    Type of outreach:    Sent [employer] message.      Questions for provider review:    None           Tonia Bowens

## 2024-06-16 ENCOUNTER — HEALTH MAINTENANCE LETTER (OUTPATIENT)
Age: 34
End: 2024-06-16

## 2024-09-04 ENCOUNTER — TELEPHONE (OUTPATIENT)
Dept: FAMILY MEDICINE | Facility: CLINIC | Age: 34
End: 2024-09-04
Payer: COMMERCIAL

## 2024-09-04 NOTE — TELEPHONE ENCOUNTER
Patient Quality Outreach    Patient is due for the following:   Cervical Cancer Screening - PAP Needed  Physical Preventive Adult Physical      Topic Date Due    Flu Vaccine (1) 09/01/2024    COVID-19 Vaccine (3 - 2023-24 season) 09/01/2024       Next Steps:   Schedule a Adult Preventative, pap smear & immunizations    Type of outreach:    Sent [employer] message.      Questions for provider review:    None         Nicci Carter CMA

## 2025-05-09 ENCOUNTER — OFFICE VISIT (OUTPATIENT)
Dept: FAMILY MEDICINE | Facility: CLINIC | Age: 35
End: 2025-05-09
Payer: COMMERCIAL

## 2025-05-09 ENCOUNTER — RESULTS FOLLOW-UP (OUTPATIENT)
Dept: FAMILY MEDICINE | Facility: CLINIC | Age: 35
End: 2025-05-09

## 2025-05-09 VITALS
RESPIRATION RATE: 16 BRPM | SYSTOLIC BLOOD PRESSURE: 110 MMHG | BODY MASS INDEX: 27.42 KG/M2 | DIASTOLIC BLOOD PRESSURE: 70 MMHG | HEART RATE: 65 BPM | HEIGHT: 65 IN | WEIGHT: 164.6 LBS | OXYGEN SATURATION: 98 %

## 2025-05-09 DIAGNOSIS — Z00.00 ROUTINE GENERAL MEDICAL EXAMINATION AT A HEALTH CARE FACILITY: ICD-10-CM

## 2025-05-09 DIAGNOSIS — R87.610 ASCUS OF CERVIX WITH NEGATIVE HIGH RISK HPV: Primary | ICD-10-CM

## 2025-05-09 DIAGNOSIS — Z13.0 SCREENING FOR DEFICIENCY ANEMIA: ICD-10-CM

## 2025-05-09 DIAGNOSIS — G89.29 CHRONIC BILATERAL LOW BACK PAIN WITHOUT SCIATICA: ICD-10-CM

## 2025-05-09 DIAGNOSIS — Z13.220 LIPID SCREENING: ICD-10-CM

## 2025-05-09 DIAGNOSIS — Z12.4 CERVICAL CANCER SCREENING: Primary | ICD-10-CM

## 2025-05-09 DIAGNOSIS — M51.26 LUMBAR DISC HERNIATION: ICD-10-CM

## 2025-05-09 DIAGNOSIS — M54.50 CHRONIC BILATERAL LOW BACK PAIN WITHOUT SCIATICA: ICD-10-CM

## 2025-05-09 DIAGNOSIS — K59.00 CONSTIPATION, UNSPECIFIED CONSTIPATION TYPE: ICD-10-CM

## 2025-05-09 LAB
CHOLEST SERPL-MCNC: 184 MG/DL
ERYTHROCYTE [DISTWIDTH] IN BLOOD BY AUTOMATED COUNT: 13.1 % (ref 10–15)
FASTING STATUS PATIENT QL REPORTED: NO
HCT VFR BLD AUTO: 40.8 % (ref 35–47)
HDLC SERPL-MCNC: 55 MG/DL
HGB BLD-MCNC: 13.5 G/DL (ref 11.7–15.7)
LDLC SERPL CALC-MCNC: 114 MG/DL
MCH RBC QN AUTO: 29.7 PG (ref 26.5–33)
MCHC RBC AUTO-ENTMCNC: 33.1 G/DL (ref 31.5–36.5)
MCV RBC AUTO: 90 FL (ref 78–100)
NONHDLC SERPL-MCNC: 129 MG/DL
PLATELET # BLD AUTO: 187 10E3/UL (ref 150–450)
RBC # BLD AUTO: 4.55 10E6/UL (ref 3.8–5.2)
TRIGL SERPL-MCNC: 77 MG/DL
TSH SERPL DL<=0.005 MIU/L-ACNC: 1.67 UIU/ML (ref 0.3–4.2)
WBC # BLD AUTO: 6.4 10E3/UL (ref 4–11)

## 2025-05-09 PROCEDURE — 3074F SYST BP LT 130 MM HG: CPT | Performed by: FAMILY MEDICINE

## 2025-05-09 PROCEDURE — 99213 OFFICE O/P EST LOW 20 MIN: CPT | Mod: 25 | Performed by: FAMILY MEDICINE

## 2025-05-09 PROCEDURE — 36415 COLL VENOUS BLD VENIPUNCTURE: CPT | Performed by: FAMILY MEDICINE

## 2025-05-09 PROCEDURE — G0145 SCR C/V CYTO,THINLAYER,RESCR: HCPCS | Performed by: FAMILY MEDICINE

## 2025-05-09 PROCEDURE — 85027 COMPLETE CBC AUTOMATED: CPT | Performed by: FAMILY MEDICINE

## 2025-05-09 PROCEDURE — 1126F AMNT PAIN NOTED NONE PRSNT: CPT | Performed by: FAMILY MEDICINE

## 2025-05-09 PROCEDURE — 84443 ASSAY THYROID STIM HORMONE: CPT | Performed by: FAMILY MEDICINE

## 2025-05-09 PROCEDURE — 87624 HPV HI-RISK TYP POOLED RSLT: CPT | Performed by: FAMILY MEDICINE

## 2025-05-09 PROCEDURE — 80061 LIPID PANEL: CPT | Performed by: FAMILY MEDICINE

## 2025-05-09 PROCEDURE — 3078F DIAST BP <80 MM HG: CPT | Performed by: FAMILY MEDICINE

## 2025-05-09 PROCEDURE — 99395 PREV VISIT EST AGE 18-39: CPT | Performed by: FAMILY MEDICINE

## 2025-05-09 SDOH — HEALTH STABILITY: PHYSICAL HEALTH: ON AVERAGE, HOW MANY MINUTES DO YOU ENGAGE IN EXERCISE AT THIS LEVEL?: 90 MIN

## 2025-05-09 SDOH — HEALTH STABILITY: PHYSICAL HEALTH: ON AVERAGE, HOW MANY DAYS PER WEEK DO YOU ENGAGE IN MODERATE TO STRENUOUS EXERCISE (LIKE A BRISK WALK)?: 5 DAYS

## 2025-05-09 ASSESSMENT — PAIN SCALES - GENERAL: PAINLEVEL_OUTOF10: NO PAIN (0)

## 2025-05-09 ASSESSMENT — SOCIAL DETERMINANTS OF HEALTH (SDOH): HOW OFTEN DO YOU GET TOGETHER WITH FRIENDS OR RELATIVES?: ONCE A WEEK

## 2025-05-09 NOTE — PROGRESS NOTES
"Preventive Care Visit  [facility]  Florence Lopez DO, Family Medicine  May 9, 2025      Assessment & Plan     Routine general medical examination at a health care facility    Cervical cancer screening  - HPV and Gynecologic Cytology Panel - Recommended Age 30 - 65 Years  - Gynecologic Cytology (PAP)    Lumbar disc herniation  Ongoing pain and some referred pain into but, plan to update MRI and see spine  - Spine  Referral  - MR Lumbar Spine w/o & w Contrast    Chronic bilateral low back pain without sciatica  - Spine  Referral  - MR Lumbar Spine w/o & w Contrast    Lipid screening  - Lipid panel reflex to direct LDL Fasting  - Lipid panel reflex to direct LDL Fasting    Constipation, unspecified constipation type  - TSH with free T4 reflex  - TSH with free T4 reflex    Screening for deficiency anemia  - CBC with platelets  - CBC with platelets      Patient has been advised of split billing requirements and indicates understanding: Yes        BMI  Estimated body mass index is 27.39 kg/m  as calculated from the following:    Height as of this encounter: 1.651 m (5' 5\").    Weight as of this encounter: 74.7 kg (164 lb 9.6 oz).       Counseling  Appropriate preventive services were addressed with this patient via screening, questionnaire, or discussion as appropriate for fall prevention, nutrition, physical activity, Tobacco-use cessation, social engagement, weight loss and cognition.  Checklist reviewing preventive services available has been given to the patient.  Reviewed patient's diet, addressing concerns and/or questions.           Yamil Sood is a 35 year old, presenting for the following:  Physical        5/9/2025     1:29 PM   Additional Questions   Roomed by Delmi CHRISTENSEN MA   Accompanied by Self        HPI       Advance Care Planning  Discussed advance care planning with patient; however, patient declined at this time.        5/9/2025   General Health   How " would you rate your overall physical health? Good   Feel stress (tense, anxious, or unable to sleep) Only a little   (!) STRESS CONCERN      5/9/2025   Nutrition   Three or more servings of calcium each day? Yes   Diet: Regular (no restrictions)   How many servings of fruit and vegetables per day? (!) 2-3   How many sweetened beverages each day? 0-1         5/9/2025   Exercise   Days per week of moderate/strenous exercise 5 days   Average minutes spent exercising at this level 90 min         5/9/2025   Social Factors   Frequency of gathering with friends or relatives Once a week   Worry food won't last until get money to buy more No   Food not last or not have enough money for food? No   Do you have housing? (Housing is defined as stable permanent housing and does not include staying outside in a car, in a tent, in an abandoned building, in an overnight shelter, or couch-surfing.) Yes   Are you worried about losing your housing? No   Lack of transportation? No   Unable to get utilities (heat,electricity)? No         5/9/2025   Dental   Dentist two times every year? Yes       Today's PHQ-2 Score:       5/9/2025     1:18 PM   PHQ-2 ( 1999 [employer])   Q1: Little interest or pleasure in doing things 0   Q2: Feeling down, depressed or hopeless 0   PHQ-2 Score 0    Q1: Little interest or pleasure in doing things Not at all   Q2: Feeling down, depressed or hopeless Not at all   PHQ-2 Score 0       Patient-reported           5/9/2025   Substance Use   Alcohol more than 3/day or more than 7/wk No   Do you use any other substances recreationally? No     Social History     Tobacco Use    Smoking status: Never     Passive exposure: Never    Smokeless tobacco: Never    Tobacco comments:     parents smoke   Vaping Use    Vaping status: Never Used   Substance Use Topics    Alcohol use: Yes     Comment: rare-quit with pregnancy    Drug use: No          Mammogram Screening - Patient under 40 years of age: Routine Mammogram Screening  "not recommended.         5/9/2025   STI Screening   New sexual partner(s) since last STI/HIV test? No     History of abnormal Pap smear: No - age 30- 64 PAP with HPV every 5 years recommended        Latest Ref Rng & Units 5/9/2025     2:16 PM 1/15/2021     3:11 PM 3/22/2018     2:06 PM   PAP / HPV   PAP (Historical)   NIL  NIL    HPV 16 DNA Negative Negative      HPV 18 DNA Negative Negative      Other HR HPV Negative Negative              5/9/2025   Contraception/Family Planning   Questions about contraception or family planning No     Reviewed and updated as needed this visit by Provider   Tobacco       Fam Hx            Past Medical History:   Diagnosis Date    Lumbar disc herniation     Post partum depression 2020     Past Surgical History:   Procedure Laterality Date    HC TOOTH EXTRACTION W/FORCEP      wisdom teeth    HEMILAMINECTOMY, DISCECTOMY LUMBAR ONE LEVEL, COMBINED Right 09/20/2022    Procedure: RIGHT HEMILAMINECTOMY,  WITH MICRODISCECTOMY L4-5;  Surgeon: Jenn Murray MD;  Location: [facility] OR    ORTHOPEDIC SURGERY  2008    SURGICAL HISTORY OF -   2008    Bone-patellar-bone left knee anterior cruciate ligament reconstruction     Review of Systems  Constitutional, HEENT, cardiovascular, pulmonary, gi and gu systems are negative, except as otherwise noted.     Objective    Exam  /70 (BP Location: Right arm, Patient Position: Chair, Cuff Size: Adult Regular)   Pulse 65   Resp 16   Ht 1.651 m (5' 5\")   Wt 74.7 kg (164 lb 9.6 oz)   LMP 04/27/2025   SpO2 98%   Breastfeeding No   BMI 27.39 kg/m     Estimated body mass index is 27.39 kg/m  as calculated from the following:    Height as of this encounter: 1.651 m (5' 5\").    Weight as of this encounter: 74.7 kg (164 lb 9.6 oz).    Physical Exam  GENERAL: alert and no distress  EYES: Eyes grossly normal to inspection, PERRL and conjunctivae and sclerae normal  HENT: ear canals and TM's normal, nose and mouth without ulcers or lesions  NECK: no " adenopathy, no asymmetry, masses, or scars  RESP: lungs clear to auscultation - no rales, rhonchi or wheezes  CV: regular rate and rhythm, normal S1 S2, no S3 or S4, no murmur, click or rub, no peripheral edema  ABDOMEN: soft, nontender, no hepatosplenomegaly, no masses and bowel sounds normal  MS: no gross musculoskeletal defects noted, no edema  SKIN: no suspicious lesions or rashes  NEURO: Normal strength and tone, mentation intact and speech normal  PSYCH: mentation appears normal, affect normal/bright        Signed Electronically by: Florence Lopez DO

## 2025-05-09 NOTE — PATIENT INSTRUCTIONS
Fiber supplement  Water  Yoga?  Consider pelvic floor PT    Spine and MRI     Patient Education   Preventive Care Advice   This is general advice given by our system to help you stay healthy. However, your care team may have specific advice just for you. Please talk to your care team about your preventive care needs.  Nutrition  Eat 5 or more servings of fruits and vegetables each day.  Try wheat bread, brown rice and whole grain pasta (instead of white bread, rice, and pasta).  Get enough calcium and vitamin D. Check the label on foods and aim for 100% of the RDA (recommended daily allowance).  Lifestyle  Exercise at least 150 minutes each week  (30 minutes a day, 5 days a week).  Do muscle strengthening activities 2 days a week. These help control your weight and prevent disease.  No smoking.  Wear sunscreen to prevent skin cancer.  Have a dental exam and cleaning every 6 months.  Yearly exams  See your health care team every year to talk about:  Any changes in your health.  Any medicines your care team has prescribed.  Preventive care, family planning, and ways to prevent chronic diseases.  Shots (vaccines)   HPV shots (up to age 26), if you've never had them before.  Hepatitis B shots (up to age 59), if you've never had them before.  COVID-19 shot: Get this shot when it's due.  Flu shot: Get a flu shot every year.  Tetanus shot: Get a tetanus shot every 10 years.  Pneumococcal, hepatitis A, and RSV shots: Ask your care team if you need these based on your risk.  Shingles shot (for age 50 and up)  General health tests  Diabetes screening:  Starting at age 35, Get screened for diabetes at least every 3 years.  If you are younger than age 35, ask your care team if you should be screened for diabetes.  Cholesterol test: At age 39, start having a cholesterol test every 5 years, or more often if advised.  Bone density scan (DEXA): At age 50, ask your care team if you should have this scan for osteoporosis (brittle  bones).  Hepatitis C: Get tested at least once in your life.  STIs (sexually transmitted infections)  Before age 24: Ask your care team if you should be screened for STIs.  After age 24: Get screened for STIs if you're at risk. You are at risk for STIs (including HIV) if:  You are sexually active with more than one person.  You don't use condoms every time.  You or a partner was diagnosed with a sexually transmitted infection.  If you are at risk for HIV, ask about PrEP medicine to prevent HIV.  Get tested for HIV at least once in your life, whether you are at risk for HIV or not.  Cancer screening tests  Cervical cancer screening: If you have a cervix, begin getting regular cervical cancer screening tests starting at age 21.  Breast cancer scan (mammogram): If you've ever had breasts, begin having regular mammograms starting at age 40. This is a scan to check for breast cancer.  Colon cancer screening: It is important to start screening for colon cancer at age 45.  Have a colonoscopy test every 10 years (or more often if you're at risk) Or, ask your provider about stool tests like a FIT test every year or Cologuard test every 3 years.  To learn more about your testing options, visit:   .  For help making a decision, visit:   https://bit.ly/zx99192.  Prostate cancer screening test: If you have a prostate, ask your care team if a prostate cancer screening test (PSA) at age 55 is right for you.  Lung cancer screening: If you are a current or former smoker ages 50 to 80, ask your care team if ongoing lung cancer screenings are right for you.  For informational purposes only. Not to replace the advice of your health care provider. Copyright   2023 [employer]. All rights reserved. Clinically reviewed by the [facility] Transitions Program. [place] [id number] - REV 01/24.

## 2025-05-12 LAB
HPV HR 12 DNA CVX QL NAA+PROBE: NEGATIVE
HPV16 DNA CVX QL NAA+PROBE: NEGATIVE
HPV18 DNA CVX QL NAA+PROBE: NEGATIVE
HUMAN PAPILLOMA VIRUS FINAL DIAGNOSIS: NORMAL

## 2025-05-15 LAB
BKR AP ASSOCIATED HPV REPORT: NORMAL
BKR LAB AP GYN ADEQUACY: NORMAL
BKR LAB AP GYN INTERPRETATION: NORMAL
BKR LAB AP PREVIOUS ABNORMAL: NORMAL
PATH REPORT.COMMENTS IMP SPEC: NORMAL
PATH REPORT.COMMENTS IMP SPEC: NORMAL
PATH REPORT.RELEVANT HX SPEC: NORMAL

## (undated) DEVICE — SOL WATER IRRIG 1000ML BOTTLE 2F7114

## (undated) DEVICE — DRAPE C-ARM W/STRAPS 42X72" 07-CA104

## (undated) DEVICE — DRAPE MICROSCOPE MICRO-KOVER LEICA 48"X120" 09-MK651

## (undated) DEVICE — SUCTION MANIFOLD NEPTUNE 2 SYS 1 PORT 702-025-000

## (undated) DEVICE — GLOVE PROTEXIS MICRO 7.0  2D73PM70

## (undated) DEVICE — DRSG PRIMAPORE 03 1/8X6" 66000318

## (undated) DEVICE — ADH SKIN CLOSURE PREMIERPRO EXOFIN 1.0ML 3470

## (undated) DEVICE — TOOL DISSECT MIDAS MR8 14CM MATCH HEAD 3MM MR8-14MH30

## (undated) DEVICE — POSITIONER ARMBOARD FOAM 1PAIR LF FP-ARMB1

## (undated) DEVICE — Device

## (undated) DEVICE — LINEN BACK PACK 5440

## (undated) DEVICE — SU MONOCRYL 3-0 PS-2 18" UND Y497G

## (undated) DEVICE — GLOVE PROTEXIS BLUE W/NEU-THERA 7.0  2D73EB70

## (undated) DEVICE — SOL NACL 0.9% IRRIG 1000ML BOTTLE 2F7124

## (undated) DEVICE — SU VICRYL 0 CT-1 CR 8X18" J740D

## (undated) DEVICE — LINEN GOWN X4 5410

## (undated) DEVICE — SU VICRYL 2-0 CT-2 8X18" UND D8144

## (undated) RX ORDER — CEFAZOLIN SODIUM 1 G/3ML
INJECTION, POWDER, FOR SOLUTION INTRAMUSCULAR; INTRAVENOUS
Status: DISPENSED
Start: 2022-09-20

## (undated) RX ORDER — ACETAMINOPHEN 325 MG/1
TABLET ORAL
Status: DISPENSED
Start: 2022-09-20

## (undated) RX ORDER — ONDANSETRON 2 MG/ML
INJECTION INTRAMUSCULAR; INTRAVENOUS
Status: DISPENSED
Start: 2022-09-20

## (undated) RX ORDER — BUPIVACAINE HYDROCHLORIDE AND EPINEPHRINE 2.5; 5 MG/ML; UG/ML
INJECTION, SOLUTION EPIDURAL; INFILTRATION; INTRACAUDAL; PERINEURAL
Status: DISPENSED
Start: 2022-09-20

## (undated) RX ORDER — DEXAMETHASONE SODIUM PHOSPHATE 4 MG/ML
INJECTION, SOLUTION INTRA-ARTICULAR; INTRALESIONAL; INTRAMUSCULAR; INTRAVENOUS; SOFT TISSUE
Status: DISPENSED
Start: 2022-09-20

## (undated) RX ORDER — SODIUM CHLORIDE, SODIUM LACTATE, POTASSIUM CHLORIDE, CALCIUM CHLORIDE 600; 310; 30; 20 MG/100ML; MG/100ML; MG/100ML; MG/100ML
INJECTION, SOLUTION INTRAVENOUS
Status: DISPENSED
Start: 2022-09-20

## (undated) RX ORDER — FENTANYL CITRATE-0.9 % NACL/PF 10 MCG/ML
PLASTIC BAG, INJECTION (ML) INTRAVENOUS
Status: DISPENSED
Start: 2022-09-20

## (undated) RX ORDER — OXYCODONE HYDROCHLORIDE 5 MG/1
TABLET ORAL
Status: DISPENSED
Start: 2022-09-20

## (undated) RX ORDER — GLYCOPYRROLATE 0.2 MG/ML
INJECTION INTRAMUSCULAR; INTRAVENOUS
Status: DISPENSED
Start: 2022-09-20

## (undated) RX ORDER — CITRIC ACID/SODIUM CITRATE 334-500MG
SOLUTION, ORAL ORAL
Status: DISPENSED
Start: 2022-09-20

## (undated) RX ORDER — PROPOFOL 10 MG/ML
INJECTION, EMULSION INTRAVENOUS
Status: DISPENSED
Start: 2022-09-20

## (undated) RX ORDER — FENTANYL CITRATE 50 UG/ML
INJECTION, SOLUTION INTRAMUSCULAR; INTRAVENOUS
Status: DISPENSED
Start: 2022-09-20

## (undated) RX ORDER — NEOSTIGMINE METHYLSULFATE 1 MG/ML
VIAL (ML) INJECTION
Status: DISPENSED
Start: 2022-09-20

## (undated) RX ORDER — GLYCOPYRROLATE 0.2 MG/ML
INJECTION, SOLUTION INTRAMUSCULAR; INTRAVENOUS
Status: DISPENSED
Start: 2022-09-20

## (undated) RX ORDER — HYDROMORPHONE HYDROCHLORIDE 1 MG/ML
INJECTION, SOLUTION INTRAMUSCULAR; INTRAVENOUS; SUBCUTANEOUS
Status: DISPENSED
Start: 2022-09-20

## (undated) RX ORDER — CLINDAMYCIN PHOSPHATE 900 MG/50ML
INJECTION, SOLUTION INTRAVENOUS
Status: DISPENSED
Start: 2022-09-20